# Patient Record
Sex: FEMALE | Race: WHITE | NOT HISPANIC OR LATINO | Employment: FULL TIME | URBAN - METROPOLITAN AREA
[De-identification: names, ages, dates, MRNs, and addresses within clinical notes are randomized per-mention and may not be internally consistent; named-entity substitution may affect disease eponyms.]

---

## 2019-09-03 ENCOUNTER — HOSPITAL ENCOUNTER (INPATIENT)
Facility: HOSPITAL | Age: 74
LOS: 3 days | Discharge: HOME | DRG: 280 | End: 2019-09-07
Attending: EMERGENCY MEDICINE | Admitting: HOSPITALIST
Payer: COMMERCIAL

## 2019-09-03 DIAGNOSIS — R93.5 ABNORMAL CT OF THE ABDOMEN: ICD-10-CM

## 2019-09-03 DIAGNOSIS — I71.9 PENETRATING ATHEROSCLEROTIC ULCER OF AORTA: ICD-10-CM

## 2019-09-03 DIAGNOSIS — I21.3 ACUTE ST ELEVATION MYOCARDIAL INFARCTION (STEMI): Primary | ICD-10-CM

## 2019-09-03 DIAGNOSIS — I21.4 NON-ST ELEVATION MYOCARDIAL INFARCTION (NSTEMI): ICD-10-CM

## 2019-09-03 DIAGNOSIS — Z72.0 TOBACCO ABUSE: ICD-10-CM

## 2019-09-03 DIAGNOSIS — I21.3 STEMI (ST ELEVATION MYOCARDIAL INFARCTION): ICD-10-CM

## 2019-09-03 DIAGNOSIS — I21.3 ACUTE ST ELEVATION MYOCARDIAL INFARCTION (STEMI), UNSPECIFIED ARTERY: ICD-10-CM

## 2019-09-03 DIAGNOSIS — I21.4 NSTEMI (NON-ST ELEVATION MYOCARDIAL INFARCTION): ICD-10-CM

## 2019-09-03 DIAGNOSIS — R79.89 ELEVATED TROPONIN: ICD-10-CM

## 2019-09-03 DIAGNOSIS — R07.9 CHEST PAIN, UNSPECIFIED TYPE: ICD-10-CM

## 2019-09-03 LAB
ALBUMIN SERPL BCP-MCNC: 3.5 G/DL (ref 3.5–5.2)
ALP SERPL-CCNC: 96 U/L (ref 55–135)
ALT SERPL W/O P-5'-P-CCNC: 27 U/L (ref 10–44)
ANION GAP SERPL CALC-SCNC: 11 MMOL/L (ref 8–16)
APTT BLDCRRT: 24.3 SEC (ref 21–32)
APTT BLDCRRT: 28.7 SEC (ref 21–32)
AST SERPL-CCNC: 51 U/L (ref 10–40)
BASOPHILS # BLD AUTO: 0.03 K/UL (ref 0–0.2)
BASOPHILS NFR BLD: 0.3 % (ref 0–1.9)
BILIRUB SERPL-MCNC: 0.5 MG/DL (ref 0.1–1)
BUN SERPL-MCNC: 18 MG/DL (ref 8–23)
CALCIUM SERPL-MCNC: 9.7 MG/DL (ref 8.7–10.5)
CHLORIDE SERPL-SCNC: 104 MMOL/L (ref 95–110)
CK SERPL-CCNC: 245 U/L (ref 20–180)
CO2 SERPL-SCNC: 23 MMOL/L (ref 23–29)
CREAT SERPL-MCNC: 1 MG/DL (ref 0.5–1.4)
DIFFERENTIAL METHOD: NORMAL
EOSINOPHIL # BLD AUTO: 0.2 K/UL (ref 0–0.5)
EOSINOPHIL NFR BLD: 2.1 % (ref 0–8)
ERYTHROCYTE [DISTWIDTH] IN BLOOD BY AUTOMATED COUNT: 14.4 % (ref 11.5–14.5)
EST. GFR  (AFRICAN AMERICAN): >60 ML/MIN/1.73 M^2
EST. GFR  (NON AFRICAN AMERICAN): 56 ML/MIN/1.73 M^2
GLUCOSE SERPL-MCNC: 101 MG/DL (ref 70–110)
HCT VFR BLD AUTO: 40.5 % (ref 37–48.5)
HGB BLD-MCNC: 13.3 G/DL (ref 12–16)
INR PPP: 0.9 (ref 0.8–1.2)
INR PPP: 1 (ref 0.8–1.2)
LYMPHOCYTES # BLD AUTO: 3.6 K/UL (ref 1–4.8)
LYMPHOCYTES NFR BLD: 34.9 % (ref 18–48)
MCH RBC QN AUTO: 28.8 PG (ref 27–31)
MCHC RBC AUTO-ENTMCNC: 32.8 G/DL (ref 32–36)
MCV RBC AUTO: 88 FL (ref 82–98)
MONOCYTES # BLD AUTO: 0.9 K/UL (ref 0.3–1)
MONOCYTES NFR BLD: 8.5 % (ref 4–15)
NEUTROPHILS # BLD AUTO: 5.6 K/UL (ref 1.8–7.7)
NEUTROPHILS NFR BLD: 54.2 % (ref 38–73)
PLATELET # BLD AUTO: 271 K/UL (ref 150–350)
PMV BLD AUTO: 10.4 FL (ref 9.2–12.9)
POTASSIUM SERPL-SCNC: 3.6 MMOL/L (ref 3.5–5.1)
PROT SERPL-MCNC: 7.5 G/DL (ref 6–8.4)
PROTHROMBIN TIME: 10 SEC (ref 9–12.5)
PROTHROMBIN TIME: 10.2 SEC (ref 9–12.5)
RBC # BLD AUTO: 4.62 M/UL (ref 4–5.4)
SODIUM SERPL-SCNC: 138 MMOL/L (ref 136–145)
TROPONIN I SERPL DL<=0.01 NG/ML-MCNC: 27.51 NG/ML (ref 0–0.03)
TROPONIN I SERPL DL<=0.01 NG/ML-MCNC: 28.1 NG/ML (ref 0–0.03)
TSH SERPL DL<=0.005 MIU/L-ACNC: 1.43 UIU/ML (ref 0.4–4)
WBC # BLD AUTO: 10.31 K/UL (ref 3.9–12.7)

## 2019-09-03 PROCEDURE — 80053 COMPREHEN METABOLIC PANEL: CPT

## 2019-09-03 PROCEDURE — 99252 PR INITIAL INPATIENT CONSULT,LEVL II: ICD-10-PCS | Mod: ,,, | Performed by: INTERNAL MEDICINE

## 2019-09-03 PROCEDURE — 85610 PROTHROMBIN TIME: CPT | Mod: 91

## 2019-09-03 PROCEDURE — 82550 ASSAY OF CK (CPK): CPT

## 2019-09-03 PROCEDURE — 86038 ANTINUCLEAR ANTIBODIES: CPT

## 2019-09-03 PROCEDURE — 85730 THROMBOPLASTIN TIME PARTIAL: CPT | Mod: 91

## 2019-09-03 PROCEDURE — 84484 ASSAY OF TROPONIN QUANT: CPT | Mod: 91

## 2019-09-03 PROCEDURE — 85025 COMPLETE CBC W/AUTO DIFF WBC: CPT

## 2019-09-03 PROCEDURE — 25000003 PHARM REV CODE 250: Performed by: INTERNAL MEDICINE

## 2019-09-03 PROCEDURE — 85610 PROTHROMBIN TIME: CPT

## 2019-09-03 PROCEDURE — 99291 CRITICAL CARE FIRST HOUR: CPT | Mod: 25

## 2019-09-03 PROCEDURE — 25500020 PHARM REV CODE 255: Performed by: EMERGENCY MEDICINE

## 2019-09-03 PROCEDURE — 84443 ASSAY THYROID STIM HORMONE: CPT

## 2019-09-03 PROCEDURE — 80307 DRUG TEST PRSMV CHEM ANLYZR: CPT

## 2019-09-03 PROCEDURE — 96375 TX/PRO/DX INJ NEW DRUG ADDON: CPT

## 2019-09-03 PROCEDURE — 96374 THER/PROPH/DIAG INJ IV PUSH: CPT

## 2019-09-03 PROCEDURE — 85730 THROMBOPLASTIN TIME PARTIAL: CPT

## 2019-09-03 PROCEDURE — 99252 IP/OBS CONSLTJ NEW/EST SF 35: CPT | Mod: ,,, | Performed by: INTERNAL MEDICINE

## 2019-09-03 RX ORDER — HEPARIN SODIUM,PORCINE/D5W 25000/250
12 INTRAVENOUS SOLUTION INTRAVENOUS CONTINUOUS
Status: DISCONTINUED | OUTPATIENT
Start: 2019-09-04 | End: 2019-09-06

## 2019-09-03 RX ORDER — HEPARIN SODIUM,PORCINE/D5W 25000/250
12 INTRAVENOUS SOLUTION INTRAVENOUS CONTINUOUS
Status: DISCONTINUED | OUTPATIENT
Start: 2019-09-03 | End: 2019-09-03

## 2019-09-03 RX ADMIN — HEPARIN SODIUM AND DEXTROSE 12 UNITS/KG/HR: 10000; 5 INJECTION INTRAVENOUS at 11:09

## 2019-09-03 RX ADMIN — TICAGRELOR 180 MG: 90 TABLET ORAL at 10:09

## 2019-09-03 RX ADMIN — IOHEXOL 75 ML: 350 INJECTION, SOLUTION INTRAVENOUS at 06:09

## 2019-09-03 NOTE — ED PROVIDER NOTES
Encounter Date: 9/3/2019       History   No chief complaint on file.    74 y.o. female Past Medical History:  No date: Hypertension   Smoker,  Notes that she was in [facility] until 2 days ago notes that she ate something and shortly after had burning sensation in chest rad to back and across arm, vomited once and then took an ibuprofen and notes that all symptoms resolved within minutes, today went to see primary care as she was concerned that it could be her gallbladder.  While there she  Had an ekg which was concerning for STEMI.  notes that 2-3 times now she has had this post prandial discomfort. Notes that currently she does not have the burning sensation in her chest. States that she has a non pleuritic pinching sensation in L chest. Got 325mg asa and 2 SL nitro prior to arrival which have not affected her symptoms.        Review of patient's allergies indicates:   Allergen Reactions    Codeine     Demerol [meperidine]     Epinephrine      Past Medical History:   Diagnosis Date    Hypertension      Past Surgical History:   Procedure Laterality Date    HYSTERECTOMY      TONSILLECTOMY       No family history on file.  Social History     Tobacco Use    Smoking status: Current Every Day Smoker   Substance Use Topics    Alcohol use: No    Drug use: No     Review of Systems   Constitutional: Negative for fever.   HENT: Negative for sore throat.    Respiratory: Negative for shortness of breath.    Cardiovascular: Negative for chest pain.   Gastrointestinal: Negative for nausea.   Genitourinary: Negative for dysuria.   Musculoskeletal: Negative for back pain.   Skin: Negative for rash.   Neurological: Negative for weakness.   Hematological: Does not bruise/bleed easily.   All other systems reviewed and are negative.      Physical Exam     Initial Vitals   BP Pulse Resp Temp SpO2   -- -- -- -- --      MAP       --         Physical Exam    Nursing note and vitals reviewed.  Constitutional: She appears well-developed  "and well-nourished.   HENT:   Head: Normocephalic and atraumatic.   Eyes: Conjunctivae and EOM are normal. Pupils are equal, round, and reactive to light.   Neck: Normal range of motion.   Cardiovascular: Normal rate and regular rhythm.   Pulmonary/Chest: Breath sounds normal. No respiratory distress. She has no wheezes. She has no rales.   Abdominal: She exhibits no distension.   Musculoskeletal: Normal range of motion.   Neurological: She is alert. No cranial nerve deficit. GCS score is 15. GCS eye subscore is 4. GCS verbal subscore is 5. GCS motor subscore is 6.   Skin: Skin is warm and dry.   Psychiatric: She has a normal mood and affect. Thought content normal.     sitting up smiling, no distress, comfortable, chatting with providers, laughing dismissively at providers when we raise the possibility that she may be having an MI "i'm not having a heart attack", she is very excited about the "story she will have to tell".    ED Course   Procedures  Labs Reviewed - No data to display  EKG Readings: (Independently Interpreted)   I have reviewed all EKGs from pre-hospital and here from today.  15:46 from primary care: hr 88, nl axis/intervals, profound ARTI v3-6  16:37 from EMS hr 94, nl axis/intervals, ARTI v2-6  17:01 hr 101 nl axis/intervals, arti I, II, III, AVF, v2-6  17:09 hr 93, nl axis/intevals, ARTI II, III, AVF, v2-3 with std v6       Imaging Results    None          Medical Decision Making:   Initial Assessment:   This is an atypical presentation. Pt keeps saying she "never had pain" and "does not have pain currently" and "is not having a heart attack".     17:25 I have discussed with Dr. Jenkins from interventional cardiology. He will evaluate the patient.    17:40 Dr. Jenkins has evaluated the patient and reviewed the EKGs'. Unclear etiology of her dynamic changes ? Coronary spasm less likely dissection, she does have a recent viral illness ?pericarditis.  He does not believe that this constitutes a stemi but has " asked me to call him back if her trop are elevated.    18:15 I have called Dr. Jenkins to notify him that trop is elevated. He is in cath lab procedure with another pt and asks that I call Dr. Mederos  18:20 Dr. Mederos asks me to call Dr. Camejo who is on stemi call  18:22 I have spoken with Dr. Camejo who feels that the patient likely had a STEMI several days ago when she initially had pain and is not currently having a stemi and does not need emergent cath now. This is supported by the fact that the patient is absolutely pain free, smiling, well appearing.    19:14 I have spoken with Dr. Jenkins who agrees with discussion with vascular surgery prior to anticoagulating. He asks that we obtain consultation from Vascular surgery prior to further intervention in an effort to prevent harm.       19:25 I have d/w Dr. Helton from Vascular surgery who has reviewed her CT scan and will provide recs.      19:41 Dr. Helton feels that the patient likely needs cardiology intervention for cardiac event which likely happened days ago, however given that we do not have vascular surgery here that is able to consult on patient and approve anticoagulation he agrees with transfer to [facility] for coordinated treatment between vascular surgery and interventional cardiology.          Labs Reviewed   COMPREHENSIVE METABOLIC PANEL - Abnormal; Notable for the following components:       Result Value    AST 51 (*)     eGFR if non  56 (*)     All other components within normal limits   TROPONIN I - Abnormal; Notable for the following components:    Troponin I 28.103 (*)     All other components within normal limits   CK - Abnormal; Notable for the following components:     (*)     All other components within normal limits   CBC W/ AUTO DIFFERENTIAL   TSH   PROTIME-INR   APTT   DRUG SCREEN PANEL, URINE EMERGENCY   LULY PROFILE I (SCREEN)       CTA Chest Abdomen Pelvis   Final Result      Penetrating  atherosclerotic ulcer involving the suprarenal abdominal aorta.  Findings may account for acute chest pain.  Moderate atherosclerotic disease.      Mild left basilar atelectasis versus early infiltrate.      Fat containing right pelvic mass.  Findings are concerning for teratoma.         Electronically signed by: Carlyn Kennedy   Date:    09/03/2019   Time:    18:50                  Attending Attestation:         Attending Critical Care:   Critical Care Times:   Direct Patient Care (initial evaluation, reassessments, and time considering the case)................................................................60 minutes.   Additional History from reviewing old medical records or taking additional history from the family, EMS, PCP, etc.......................10 minutes.   Ordering, Reviewing, and Interpreting Diagnostic Studies...............................................................................................................10 minutes.   Documentation..................................................................................................................................................................................10 minutes.   Consultation with other Physicians. .................................................................................................................................................10 minutes.   ==============================================================  · Total Critical Care Time - exclusive of procedural time: 100 minutes.  ==============================================================                    Clinical Impression:       ICD-10-CM ICD-9-CM   1. Abnormal CT of the abdomen R93.5 793.6   2. Chest pain, unspecified type R07.9 786.50   3. Elevated troponin R74.8 790.6   4.      Penetrating Aortic Ulcer with Intramural Hematoma                             Angel Burton MD  09/03/19 2000

## 2019-09-03 NOTE — ED TRIAGE NOTES
Pt with c/o mid sternal chest pain radiating towards back associated with nausea and vomiting.Pt took antiinflammatory and pain went away. Pt reports pain on and off since Saturday. Pt seen at PCP and EKG performed.   EMS called for STEMI activation. Asprin 325mg and 2 sublingual nitros given in rout.

## 2019-09-04 PROBLEM — R93.5 ABNORMAL CT OF THE ABDOMEN: Status: ACTIVE | Noted: 2019-09-04

## 2019-09-04 PROBLEM — I21.3 ACUTE ST ELEVATION MYOCARDIAL INFARCTION (STEMI): Status: ACTIVE | Noted: 2019-09-04

## 2019-09-04 PROBLEM — Z72.0 TOBACCO ABUSE: Status: ACTIVE | Noted: 2019-09-04

## 2019-09-04 PROBLEM — I10 ESSENTIAL HYPERTENSION: Status: ACTIVE | Noted: 2019-09-04

## 2019-09-04 LAB
ABO + RH BLD: NORMAL
AMPHET+METHAMPHET UR QL: NEGATIVE
ANION GAP SERPL CALC-SCNC: 11 MMOL/L (ref 8–16)
AORTIC ROOT ANNULUS: 3.16 CM
AORTIC VALVE CUSP SEPERATION: 1.87 CM
APTT BLDCRRT: 28.4 SEC (ref 21–32)
APTT BLDCRRT: 30.3 SEC (ref 21–32)
APTT BLDCRRT: 31.5 SEC (ref 21–32)
APTT BLDCRRT: 36.5 SEC (ref 21–32)
ASCENDING AORTA: 2.91 CM
ASCENDING AORTA: 3.28 CM
AV INDEX (PROSTH): 0.68
AV INDEX (PROSTH): 0.85
AV MEAN GRADIENT: 4 MMHG
AV MEAN GRADIENT: 4 MMHG
AV PEAK GRADIENT: 7 MMHG
AV PEAK GRADIENT: 8 MMHG
AV VALVE AREA: 2.06 CM2
AV VALVE AREA: 2.98 CM2
AV VELOCITY RATIO: 0.75
AV VELOCITY RATIO: 0.77
BARBITURATES UR QL SCN>200 NG/ML: NEGATIVE
BASOPHILS # BLD AUTO: 0.06 K/UL (ref 0–0.2)
BASOPHILS NFR BLD: 0.7 % (ref 0–1.9)
BENZODIAZ UR QL SCN>200 NG/ML: NEGATIVE
BLD GP AB SCN CELLS X3 SERPL QL: NORMAL
BSA FOR ECHO PROCEDURE: 1.81 M2
BSA FOR ECHO PROCEDURE: 1.84 M2
BUN SERPL-MCNC: 12 MG/DL (ref 8–23)
BZE UR QL SCN: NEGATIVE
CALCIUM SERPL-MCNC: 9 MG/DL (ref 8.7–10.5)
CANNABINOIDS UR QL SCN: NEGATIVE
CHLORIDE SERPL-SCNC: 105 MMOL/L (ref 95–110)
CHOLEST SERPL-MCNC: 168 MG/DL (ref 120–199)
CHOLEST/HDLC SERPL: 3.7 {RATIO} (ref 2–5)
CO2 SERPL-SCNC: 21 MMOL/L (ref 23–29)
CREAT SERPL-MCNC: 0.8 MG/DL (ref 0.5–1.4)
CREAT UR-MCNC: 137 MG/DL (ref 15–325)
CV ECHO LV RWT: 0.42 CM
CV ECHO LV RWT: 0.43 CM
DIFFERENTIAL METHOD: ABNORMAL
DOP CALC AO PEAK VEL: 1.35 M/S
DOP CALC AO PEAK VEL: 1.39 M/S
DOP CALC AO VTI: 21.12 CM
DOP CALC AO VTI: 25.54 CM
DOP CALC LVOT AREA: 3 CM2
DOP CALC LVOT AREA: 3.5 CM2
DOP CALC LVOT DIAMETER: 1.97 CM
DOP CALC LVOT DIAMETER: 2.11 CM
DOP CALC LVOT PEAK VEL: 1.04 M/S
DOP CALC LVOT PEAK VEL: 1.04 M/S
DOP CALC LVOT STROKE VOLUME: 52.7 CM3
DOP CALC LVOT STROKE VOLUME: 62.84 CM3
DOP CALCLVOT PEAK VEL VTI: 17.3 CM
DOP CALCLVOT PEAK VEL VTI: 17.98 CM
E WAVE DECELERATION TIME: 128.32 MSEC
E WAVE DECELERATION TIME: 223.34 MSEC
E/A RATIO: 0.71
E/A RATIO: 0.72
E/E' RATIO: 13.4 M/S
E/E' RATIO: 16.67 M/S
ECHO LV POSTERIOR WALL: 1.12 CM (ref 0.6–1.1)
ECHO LV POSTERIOR WALL: 1.12 CM (ref 0.6–1.1)
EOSINOPHIL # BLD AUTO: 0.1 K/UL (ref 0–0.5)
EOSINOPHIL NFR BLD: 1.5 % (ref 0–8)
ERYTHROCYTE [DISTWIDTH] IN BLOOD BY AUTOMATED COUNT: 13.6 % (ref 11.5–14.5)
EST. GFR  (AFRICAN AMERICAN): >60 ML/MIN/1.73 M^2
EST. GFR  (NON AFRICAN AMERICAN): >60 ML/MIN/1.73 M^2
ESTIMATED AVG GLUCOSE: 105 MG/DL (ref 68–131)
FRACTIONAL SHORTENING: 23 % (ref 28–44)
FRACTIONAL SHORTENING: 29 % (ref 28–44)
GLUCOSE SERPL-MCNC: 110 MG/DL (ref 70–110)
HBA1C MFR BLD HPLC: 5.3 % (ref 4–5.6)
HCT VFR BLD AUTO: 39.3 % (ref 37–48.5)
HDLC SERPL-MCNC: 46 MG/DL (ref 40–75)
HDLC SERPL: 27.4 % (ref 20–50)
HGB BLD-MCNC: 13.4 G/DL (ref 12–16)
IMM GRANULOCYTES # BLD AUTO: 0.04 K/UL (ref 0–0.04)
IMM GRANULOCYTES NFR BLD AUTO: 0.4 % (ref 0–0.5)
INTERVENTRICULAR SEPTUM: 1.08 CM (ref 0.6–1.1)
INTERVENTRICULAR SEPTUM: 1.1 CM (ref 0.6–1.1)
IVRT: 0.09 MSEC
LA MAJOR: 4.45 CM
LA MAJOR: 4.85 CM
LA MINOR: 5.04 CM
LA MINOR: 5.19 CM
LA WIDTH: 4.4 CM
LA WIDTH: 5.06 CM
LDLC SERPL CALC-MCNC: 98.8 MG/DL (ref 63–159)
LEFT ATRIUM SIZE: 4.34 CM
LEFT ATRIUM SIZE: 4.41 CM
LEFT ATRIUM VOLUME INDEX: 45.2 ML/M2
LEFT ATRIUM VOLUME INDEX: 49.3 ML/M2
LEFT ATRIUM VOLUME: 82.7 CM3
LEFT ATRIUM VOLUME: 88.23 CM3
LEFT INTERNAL DIMENSION IN SYSTOLE: 3.7 CM (ref 2.1–4)
LEFT INTERNAL DIMENSION IN SYSTOLE: 4.12 CM (ref 2.1–4)
LEFT VENTRICLE DIASTOLIC VOLUME INDEX: 72.7 ML/M2
LEFT VENTRICLE DIASTOLIC VOLUME INDEX: 75.97 ML/M2
LEFT VENTRICLE DIASTOLIC VOLUME: 130.1 ML
LEFT VENTRICLE DIASTOLIC VOLUME: 138.98 ML
LEFT VENTRICLE MASS INDEX: 124 G/M2
LEFT VENTRICLE MASS INDEX: 128 G/M2
LEFT VENTRICLE SYSTOLIC VOLUME INDEX: 32.4 ML/M2
LEFT VENTRICLE SYSTOLIC VOLUME INDEX: 41 ML/M2
LEFT VENTRICLE SYSTOLIC VOLUME: 57.95 ML
LEFT VENTRICLE SYSTOLIC VOLUME: 75.03 ML
LEFT VENTRICULAR INTERNAL DIMENSION IN DIASTOLE: 5.21 CM (ref 3.5–6)
LEFT VENTRICULAR INTERNAL DIMENSION IN DIASTOLE: 5.36 CM (ref 3.5–6)
LEFT VENTRICULAR MASS: 221.45 G
LEFT VENTRICULAR MASS: 234.83 G
LV LATERAL E/E' RATIO: 11.17 M/S
LV LATERAL E/E' RATIO: 15 M/S
LV SEPTAL E/E' RATIO: 16.75 M/S
LV SEPTAL E/E' RATIO: 18.75 M/S
LYMPHOCYTES # BLD AUTO: 1.5 K/UL (ref 1–4.8)
LYMPHOCYTES NFR BLD: 16.6 % (ref 18–48)
MAGNESIUM SERPL-MCNC: 2 MG/DL (ref 1.6–2.6)
MCH RBC QN AUTO: 29.5 PG (ref 27–31)
MCHC RBC AUTO-ENTMCNC: 34.1 G/DL (ref 32–36)
MCV RBC AUTO: 87 FL (ref 82–98)
METHADONE UR QL SCN>300 NG/ML: NEGATIVE
MONOCYTES # BLD AUTO: 0.7 K/UL (ref 0.3–1)
MONOCYTES NFR BLD: 7.5 % (ref 4–15)
MV PEAK A VEL: 0.95 M/S
MV PEAK A VEL: 1.04 M/S
MV PEAK E VEL: 0.67 M/S
MV PEAK E VEL: 0.75 M/S
NEUTROPHILS # BLD AUTO: 6.6 K/UL (ref 1.8–7.7)
NEUTROPHILS NFR BLD: 73.3 % (ref 38–73)
NONHDLC SERPL-MCNC: 122 MG/DL
NRBC BLD-RTO: 0 /100 WBC
OPIATES UR QL SCN: NEGATIVE
PCP UR QL SCN>25 NG/ML: NEGATIVE
PHOSPHATE SERPL-MCNC: 2.5 MG/DL (ref 2.7–4.5)
PISA TR MAX VEL: 2.76 M/S
PISA TR MAX VEL: 3.07 M/S
PLATELET # BLD AUTO: 253 K/UL (ref 150–350)
PMV BLD AUTO: 10.5 FL (ref 9.2–12.9)
POTASSIUM SERPL-SCNC: 3.3 MMOL/L (ref 3.5–5.1)
PULM VEIN S/D RATIO: 1.68
PV PEAK D VEL: 0.56 M/S
PV PEAK S VEL: 0.94 M/S
PV PEAK VELOCITY: 1.09 CM/S
RA MAJOR: 3.86 CM
RA MAJOR: 4.31 CM
RA PRESSURE: 3 MMHG
RA PRESSURE: 3 MMHG
RA WIDTH: 2.37 CM
RA WIDTH: 3.46 CM
RBC # BLD AUTO: 4.54 M/UL (ref 4–5.4)
RIGHT VENTRICULAR END-DIASTOLIC DIMENSION: 2.72 CM
RIGHT VENTRICULAR END-DIASTOLIC DIMENSION: 3.17 CM
RV TISSUE DOPPLER FREE WALL SYSTOLIC VELOCITY 1 (APICAL 4 CHAMBER VIEW): 7.38 CM/S
SINUS: 2.74 CM
SINUS: 3.13 CM
SODIUM SERPL-SCNC: 137 MMOL/L (ref 136–145)
STJ: 2.38 CM
STJ: 2.76 CM
TDI LATERAL: 0.05 M/S
TDI LATERAL: 0.06 M/S
TDI SEPTAL: 0.04 M/S
TDI SEPTAL: 0.04 M/S
TDI: 0.05 M/S
TDI: 0.05 M/S
TOXICOLOGY INFORMATION: NORMAL
TR MAX PG: 30 MMHG
TR MAX PG: 38 MMHG
TRICUSPID ANNULAR PLANE SYSTOLIC EXCURSION: 1.56 CM
TRICUSPID ANNULAR PLANE SYSTOLIC EXCURSION: 1.56 CM
TRIGL SERPL-MCNC: 116 MG/DL (ref 30–150)
TROPONIN I SERPL DL<=0.01 NG/ML-MCNC: 16.2 NG/ML (ref 0–0.03)
TROPONIN I SERPL DL<=0.01 NG/ML-MCNC: 22.39 NG/ML (ref 0–0.03)
TROPONIN I SERPL DL<=0.01 NG/ML-MCNC: 23.45 NG/ML (ref 0–0.03)
TROPONIN I SERPL DL<=0.01 NG/ML-MCNC: 24.5 NG/ML (ref 0–0.03)
TROPONIN I SERPL DL<=0.01 NG/ML-MCNC: 24.5 NG/ML (ref 0–0.03)
TV REST PULMONARY ARTERY PRESSURE: 33 MMHG
TV REST PULMONARY ARTERY PRESSURE: 41 MMHG
WBC # BLD AUTO: 8.94 K/UL (ref 3.9–12.7)

## 2019-09-04 PROCEDURE — 99223 1ST HOSP IP/OBS HIGH 75: CPT | Mod: ,,, | Performed by: HOSPITALIST

## 2019-09-04 PROCEDURE — 25000003 PHARM REV CODE 250: Performed by: HOSPITALIST

## 2019-09-04 PROCEDURE — 99255 IP/OBS CONSLTJ NEW/EST HI 80: CPT | Mod: ,,, | Performed by: STUDENT IN AN ORGANIZED HEALTH CARE EDUCATION/TRAINING PROGRAM

## 2019-09-04 PROCEDURE — 99255 PR INITIAL INPATIENT CONSULT,LEVL V: ICD-10-PCS | Mod: ,,, | Performed by: STUDENT IN AN ORGANIZED HEALTH CARE EDUCATION/TRAINING PROGRAM

## 2019-09-04 PROCEDURE — 20600001 HC STEP DOWN PRIVATE ROOM

## 2019-09-04 PROCEDURE — 25000003 PHARM REV CODE 250: Performed by: NURSE PRACTITIONER

## 2019-09-04 PROCEDURE — 84100 ASSAY OF PHOSPHORUS: CPT

## 2019-09-04 PROCEDURE — 84484 ASSAY OF TROPONIN QUANT: CPT | Mod: 91

## 2019-09-04 PROCEDURE — 86901 BLOOD TYPING SEROLOGIC RH(D): CPT

## 2019-09-04 PROCEDURE — 85730 THROMBOPLASTIN TIME PARTIAL: CPT | Mod: 91

## 2019-09-04 PROCEDURE — 63600175 PHARM REV CODE 636 W HCPCS: Performed by: INTERNAL MEDICINE

## 2019-09-04 PROCEDURE — 36415 COLL VENOUS BLD VENIPUNCTURE: CPT

## 2019-09-04 PROCEDURE — 25000003 PHARM REV CODE 250: Performed by: INTERNAL MEDICINE

## 2019-09-04 PROCEDURE — 85730 THROMBOPLASTIN TIME PARTIAL: CPT

## 2019-09-04 PROCEDURE — 80061 LIPID PANEL: CPT

## 2019-09-04 PROCEDURE — 80048 BASIC METABOLIC PNL TOTAL CA: CPT

## 2019-09-04 PROCEDURE — 99223 PR INITIAL HOSPITAL CARE,LEVL III: ICD-10-PCS | Mod: ,,, | Performed by: HOSPITALIST

## 2019-09-04 PROCEDURE — 85025 COMPLETE CBC W/AUTO DIFF WBC: CPT

## 2019-09-04 PROCEDURE — 83735 ASSAY OF MAGNESIUM: CPT

## 2019-09-04 PROCEDURE — 63600175 PHARM REV CODE 636 W HCPCS: Performed by: HOSPITALIST

## 2019-09-04 PROCEDURE — 83036 HEMOGLOBIN GLYCOSYLATED A1C: CPT

## 2019-09-04 PROCEDURE — 25000003 PHARM REV CODE 250: Performed by: PHYSICIAN ASSISTANT

## 2019-09-04 RX ORDER — LISINOPRIL 20 MG/1
20 TABLET ORAL DAILY
Status: DISCONTINUED | OUTPATIENT
Start: 2019-09-04 | End: 2019-09-04

## 2019-09-04 RX ORDER — POTASSIUM CHLORIDE 750 MG/1
30 CAPSULE, EXTENDED RELEASE ORAL
Status: DISPENSED | OUTPATIENT
Start: 2019-09-04 | End: 2019-09-04

## 2019-09-04 RX ORDER — IBUPROFEN 200 MG
16 TABLET ORAL
Status: DISCONTINUED | OUTPATIENT
Start: 2019-09-04 | End: 2019-09-04

## 2019-09-04 RX ORDER — SODIUM CHLORIDE 0.9 % (FLUSH) 0.9 %
10 SYRINGE (ML) INJECTION
Status: DISCONTINUED | OUTPATIENT
Start: 2019-09-04 | End: 2019-09-07 | Stop reason: HOSPADM

## 2019-09-04 RX ORDER — POLYETHYLENE GLYCOL 3350 17 G/17G
17 POWDER, FOR SOLUTION ORAL 2 TIMES DAILY PRN
Status: DISCONTINUED | OUTPATIENT
Start: 2019-09-04 | End: 2019-09-07 | Stop reason: HOSPADM

## 2019-09-04 RX ORDER — NAPROXEN SODIUM 220 MG/1
81 TABLET, FILM COATED ORAL DAILY
Status: DISCONTINUED | OUTPATIENT
Start: 2019-09-05 | End: 2019-09-07 | Stop reason: HOSPADM

## 2019-09-04 RX ORDER — BISACODYL 10 MG
10 SUPPOSITORY, RECTAL RECTAL DAILY PRN
Status: DISCONTINUED | OUTPATIENT
Start: 2019-09-04 | End: 2019-09-07 | Stop reason: HOSPADM

## 2019-09-04 RX ORDER — DEXTROSE MONOHYDRATE 100 MG/ML
12.5 INJECTION, SOLUTION INTRAVENOUS
Status: DISCONTINUED | OUTPATIENT
Start: 2019-09-04 | End: 2019-09-04

## 2019-09-04 RX ORDER — ATORVASTATIN CALCIUM 20 MG/1
80 TABLET, FILM COATED ORAL
Status: COMPLETED | OUTPATIENT
Start: 2019-09-04 | End: 2019-09-04

## 2019-09-04 RX ORDER — LORAZEPAM 0.5 MG/1
0.5 TABLET ORAL ONCE
Status: COMPLETED | OUTPATIENT
Start: 2019-09-04 | End: 2019-09-04

## 2019-09-04 RX ORDER — TRAMADOL HYDROCHLORIDE 50 MG/1
50 TABLET ORAL EVERY 6 HOURS PRN
Status: DISCONTINUED | OUTPATIENT
Start: 2019-09-04 | End: 2019-09-07 | Stop reason: HOSPADM

## 2019-09-04 RX ORDER — NITROGLYCERIN 0.4 MG/1
0.4 TABLET SUBLINGUAL EVERY 5 MIN PRN
Status: DISCONTINUED | OUTPATIENT
Start: 2019-09-04 | End: 2019-09-07 | Stop reason: HOSPADM

## 2019-09-04 RX ORDER — AMOXICILLIN 250 MG
1 CAPSULE ORAL 2 TIMES DAILY PRN
Status: DISCONTINUED | OUTPATIENT
Start: 2019-09-04 | End: 2019-09-04

## 2019-09-04 RX ORDER — GLUCAGON 1 MG
1 KIT INJECTION
Status: DISCONTINUED | OUTPATIENT
Start: 2019-09-04 | End: 2019-09-04

## 2019-09-04 RX ORDER — IPRATROPIUM BROMIDE AND ALBUTEROL SULFATE 2.5; .5 MG/3ML; MG/3ML
3 SOLUTION RESPIRATORY (INHALATION) EVERY 4 HOURS PRN
Status: DISCONTINUED | OUTPATIENT
Start: 2019-09-04 | End: 2019-09-04

## 2019-09-04 RX ORDER — SODIUM CHLORIDE 9 MG/ML
3 INJECTION, SOLUTION INTRAVENOUS CONTINUOUS
Status: ACTIVE | OUTPATIENT
Start: 2019-09-04 | End: 2019-09-04

## 2019-09-04 RX ORDER — ONDANSETRON 4 MG/1
4 TABLET, ORALLY DISINTEGRATING ORAL EVERY 8 HOURS PRN
Status: DISCONTINUED | OUTPATIENT
Start: 2019-09-04 | End: 2019-09-04

## 2019-09-04 RX ORDER — RAMELTEON 8 MG/1
8 TABLET ORAL NIGHTLY PRN
Status: DISCONTINUED | OUTPATIENT
Start: 2019-09-04 | End: 2019-09-04

## 2019-09-04 RX ORDER — ACETAMINOPHEN 325 MG/1
650 TABLET ORAL EVERY 4 HOURS PRN
Status: DISCONTINUED | OUTPATIENT
Start: 2019-09-04 | End: 2019-09-04

## 2019-09-04 RX ORDER — DEXTROSE MONOHYDRATE 100 MG/ML
25 INJECTION, SOLUTION INTRAVENOUS
Status: DISCONTINUED | OUTPATIENT
Start: 2019-09-04 | End: 2019-09-04

## 2019-09-04 RX ORDER — ACETAMINOPHEN 325 MG/1
650 TABLET ORAL EVERY 4 HOURS PRN
Status: DISCONTINUED | OUTPATIENT
Start: 2019-09-04 | End: 2019-09-07 | Stop reason: HOSPADM

## 2019-09-04 RX ORDER — AMOXICILLIN 250 MG
1 CAPSULE ORAL 2 TIMES DAILY
Status: DISCONTINUED | OUTPATIENT
Start: 2019-09-04 | End: 2019-09-07 | Stop reason: HOSPADM

## 2019-09-04 RX ORDER — IBUPROFEN 200 MG
24 TABLET ORAL
Status: DISCONTINUED | OUTPATIENT
Start: 2019-09-04 | End: 2019-09-04

## 2019-09-04 RX ORDER — ATORVASTATIN CALCIUM 20 MG/1
80 TABLET, FILM COATED ORAL DAILY
Status: DISCONTINUED | OUTPATIENT
Start: 2019-09-04 | End: 2019-09-07 | Stop reason: HOSPADM

## 2019-09-04 RX ORDER — METOPROLOL TARTRATE 25 MG/1
25 TABLET, FILM COATED ORAL 2 TIMES DAILY
Status: DISCONTINUED | OUTPATIENT
Start: 2019-09-04 | End: 2019-09-07 | Stop reason: HOSPADM

## 2019-09-04 RX ORDER — RAMELTEON 8 MG/1
8 TABLET ORAL NIGHTLY PRN
Status: DISCONTINUED | OUTPATIENT
Start: 2019-09-04 | End: 2019-09-07 | Stop reason: HOSPADM

## 2019-09-04 RX ORDER — AMLODIPINE BESYLATE 10 MG/1
10 TABLET ORAL DAILY
Status: DISCONTINUED | OUTPATIENT
Start: 2019-09-04 | End: 2019-09-05

## 2019-09-04 RX ORDER — METOPROLOL TARTRATE 25 MG/1
25 TABLET, FILM COATED ORAL
Status: COMPLETED | OUTPATIENT
Start: 2019-09-04 | End: 2019-09-04

## 2019-09-04 RX ORDER — ONDANSETRON 8 MG/1
8 TABLET, ORALLY DISINTEGRATING ORAL EVERY 8 HOURS PRN
Status: DISCONTINUED | OUTPATIENT
Start: 2019-09-04 | End: 2019-09-07 | Stop reason: HOSPADM

## 2019-09-04 RX ORDER — ONDANSETRON 2 MG/ML
4 INJECTION INTRAMUSCULAR; INTRAVENOUS EVERY 8 HOURS PRN
Status: DISCONTINUED | OUTPATIENT
Start: 2019-09-04 | End: 2019-09-07 | Stop reason: HOSPADM

## 2019-09-04 RX ORDER — SODIUM CHLORIDE 0.9 % (FLUSH) 0.9 %
5 SYRINGE (ML) INJECTION
Status: DISCONTINUED | OUTPATIENT
Start: 2019-09-04 | End: 2019-09-04

## 2019-09-04 RX ADMIN — METOPROLOL TARTRATE 25 MG: 25 TABLET ORAL at 08:09

## 2019-09-04 RX ADMIN — LORAZEPAM 0.5 MG: 0.5 TABLET ORAL at 03:09

## 2019-09-04 RX ADMIN — HUMAN ALBUMIN MICROSPHERES AND PERFLUTREN 0.66 MG: 10; .22 INJECTION, SOLUTION INTRAVENOUS at 03:09

## 2019-09-04 RX ADMIN — METOPROLOL TARTRATE 25 MG: 25 TABLET ORAL at 09:09

## 2019-09-04 RX ADMIN — HEPARIN SODIUM AND DEXTROSE 17 UNITS/KG/HR: 10000; 5 INJECTION INTRAVENOUS at 03:09

## 2019-09-04 RX ADMIN — RAMELTEON 8 MG: 8 TABLET ORAL at 09:09

## 2019-09-04 RX ADMIN — POTASSIUM CHLORIDE 30 MEQ: 750 CAPSULE, EXTENDED RELEASE ORAL at 05:09

## 2019-09-04 RX ADMIN — ATORVASTATIN CALCIUM 80 MG: 20 TABLET, FILM COATED ORAL at 03:09

## 2019-09-04 RX ADMIN — HEPARIN SODIUM AND DEXTROSE 20 UNITS/KG/HR: 10000; 5 INJECTION INTRAVENOUS at 11:09

## 2019-09-04 RX ADMIN — SENNOSIDES,DOCUSATE SODIUM 1 TABLET: 8.6; 5 TABLET, FILM COATED ORAL at 09:09

## 2019-09-04 RX ADMIN — ATORVASTATIN CALCIUM 80 MG: 20 TABLET, FILM COATED ORAL at 08:09

## 2019-09-04 RX ADMIN — METOPROLOL TARTRATE 25 MG: 25 TABLET ORAL at 04:09

## 2019-09-04 RX ADMIN — AMLODIPINE BESYLATE 10 MG: 10 TABLET ORAL at 08:09

## 2019-09-04 RX ADMIN — TICAGRELOR 90 MG: 90 TABLET ORAL at 09:09

## 2019-09-04 RX ADMIN — TICAGRELOR 90 MG: 90 TABLET ORAL at 01:09

## 2019-09-04 NOTE — ED TRIAGE NOTES
"Ella Baron, an 74 y.o. female presents to the ED as a transfer from [facility] with a possible neurovascular emergency.  Patient was reporting chest pain that radiated through to her back since Saturday.  She took an anti-inflammatory drug and the pain subsided.  She was given 325mg ASA and 2 SL nitros by EMS.  Her ED visit was a pericarditis vs. STEMI.  Her CT abdomen/pelvis revealed a tear in her aorta.  First Troponin was 28.103.  Patient is a daily smoker, denies pain, and has a history of hypertension.        Review of patient's allergies indicates:   Allergen Reactions    Codeine     Demerol [meperidine]     Epinephrine      Chief Complaint   Patient presents with    [facility]     presents to ED via EMS for eval of "penetrating atherosclerotic ulcer" in aorta      Past Medical History:   Diagnosis Date    Hypertension        "

## 2019-09-04 NOTE — NURSING
Pt oriented to room and unit. Bed in lowest position with siderails up x2. Call bell within reach; pt instructed to call for assistance.  Pt updated with POC. Tele applied V/s taken. Assessment done.  Will continue to monitor.

## 2019-09-04 NOTE — ED NOTES
Assumed care of patient.  Cardiology at bedside letting patient know that she is going to CATH lab today.  Plan of care discussed and patient has no complaints or questions. Pt is in bed awake and alert. Pt is resting comfortably and is in no acute distress. Respirations are even and unlabored. Pt denies chest pain or SOB.     The bed is in low, locked position with side rails upx2. Call light is in reach, and patient is oriented to call light use. Pt states they will call nurse if they need assistance.    LOC: Patient name and date of birth verified. The patient is awake, alert and aware of environment with an appropriate affect, the patient is oriented x 3 and speaking appropriately.   APPEARANCE: Patient resting comfortably, patient is clean and well groomed, patient's clothing is properly fastened.  SKIN: The skin is warm and dry, color consistent with ethnicity, patient has normal skin turgor and moist mucus membranes, skin intact, no breakdown or bruising noted.  MUSCULOSKELETAL: Patient moving all extremities well, no obvious swelling or deformities noted.   RESPIRATORY: Respirations are spontaneous, patient has a normal effort and rate, no accessory muscle use noted.  CARDIAC: Patient has a normal rate and rhythm, no periphreal edema noted, capillary refill < 3 seconds. No chest pain  ABDOMEN: Soft and non tender to palpation, no distention noted. Bowel sounds present in all four quadrants.  NEUROLOGIC: Eyes open spontaneously, behavior appropriate to situation, follows commands, facial expression symmetrical, bilateral hand grasp equal and even, purposeful motor response noted, normal sensation in all extremities when touched with a finger.

## 2019-09-04 NOTE — HPI
74 y F with HTN and long smoking history presents with chest pain, troponin elevation of 28, and abnormal CT scan. Pain started on Saturday. It is primairly crampy/spasm in nature, starts on the anterior chest, but up to her throat, and then to her back. +nausea. She was evaluated at OSH and noted to have STEMI with troponin elevation. CTA showed showed small penetrating aortic ulcer. She was transferred to [facility] for further work up. On imaging there is no surrounding inflammation. No intramural hematoma. Small calcifications around the DEISY. Currently her chest pain has resolved

## 2019-09-04 NOTE — ASSESSMENT & PLAN NOTE
74 y F with HTN and long smoking history presents with chest pain, found to have STEMI on EKG and troponin 28. Also noted to have small penetrating aortic ulcer on CTA, without stranding or intramural hematoma. Calcifications presents around DEISY. Findings likely represent a chronic process; it is unlikely the cause of her chest pain, and should not prevent her from undergoing heart cath  - OK to proceed with LHC and anticoagulation from vascular standpoint  - recommend BP control <120 and HR control <80  - f/u echo results  - recommend transradial approach if possible to avoid passing catheters past DEISY  - no vascular intervention indicated at this time, especially in light of STEMI  - follow up with vascular surgery in 4-6 weeks after discharge to monitor DEISY

## 2019-09-04 NOTE — CONSULTS
[facility]  Vascular Surgery  Consult Note    Inpatient consult to Vascular Surgery  Consult performed by: Arlene Gtz MD  Consult ordered by: Juan Francisco Keller MD  Reason for consult: penetrating aortic ulcer    Inpatient consult to Vascular Surgery  Consult performed by: Arlene Gtz MD  Consult ordered by: Angel Burton MD        Subjective:     Chief Complaint/Reason for Admission: chest pain    History of Present Illness: 74 y F with HTN and long smoking history presents with chest pain, troponin elevation of 28, and abnormal CT scan. Pain started on Saturday. It is primairly crampy/spasm in nature, starts on the anterior chest, but up to her throat, and then to her back. +nausea. She was evaluated at OSH and noted to have STEMI with troponin elevation. CTA showed showed small penetrating aortic ulcer. [place] was [place] [facility] [place]. On imaging there is no surrounding inflammation. No intramural hematoma. Small calcifications around the DEISY. Currently her chest pain has resolved      (Not in a hospital admission)    Review of patient's allergies indicates:   Allergen Reactions    Codeine     Demerol [meperidine]     Epinephrine        Past Medical History:   Diagnosis Date    Hypertension      Past Surgical History:   Procedure Laterality Date    HYSTERECTOMY      TONSILLECTOMY       Family History     None        Tobacco Use    Smoking status: Current Every Day Smoker     Packs/day: 0.50    Smokeless tobacco: Never Used   Substance and Sexual Activity    Alcohol use: No    Drug use: No    Sexual activity: Not on file     Review of Systems   All other systems reviewed and are negative.    Objective:     Vital Signs (Most Recent):  Temp: 98.7 °F (37.1 °C) (09/03/19 2113)  Pulse: 86 (09/03/19 2113)  Resp: 16 (09/03/19 2113)  BP: (!) 140/83 (09/03/19 2113)  SpO2: 97 % (09/03/19 2113) Vital Signs (24h Range):  Temp:  [98.5 °F (36.9 °C)-98.7 °F (37.1 °C)]  98.7 °F (37.1 °C)  Pulse:  [85-94] 86  Resp:  [15-20] 16  SpO2:  [94 %-100 %] 97 %  BP: (135-157)/(70-86) 140/83     Weight: 71.7 kg (158 lb)  Body mass index is 25.5 kg/m².    Physical Exam   Constitutional: She appears well-developed and well-nourished.   HENT:   Head: Normocephalic and atraumatic.   Eyes: Pupils are equal, round, and reactive to light. EOM are normal.   Neck: Normal range of motion. Neck supple.   Cardiovascular: Normal rate and regular rhythm.   Pulmonary/Chest: Effort normal. No respiratory distress.   Abdominal: Soft. She exhibits no distension.       Significant Labs:  CBC:   Recent Labs   Lab 09/03/19  1705   WBC 10.31   RBC 4.62   HGB 13.3   HCT 40.5      MCV 88   MCH 28.8   MCHC 32.8     CMP:   Recent Labs   Lab 09/03/19  1705      CALCIUM 9.7   ALBUMIN 3.5   PROT 7.5      K 3.6   CO2 23      BUN 18   CREATININE 1.0   ALKPHOS 96   ALT 27   AST 51*   BILITOT 0.5     Troponin 28    Significant Diagnostics:  I have reviewed all pertinent imaging results/findings within the past 24 hours.   CTA shows small penetrating aortic ulcer. No surrounding inflammation. No intramural hematoma. Small calcifications around the DEISY.    Assessment/Plan:     Essential hypertension  BP control <120mmHg    Penetrating atherosclerotic ulcer of aorta  74 y F with HTN and long smoking history presents with chest pain, found to have STEMI on EKG and troponin 28. Also noted to have small penetrating aortic ulcer on CTA, without stranding or intramural hematoma. Calcifications presents around DEISY. Findings likely represent a chronic process; it is unlikely the cause of her chest pain, and should not prevent her from undergoing heart cath  - OK to proceed with LHC and anticoagulation from vascular standpoint  - recommend BP control <120 and HR control <80  - f/u echo results  - recommend transradial approach if possible to avoid passing catheters past DEISY  - no vascular intervention  indicated at this time, especially in light of STEMI  - follow up with vascular surgery in 4-6 weeks after discharge to monitor DEISY      Thank you for your consult. I will follow-up with patient. Please contact us if you have any additional questions.    Arlene Gtz MD  Vascular Surgery  [facility]    VASCULAR SURGERY ATTENDING ATTESTATION  I have reviewed and concur with the fellow's history, physical, assessment, and plan in the above consult note.  I have personally interviewed and examined the patient at bedside. See below addendum for my evaluation and additional findings.      In brief the patient is a 73yo F who presented with history of alternating/radiation chest/abdominal/back pain over the past 3 days. A CTA was performed at [facility] ER and showed chronic-appearing small DEISY at the supraceliac aorta. She was also found to have EKG changes suggestive of ischemia and troponin was found to be 27. She was transferred from the [facility] to [facility] because interventional cardiology did not feel comfortable treating her without vascular surgery assessment of the aorta. On examination, her abdomen is soft, NTND; and distal pulses are 2+ bilaterally. Her abdominal/chest pain has improved. Based on the imaging, her DEISY appears chronic and likely poses no immediate threat or risk of bleeding with anticoagulation. We recommend best medical management for DEISY with BP/HR control. Any coronary intervention should be performed trans-radially to avoid interferrence with the DEISY. In light of her signs/symptoms of myocardial ischemia, we will defer further management to cardiology. Plan follow up in vascular clinic in 4-6 weeks for repeat CTA to evaluate for any acute changes. If stable at that time, will transition to annual non-con CT.    MARILU Ward II, MD, VI  Vascular Surgeon  [facility]

## 2019-09-04 NOTE — ED NOTES
[facility] called back I informed them the troponin redraw orders dropped off and they reschedule them Q 8.

## 2019-09-04 NOTE — ASSESSMENT & PLAN NOTE
Consulted vascular surgery. Noted to have noted to have small penetrating aortic ulcer on CTA, without stranding or intramural hematoma. Calcifications presents around DEISY. Pt on heparin gtt  - Per vascular surgery, ok to proceed with LHC and anticoagulation from vascular standpoint  - recommend transradial approach if possible to avoid passing catheters past DEISY  - no vascular intervention indicated at this time, especially in light of STEMI

## 2019-09-04 NOTE — SUBJECTIVE & OBJECTIVE
Past Medical History:   Diagnosis Date    Hypertension        Past Surgical History:   Procedure Laterality Date    HYSTERECTOMY      TONSILLECTOMY         Review of patient's allergies indicates:   Allergen Reactions    Codeine     Demerol [meperidine]     Epinephrine          (Not in a hospital admission)  Family History     None        Tobacco Use    Smoking status: Current Every Day Smoker     Packs/day: 0.50    Smokeless tobacco: Never Used   Substance and Sexual Activity    Alcohol use: No    Drug use: No    Sexual activity: Not on file     Review of Systems   Constitution: Negative.   HENT: Negative.    Eyes: Negative.    Cardiovascular: Negative for chest pain, dyspnea on exertion, irregular heartbeat, near-syncope, orthopnea, palpitations and syncope.   Respiratory: Negative.    Endocrine: Negative.    Hematologic/Lymphatic: Negative.    Skin: Negative.    Musculoskeletal: Positive for muscle cramps.   Gastrointestinal: Negative.    Genitourinary: Negative.    Neurological: Negative.    Psychiatric/Behavioral: Negative.      Objective:     Vital Signs (Most Recent):  Temp: 99.5 °F (37.5 °C) (09/03/19 2358)  Pulse: 69(Simultaneous filing. User may not have seen previous data.) (09/04/19 0602)  Resp: 19 (09/04/19 0600)  BP: 108/61 (09/04/19 0602)  SpO2: 95 % (09/04/19 0602) Vital Signs (24h Range):  Temp:  [98.5 °F (36.9 °C)-99.5 °F (37.5 °C)] 99.5 °F (37.5 °C)  Pulse:  [69-94] 69  Resp:  [15-20] 19  SpO2:  [94 %-100 %] 95 %  BP: (108-195)/(61-86) 108/61     Weight: 71.7 kg (158 lb)  Body mass index is 25.5 kg/m².    SpO2: 95 %  O2 Device (Oxygen Therapy): room air    No intake or output data in the 24 hours ending 09/04/19 0721    Lines/Drains/Airways     Peripheral Intravenous Line                 Peripheral IV - Single Lumen 09/03/19 1705 18 G Left Antecubital less than 1 day         Peripheral IV - Single Lumen 09/03/19 1706 18 G Right Antecubital less than 1 day                Physical Exam    Constitutional: She is oriented to person, place, and time. She appears well-developed and well-nourished.   HENT:   Head: Normocephalic and atraumatic.   Eyes: Pupils are equal, round, and reactive to light. Conjunctivae are normal.   Neck: Normal range of motion. Neck supple. No JVD present.   Cardiovascular: Normal rate, regular rhythm, normal heart sounds and intact distal pulses.   No murmur heard.  Pulmonary/Chest: Effort normal and breath sounds normal.   Abdominal: Soft. Bowel sounds are normal.   Musculoskeletal: Normal range of motion. She exhibits no edema.   Neurological: She is alert and oriented to person, place, and time.   Skin: Skin is warm and dry. No erythema.   Psychiatric: She has a normal mood and affect.       Significant Labs:   CMP   Recent Labs   Lab 09/03/19  1705 09/04/19 0349    137   K 3.6 3.3*    105   CO2 23 21*    110   BUN 18 12   CREATININE 1.0 0.8   CALCIUM 9.7 9.0   PROT 7.5  --    ALBUMIN 3.5  --    BILITOT 0.5  --    ALKPHOS 96  --    AST 51*  --    ALT 27  --    ANIONGAP 11 11   ESTGFRAFRICA >60 >60.0   EGFRNONAA 56* >60.0   , CBC   Recent Labs   Lab 09/03/19  1705 09/04/19 0349   WBC 10.31 8.94   HGB 13.3 13.4   HCT 40.5 39.3    253   , INR   Recent Labs   Lab 09/03/19  1727 09/03/19  2300   INR 1.0 0.9   , Lipid Panel   Recent Labs   Lab 09/04/19  0349   CHOL 168   HDL 46   LDLCALC 98.8   TRIG 116   CHOLHDL 27.4    and Troponin   Recent Labs   Lab 09/03/19 2002 09/04/19  0349 09/04/19  0625   TROPONINI 27.512* 24.498*  24.498* 23.446*       Significant Imaging: EKG: NSR, 2 mm ST elevations in anterior and inferior leads

## 2019-09-04 NOTE — ASSESSMENT & PLAN NOTE
Stable  - c/w norvasc 10 mg PO daily  - started on GDMT with toprol  - coughing with ACEI reported

## 2019-09-04 NOTE — HPI
"Ms. Baron is a 74 YOF with PMHx of HTN and  tobacco abuse (0.5 ppd x "years") who presented as a transfer from  ER for evaluation of elevated troponin with ST changes on EKG and penetrating ulcer of descending aorta (seen on CTA C/A/P).  Patient reports normal health until she awoke abruptly Saturday morning with nausea and associated "muscle contractions" to anterior upper chest, BL neck, shoulders, and upper back.  She did not vomit, but was "uncomfortable".  She took two ibuprofen which improved her sxs and subsided.  Spasms did not worsen with exertion, and appeared worse with rest.  She denies other associated sxs including SOB, CP, palpitations, abd pain, diarrhea, dizziness.  The sxs returned twice over the next two days, within 20 minutes of eating.  She continued to feel progressively better but attributed symptoms to her gallbladder so she saw her PCP on 9/3. The EKG showed evidence of STEMI so she was sent to ED via EMS.  She denies any personal or family hx of cardiac disease.  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  Of note, she reports a "virus" x 2 weeks ago that kept her out of work for 2 days (2/2 "malaise") which is unusual for her. All past medical, social, and family history reviewed.     ED: [facility]. EKG shows ARTI to inferior leads and anterior leads.  Repeat EKG shows ARTI to inferior leads and V3.  Trop 28-->27.  Vascular surgery consulted and does not believe sxs 2/2 penetrating atherosclerotic ulcer (in descending aorta). Cardiology consulted and started on ACS protocol. The patient was admitted to the Hospital Medicine Service for further evaluation and management.     "

## 2019-09-04 NOTE — SUBJECTIVE & OBJECTIVE
"Past Medical History:   Diagnosis Date    Hypertension        Past Surgical History:   Procedure Laterality Date    HYSTERECTOMY      TONSILLECTOMY         Review of patient's allergies indicates:   Allergen Reactions    Codeine     Demerol [meperidine]     Epinephrine        No current facility-administered medications on file prior to encounter.      Current Outpatient Medications on File Prior to Encounter   Medication Sig    [DISCONTINUED] lisinopril (PRINIVIL,ZESTRIL) 20 MG tablet Take 25 mg by mouth once daily.     Family History     None        Tobacco Use    Smoking status: Current Every Day Smoker     Packs/day: 0.50    Smokeless tobacco: Never Used   Substance and Sexual Activity    Alcohol use: No    Drug use: No    Sexual activity: Not on file     Review of Systems   Constitutional: Negative for chills and fever.   HENT: Negative for congestion and rhinorrhea.    Eyes: Negative for photophobia and visual disturbance.   Respiratory: Negative for cough, chest tightness and shortness of breath.    Cardiovascular: Negative for chest pain, palpitations and leg swelling.        + "spasms" to chest, neck, shoulder, back   Gastrointestinal: Positive for nausea. Negative for abdominal pain, diarrhea and vomiting.   Genitourinary: Negative for difficulty urinating and dysuria.   Musculoskeletal: Negative for back pain and gait problem.   Skin: Negative for rash and wound.   Neurological: Negative for dizziness and headaches.   Psychiatric/Behavioral: Negative for agitation and confusion.     Objective:     Vital Signs (Most Recent):  Temp: 99.5 °F (37.5 °C) (09/03/19 2358)  Pulse: 86 (09/03/19 2322)  Resp: 16 (09/03/19 2113)  BP: (!) 151/79 (09/03/19 2322)  SpO2: 97 % (09/03/19 2322) Vital Signs (24h Range):  Temp:  [98.5 °F (36.9 °C)-99.5 °F (37.5 °C)] 99.5 °F (37.5 °C)  Pulse:  [85-94] 86  Resp:  [15-20] 16  SpO2:  [94 %-100 %] 97 %  BP: (135-157)/(70-86) 151/79     Weight: 71.7 kg (158 lb)  Body " mass index is 25.5 kg/m².    Physical Exam   Constitutional: She is oriented to person, place, and time. She appears well-developed and well-nourished.   HENT:   Head: Normocephalic and atraumatic.   Eyes: Pupils are equal, round, and reactive to light. EOM are normal.   Neck: Normal range of motion. Neck supple. No JVD (No evidence of JVD) present.   Cardiovascular: Normal rate, regular rhythm, normal heart sounds and intact distal pulses.   No murmur heard.  No carotid bruit   Pulmonary/Chest: Effort normal and breath sounds normal. No respiratory distress. She has no wheezes. She has no rales. She exhibits no tenderness.   No reproducible chest or neck/shoulder tenderness   Abdominal: Soft. Bowel sounds are normal.   Musculoskeletal: Normal range of motion. She exhibits no edema.   No edema on exam   Neurological: She is alert and oriented to person, place, and time.   Skin: Skin is warm and dry.   Psychiatric: She has a normal mood and affect. Her behavior is normal. Judgment and thought content normal.   Mildly anxious   Nursing note and vitals reviewed.        CRANIAL NERVES     CN III, IV, VI   Pupils are equal, round, and reactive to light.  Extraocular motions are normal.        Significant Labs:   CBC:   Recent Labs   Lab 09/03/19  1705   WBC 10.31   HGB 13.3   HCT 40.5        CMP:   Recent Labs   Lab 09/03/19  1705      K 3.6      CO2 23      BUN 18   CREATININE 1.0   CALCIUM 9.7   PROT 7.5   ALBUMIN 3.5   BILITOT 0.5   ALKPHOS 96   AST 51*   ALT 27   ANIONGAP 11   EGFRNONAA 56*     Troponin:   Recent Labs   Lab 09/03/19 1705 09/03/19 2002   TROPONINI 28.103* 27.512*     TSH:   Recent Labs   Lab 09/03/19  1705   TSH 1.433       Significant Imaging: I have reviewed all pertinent imaging results/findings within the past 24 hours.   Cta Chest Abdomen Pelvis    Result Date: 9/3/2019  EXAMINATION: CTA OF CHEST ABDOMEN PELVIS WITH CLINICAL HISTORY: Chest pain radiating to the back.  TECHNIQUE: 1.25 mm enhanced axial images were obtained from the lung apices through the greater trochanters. Seventy-five mL of Omnipaque 300 was injected. COMPARISON: None. FINDINGS: In the chest, there is aneurysmal dilatation or dissection of the aorta.  There are no displaced rib fractures. There is no pleural or pericardial effusion.  Mild vascular calcification is seen at the aortic arch.  The heart size is within normal limits. There is mild left basilar atelectasis versus subtle infiltrate.  There is linear atelectasis in the right middle lobe.  There is bilateral breast prosthesis. In the abdomen, a penetrating atherosclerotic ulcer is seen to the left of the celiac axis.  There is ectasia of the abdominal aorta.  At the level of the right renal artery, the abdominal aorta measures up to 2.6 cm in maximal width.  There is mild mural thrombus and moderate vascular calcifications.  The celiac axis, SMA, and RAH are all patent.  The spleen, pancreas, right kidney, adrenal glands, and gallbladder are unremarkable.  There is fatty infiltration of the liver.  There is a simple left renal cortical cyst.  There is no pneumoperitoneum or free fluid detected.  There is a retroaortic left renal vein. In the pelvis, there is no free fluid.  There is a 4.5 x 3.7 cm right pelvic mass measuring -33 Hounsfield units.  The uterus is surgically absent.  There is grade 1 anterolisthesis of L3 on L4 and L4 on L5.  Multilevel degenerative changes present.  Severe degenerative change is seen at L4/L5 with endplate sclerosis, near complete loss of the intervertebral disc space, and vacuum phenomena.     Penetrating atherosclerotic ulcer involving the suprarenal abdominal aorta.  Findings may account for acute chest pain.  Moderate atherosclerotic disease. Mild left basilar atelectasis versus early infiltrate. Fat containing right pelvic mass.  Findings are concerning for teratoma.

## 2019-09-04 NOTE — SUBJECTIVE & OBJECTIVE
Medications:  Continuous Infusions:   heparin (porcine) in D5W 12 Units/kg/hr (09/03/19 2355)     Scheduled Meds:   amLODIPine  10 mg Oral Daily    [START ON 9/5/2019] aspirin  81 mg Oral Daily    atorvastatin  80 mg Oral Daily    metoprolol tartrate  25 mg Oral BID    ticagrelor  90 mg Oral BID     PRN Meds:acetaminophen, albuterol-ipratropium, dextrose 10 % in water (D10W), dextrose 10 % in water (D10W), glucagon (human recombinant), glucose, glucose, heparin (PORCINE), heparin (PORCINE), nitroGLYCERIN, ondansetron, promethazine (PHENERGAN) IVPB, ramelteon, senna-docusate 8.6-50 mg, sodium chloride 0.9%     Objective:     Vital Signs (Most Recent):  Temp: 99.5 °F (37.5 °C) (09/03/19 2358)  Pulse: 82 (09/04/19 0402)  Resp: 17 (09/04/19 0402)  BP: 123/67 (09/04/19 0402)  SpO2: 97 % (09/04/19 0402) Vital Signs (24h Range):  Temp:  [98.5 °F (36.9 °C)-99.5 °F (37.5 °C)] 99.5 °F (37.5 °C)  Pulse:  [74-94] 82  Resp:  [15-20] 17  SpO2:  [94 %-100 %] 97 %  BP: (123-195)/(67-86) 123/67         Physical Exam    Significant Labs:  {Results:47410}    Significant Diagnostics:  {Imaging Review:10197}

## 2019-09-04 NOTE — CONSULTS
"9/3/2019    CC: chest pain     HPI:  Ella Baron is a 74 y.o. lady who presented with chest pain.     She has a hx of hypertension and long standing smoker who presented to her PCP today with "chest spasms" all across the chest which radiated to her back. She states that the spasms come and go nothing seems to make them better or worse. Over the weekend they were very bad and she was unable to get much sleep. PCP got an EKG which was concerning so sent her to the ER. STEMI was noted. CTA was done with penetrating ulcer in descending aorta. Vascular surgery was consulted and was not OK with heparin so angiogram was not done. Then Vascular recommended transfer here. She is chest pain free currently.     PMH:  Past Medical History:   Diagnosis Date    Hypertension        PSH:  Past Surgical History:   Procedure Laterality Date    HYSTERECTOMY      TONSILLECTOMY         Family:  History reviewed. No pertinent family history.    Social:  Social History     Socioeconomic History    Marital status:      Spouse name: Not on file    Number of children: Not on file    Years of education: Not on file    Highest education level: Not on file   Occupational History    Not on file   Social Needs    Financial resource strain: Not on file    Food insecurity:     Worry: Not on file     Inability: Not on file    Transportation needs:     Medical: Not on file     Non-medical: Not on file   Tobacco Use    Smoking status: Current Every Day Smoker     Packs/day: 0.50    Smokeless tobacco: Never Used   Substance and Sexual Activity    Alcohol use: No    Drug use: No    Sexual activity: Not on file   Lifestyle    Physical activity:     Days per week: Not on file     Minutes per session: Not on file    Stress: Not on file   Relationships    Social connections:     Talks on phone: Not on file     Gets together: Not on file     Attends [employer] service: Not on file     Active member of club or organization: Not on " file     Attends meetings of clubs or organizations: Not on file     Relationship status: Not on file   Other Topics Concern    Not on file   Social History Narrative    Not on file       Medications:  No current facility-administered medications on file prior to encounter.      Current Outpatient Medications on File Prior to Encounter   Medication Sig Dispense Refill    lisinopril (PRINIVIL,ZESTRIL) 20 MG tablet Take 25 mg by mouth once daily.         Allergies:  Review of patient's allergies indicates:   Allergen Reactions    Codeine     Demerol [meperidine]     Epinephrine        Tobacco, alcohol, drugs:  Social History     Tobacco Use   Smoking Status Current Every Day Smoker    Packs/day: 0.50   Smokeless Tobacco Never Used     Social History     Substance and Sexual Activity   Alcohol Use No     Social History     Substance and Sexual Activity   Drug Use No       ROS:  Review of Systems   All other systems reviewed and are negative.      Vitals:  Temp: 98.7 °F (37.1 °C) (09/03/19 2113)  Pulse: 86 (09/03/19 2113)  Resp: 16 (09/03/19 2113)  BP: (!) 140/83 (09/03/19 2113)  SpO2: 97 % (09/03/19 2113)  Temp:  [98.5 °F (36.9 °C)-98.7 °F (37.1 °C)]   Pulse:  [85-94]   Resp:  [15-20]   BP: (135-157)/(70-86)   SpO2:  [94 %-100 %]     Ins/Outs:  No intake or output data in the 24 hours ending 09/03/19 2247    PE:  Physical Exam   GEN: Alert and oriented in NAD  NECK:no JVD appreciated   CVS: RRR, s1/s2, no MRG  PULM: diminished breath sounds bilaterally.   ABD: NT/ND BS +  Extremities: warm and dry, palpable pulses, no edema  NEURO: Alert and oriented x 3  PSYCH: appropriate affect.         Labs:  CBC with Diff:   Recent Labs   Lab 09/03/19  1705   WBC 10.31   HGB 13.3   HCT 40.5      LYMPH 34.9  3.6   MONO 8.5  0.9   EOSINOPHIL 2.1       COAG:  Recent Labs   Lab 09/03/19  1727   APTT 28.7   INR 1.0       CMP:   Recent Labs   Lab 09/03/19  1705      CALCIUM 9.7   ALBUMIN 3.5   PROT 7.5       K 3.6   CO2 23      BUN 18   CREATININE 1.0   ALKPHOS 96   ALT 27   AST 51*   BILITOT 0.5     Estimated Creatinine Clearance: 50.1 mL/min (based on SCr of 1 mg/dL).    .  Recent Labs   Lab 09/03/19  1705 09/03/19 2002   *  --    TROPONINI 28.103* 27.512*         Diagnostic Results:  Ejection Fractions   No results found for: EF     Assessment and Plan  Ella Baron is a 74 y.o. lady who presented with chest pain.     Chest pain   Pt with chest pain with radiation to the back and ST elevation on ekg in inferior and anterior leads concerning for STEMI. CTA with penetrating descending aortic ulcer (vascular consulted now OK with anticoagulation). Repeat EKG here still with ST elevation present however patient does not have chest pain at this time. Differential includes STEMI, myopericarditis, takotsubo.     Plan   Admit to IM-C  ASA/Brilinta   Heparin gttt  Interventional consult  Echo   Angiogram tomorrow likely will need to use right radial access   Statin  Strict blood pressure control (Home amlodipine and BB)  If she has chest pain during the night please call     Lili Stephens MD  Cardiology Fellow  Pager [phone]

## 2019-09-04 NOTE — SUBJECTIVE & OBJECTIVE
(Not in a hospital admission)    Review of patient's allergies indicates:   Allergen Reactions    Codeine     Demerol [meperidine]     Epinephrine        Past Medical History:   Diagnosis Date    Hypertension      Past Surgical History:   Procedure Laterality Date    HYSTERECTOMY      TONSILLECTOMY       Family History     None        Tobacco Use    Smoking status: Current Every Day Smoker     Packs/day: 0.50    Smokeless tobacco: Never Used   Substance and Sexual Activity    Alcohol use: No    Drug use: No    Sexual activity: Not on file     Review of Systems   All other systems reviewed and are negative.    Objective:     Vital Signs (Most Recent):  Temp: 98.7 °F (37.1 °C) (09/03/19 2113)  Pulse: 86 (09/03/19 2113)  Resp: 16 (09/03/19 2113)  BP: (!) 140/83 (09/03/19 2113)  SpO2: 97 % (09/03/19 2113) Vital Signs (24h Range):  Temp:  [98.5 °F (36.9 °C)-98.7 °F (37.1 °C)] 98.7 °F (37.1 °C)  Pulse:  [85-94] 86  Resp:  [15-20] 16  SpO2:  [94 %-100 %] 97 %  BP: (135-157)/(70-86) 140/83     Weight: 71.7 kg (158 lb)  Body mass index is 25.5 kg/m².    Physical Exam   Constitutional: She appears well-developed and well-nourished.   HENT:   Head: Normocephalic and atraumatic.   Eyes: Pupils are equal, round, and reactive to light. EOM are normal.   Neck: Normal range of motion. Neck supple.   Cardiovascular: Normal rate and regular rhythm.   Pulmonary/Chest: Effort normal. No respiratory distress.   Abdominal: Soft. She exhibits no distension.       Significant Labs:  CBC:   Recent Labs   Lab 09/03/19  1705   WBC 10.31   RBC 4.62   HGB 13.3   HCT 40.5      MCV 88   MCH 28.8   MCHC 32.8     CMP:   Recent Labs   Lab 09/03/19  1705      CALCIUM 9.7   ALBUMIN 3.5   PROT 7.5      K 3.6   CO2 23      BUN 18   CREATININE 1.0   ALKPHOS 96   ALT 27   AST 51*   BILITOT 0.5     Troponin 28    Significant Diagnostics:  I have reviewed all pertinent imaging results/findings within the past 24 hours.    CTA shows small penetrating aortic ulcer. No surrounding inflammation. No intramural hematoma. Small calcifications around the DEISY.

## 2019-09-04 NOTE — ED NOTES
Telemetry Verification   Patient placed on Telemetry Box  Verified on ED monitor  Box [id number]   Monitor Tech teleroom    Rate 83   Rhythm nsr

## 2019-09-04 NOTE — ASSESSMENT & PLAN NOTE
- Continue amlodipine, beta-blocker  - Per vascular surgery, goal BP (SBP < 120) with HR control < 80

## 2019-09-04 NOTE — ED NOTES
Bed: 04  Expected date: 9/3/19  Expected time: 8:53 PM  Means of arrival:   Comments:  Yeni Maldonado

## 2019-09-04 NOTE — NURSING
Patient identified by 2 identifiers. Denies previous reactions to blood transfusions, allergies reviewed & procedure explained.  18 g IV in place to Rt AC, flushed w/ 10cc NS pre & post contrast administration.  3cc Optison administered per echo tech, echo images obtained.  Pt tolerated procedure well.

## 2019-09-04 NOTE — ED NOTES
CATH Lab contacted per family's request.  Family  Called cath lab to check on status of patients cath procedure and was told that it was cancelled.  Family upset due to them being told she was going to have it.  Cardiology told me this morning that she was going to be going some time today to have the procedure.   CATH lab nurse Charles states he is going to send someone down to speak with the patient regarding her cath procedure being cancelled.  Family made aware.

## 2019-09-04 NOTE — CONSULTS
"[facility]  Interventional Cardiology  Consult Note    Patient Name: Ella Baron  MRN: [id number]  Admission Date: 9/3/2019  Hospital Length of Stay: 0 days  Code Status: Full Code   Attending Provider: Hanna Ho MD  Consulting Provider: Betty Lopez MD  Primary Care Physician: Verna Moreno MD  Principal Problem:Acute ST elevation myocardial infarction (STEMI)    Patient information was obtained from patient and ER records.     Inpatient consult to Interventional Cardiology  Consult performed by: Betty Lopez MD  Consult ordered by: Fidel Argueta DO        Subjective:     Chief Complaint:  Chest spasms     HPI:  Ella Baron is a 74 y.o. lady who presented with chest pain.      She has a hx of hypertension and long standing smoker who presented to her PCP today with "chest spasms" all across the chest which radiated to her back and her arms. She states that the spasms come and go and appear to be associated with eating. They first occurred Saturday and prevented her from sleeping well. She went to see her PCP yesterday  who got an EKG which was concerning so sent her to the ER. STEMI was noted in anterior/inferior leads.. CTA was done with penetrating ulcer in descending aorta. Vascular surgery was consulted and did not feel safe proceeding as pt had received heparin so coronary angiogram was not undergone. Then vascular recommended transfer here. She is chest pain free currently. Reports chronic stress in her life as well as the death of her brother-in-law three weeks ago.     Interventional Cardiology consulted to determine need for LHC/coronary angiogram.   EKG notable for anterior/inferior ST elevations with troponin elevated to 28 on admission (now down to 23). Was treated in the ER with ASA, Brilinta, heparin gtt, statin; remained on home amlodipine and beta-blocker.     Past Medical History:   Diagnosis Date    Hypertension        Past Surgical History:   Procedure " Laterality Date    HYSTERECTOMY      TONSILLECTOMY         Review of patient's allergies indicates:   Allergen Reactions    Codeine     Demerol [meperidine]     Epinephrine          (Not in a hospital admission)  Family History     None        Tobacco Use    Smoking status: Current Every Day Smoker     Packs/day: 0.50    Smokeless tobacco: Never Used   Substance and Sexual Activity    Alcohol use: No    Drug use: No    Sexual activity: Not on file     Review of Systems   Constitution: Negative.   HENT: Negative.    Eyes: Negative.    Cardiovascular: Negative for chest pain, dyspnea on exertion, irregular heartbeat, near-syncope, orthopnea, palpitations and syncope.   Respiratory: Negative.    Endocrine: Negative.    Hematologic/Lymphatic: Negative.    Skin: Negative.    Musculoskeletal: Positive for muscle cramps.   Gastrointestinal: Negative.    Genitourinary: Negative.    Neurological: Negative.    Psychiatric/Behavioral: Negative.      Objective:     Vital Signs (Most Recent):  Temp: 99.5 °F (37.5 °C) (09/03/19 2358)  Pulse: 69(Simultaneous filing. User may not have seen previous data.) (09/04/19 0602)  Resp: 19 (09/04/19 0600)  BP: 108/61 (09/04/19 0602)  SpO2: 95 % (09/04/19 0602) Vital Signs (24h Range):  Temp:  [98.5 °F (36.9 °C)-99.5 °F (37.5 °C)] 99.5 °F (37.5 °C)  Pulse:  [69-94] 69  Resp:  [15-20] 19  SpO2:  [94 %-100 %] 95 %  BP: (108-195)/(61-86) 108/61     Weight: 71.7 kg (158 lb)  Body mass index is 25.5 kg/m².    SpO2: 95 %  O2 Device (Oxygen Therapy): room air    No intake or output data in the 24 hours ending 09/04/19 0721    Lines/Drains/Airways     Peripheral Intravenous Line                 Peripheral IV - Single Lumen 09/03/19 1705 18 G Left Antecubital less than 1 day         Peripheral IV - Single Lumen 09/03/19 1706 18 G Right Antecubital less than 1 day                Physical Exam   Constitutional: She is oriented to person, place, and time. She appears well-developed and  well-nourished.   HENT:   Head: Normocephalic and atraumatic.   Eyes: Pupils are equal, round, and reactive to light. Conjunctivae are normal.   Neck: Normal range of motion. Neck supple. No JVD present.   Cardiovascular: Normal rate, regular rhythm, normal heart sounds and intact distal pulses.   No murmur heard.  Pulmonary/Chest: Effort normal and breath sounds normal.   Abdominal: Soft. Bowel sounds are normal.   Musculoskeletal: Normal range of motion. She exhibits no edema.   Neurological: She is alert and oriented to person, place, and time.   Skin: Skin is warm and dry. No erythema.   Psychiatric: She has a normal mood and affect.       Significant Labs:   CMP   Recent Labs   Lab 09/03/19  1705 09/04/19  0349    137   K 3.6 3.3*    105   CO2 23 21*    110   BUN 18 12   CREATININE 1.0 0.8   CALCIUM 9.7 9.0   PROT 7.5  --    ALBUMIN 3.5  --    BILITOT 0.5  --    ALKPHOS 96  --    AST 51*  --    ALT 27  --    ANIONGAP 11 11   ESTGFRAFRICA >60 >60.0   EGFRNONAA 56* >60.0   , CBC   Recent Labs   Lab 09/03/19  1705 09/04/19  0349   WBC 10.31 8.94   HGB 13.3 13.4   HCT 40.5 39.3    253   , INR   Recent Labs   Lab 09/03/19  1727 09/03/19  2300   INR 1.0 0.9   , Lipid Panel   Recent Labs   Lab 09/04/19  0349   CHOL 168   HDL 46   LDLCALC 98.8   TRIG 116   CHOLHDL 27.4    and Troponin   Recent Labs   Lab 09/03/19 2002 09/04/19  0349 09/04/19  0625   TROPONINI 27.512* 24.498*  24.498* 23.446*       Significant Imaging: EKG: NSR, 2 mm ST elevations in anterior and inferior leads    Assessment and Plan:     * Acute ST elevation myocardial infarction (STEMI)  Patient is presenting with chest pain and ST elevation in anterior/inferior leads with troponin elevated to 28 with evidence of STEMI. Etiology may be 2/2 ACS vs. Myopericarditis vs. Takotsubo's CM. Also ordered TTE (pending). However, as symptoms began on Saturday, outside window for acute intervention with PCI or thrombolytics.  - Given  this finding, will defer coronary angiogram/LHC for now  - Ordered TTE to determine if acute WMAs -> pending  - Continue medical management of ACS: treated with ASA/Brilinta, statin, beta-blocker, on heparin gtt      Essential hypertension  - Continue amlodipine, beta-blocker  - Per vascular surgery, goal BP (SBP < 120) with HR control < 80        Penetrating atherosclerotic ulcer of aorta  Consulted vascular surgery. Noted to have noted to have small penetrating aortic ulcer on CTA, without stranding or intramural hematoma. Calcifications presents around DEISY. Pt on heparin gtt  - Per vascular surgery, ok to proceed with LHC and anticoagulation from vascular standpoint  - recommend transradial approach if possible to avoid passing catheters past DEISY  - no vascular intervention indicated at this time, especially in light of STEMI      VTE Risk Mitigation (From admission, onward)        Ordered     heparin 25,000 units in dextrose 5% (100 units/ml) IV bolus from bag - ADDITIONAL PRN BOLUS - 60 units/kg (max bolus 4000 units)  As needed (PRN)      09/03/19 2245     heparin 25,000 units in dextrose 5% (100 units/ml) IV bolus from bag - ADDITIONAL PRN BOLUS - 30 units/kg (max bolus 4000 units)  As needed (PRN)      09/03/19 2245     IP VTE HIGH RISK PATIENT  Once      09/04/19 0207     Place DUGLAS hose  Until discontinued      09/04/19 0207     Place sequential compression device  Until discontinued      09/04/19 0207     heparin 25,000 units in dextrose 5% 250 mL (100 units/mL) infusion LOW INTENSITY nomogram - [facility]  Continuous      09/03/19 2245          Thank you for your consult. I will sign off. Please contact us if you have any additional questions.    Betty Lopez MD  Interventional Cardiology   [facility]

## 2019-09-04 NOTE — ASSESSMENT & PLAN NOTE
Patient is presenting with chest pain and ST elevation in anterior/inferior leads with troponin elevated to 28 with evidence of STEMI. Etiology may be 2/2 ACS vs. Myopericarditis vs. Takotsubo's CM. Also ordered TTE (pending). However, as symptoms began on Saturday, outside window for acute intervention with PCI or thrombolytics.  - Given this finding, will defer coronary angiogram/LHC for now  - Ordered TTE to determine if acute WMAs -> pending  - Continue medical management of ACS: treated with ASA/Brilinta, statin, beta-blocker, on heparin gtt

## 2019-09-04 NOTE — ED NOTES
CARDS at bedside speaking with family regarding her cath being cancelled.  Family is aware and understands.  Will order patient a diet tray.

## 2019-09-04 NOTE — ED NOTES
Patient endorsing intermittent shortness of breath.  Oxygen saturation with good waveform per the monitor is between %.  Dr. Ho advised of same and at bedside.

## 2019-09-04 NOTE — ASSESSMENT & PLAN NOTE
- evaluated by vascular surgery and no acute intervention needed  - Findings likely represent a chronic process  - BP control <120, HR control <80  - transradial approach for LHC to avoid passing DEISY  - f/u with vascular surgery in 4-6 weeks

## 2019-09-04 NOTE — HPI
"Ella Baron is a 74 y.o. lady who presented with chest pain.      She has a hx of hypertension and long standing smoker who presented to her PCP today with "chest spasms" all across the chest which radiated to her back and her arms. She states that the spasms come and go and appear to be associated with eating. They first occurred Saturday and prevented her from sleeping well. She went to see her PCP yesterday  who got an EKG which was concerning so sent her to the ER. STEMI was noted in anterior/inferior leads.. CTA was done with penetrating ulcer in descending aorta. Vascular surgery was consulted and did not feel safe proceeding as pt had received heparin so coronary angiogram was not undergone. Then vascular recommended transfer here. She is chest pain free currently. Reports chronic stress in her life as well as the death of her brother-in-law three weeks ago.     Interventional Cardiology consulted to determine need for LHC/coronary angiogram.  EKG notable for anterior/inferior ST elevations with troponin elevated to 28 on admission (now down to 23). Was treated in the ER with ASA, Brilinta, heparin gtt, statin; remained on home amlodipine and beta-blocker.   "

## 2019-09-04 NOTE — ASSESSMENT & PLAN NOTE
Elevated troponin and EKG changes concerning for STEMI.  Medical management only started on admission as she is asymptomatic.  Discussed patient with cardiology (no emergent need for LHC).  - NSTEMI pathway started; ACS protocol initiated  - Evaluated by vascular surgery and cardiology.    - Formal echo in AM; NPO for LHC   - BP control, smoking cessation  - Interventional cardiology to be consulted in AM  - Myocarditis on differential as she had recent possible viral infection.   - monitor on tele, trend troponins

## 2019-09-04 NOTE — H&P
"[facility] Hospital Medicine  History & Physical    Patient Name: Ella Baron  MRN: [id number]  Admission Date: 9/3/2019  Attending Physician: Hanna Ho MD   Primary Care Provider: Verna Moreno MD    [facility] Medicine Team: [facility] MED  Fidel Argueta DO.    Patient information was obtained from patient, past medical records and ER records.     Subjective:     Principal Problem:Acute ST elevation myocardial infarction (STEMI)    Chief Complaint:   Chief Complaint   Patient presents with    [facility] Transfer     presents to ED via EMS for eval of "penetrating atherosclerotic ulcer" in aorta         HPI: Ella Baron is a 74 y.o. WF with pmhx HTN, tobacco abuse (0.5 ppd x "years") who presents as a transfer from  ER for evaluation of elevated troponin and penetrating ulcer of descending aorta (seen on CTA C/A/P).  Patient reports normal health until she awoke abruptly Saturday morning with nausea and associated "muscle contractions" to anterior upper chest, BL neck, shoulders, and upper back.  She did not vomit, but was "uncomfortable".  She took two ibuprofen which improved her sxs and subsided.  Spasms did not worsen with exertion, and appeared worse with rest.  She denies other associated sxs including SOB, CP, palpitations, abd pain, diarrhea, dizziness.  The sxs returned twice over the next two days, within 20 minutes of eating.  She continued to feel progressively better but attributed symptoms to her gallbladder so she saw her PCP on 9/3.  The EKG showed evidence of STEMI so she was sent to ED via EMS.  She denies asny personal or family hx of cardiac disease.  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  Of note, she reports a "virus" x2 weeks ago that kept her out of work for 2 days (2/2 "malaise") which is unusual for her.     ED: AFVSS.  EKG shows ARTI to inferior leads and anterior leads.  Repeat EKG shows ARTI to inferior leads and V3.  Trop 28-->27.  Vascular " "surgery consulted and does not believe sxs 2/2 penetrating atherosclerotic ulcer (in descending aorta).  Cardiology consulted and started on ACS protocol.     Past Medical History:   Diagnosis Date    Hypertension        Past Surgical History:   Procedure Laterality Date    HYSTERECTOMY      TONSILLECTOMY         Review of patient's allergies indicates:   Allergen Reactions    Codeine     Demerol [meperidine]     Epinephrine        No current facility-administered medications on file prior to encounter.      Current Outpatient Medications on File Prior to Encounter   Medication Sig    [DISCONTINUED] lisinopril (PRINIVIL,ZESTRIL) 20 MG tablet Take 25 mg by mouth once daily.     Family History     None        Tobacco Use    Smoking status: Current Every Day Smoker     Packs/day: 0.50    Smokeless tobacco: Never Used   Substance and Sexual Activity    Alcohol use: No    Drug use: No    Sexual activity: Not on file     Review of Systems   Constitutional: Negative for chills and fever.   HENT: Negative for congestion and rhinorrhea.    Eyes: Negative for photophobia and visual disturbance.   Respiratory: Negative for cough, chest tightness and shortness of breath.    Cardiovascular: Negative for chest pain, palpitations and leg swelling.        + "spasms" to chest, neck, shoulder, back   Gastrointestinal: Positive for nausea. Negative for abdominal pain, diarrhea and vomiting.   Genitourinary: Negative for difficulty urinating and dysuria.   Musculoskeletal: Negative for back pain and gait problem.   Skin: Negative for rash and wound.   Neurological: Negative for dizziness and headaches.   Psychiatric/Behavioral: Negative for agitation and confusion.     Objective:     Vital Signs (Most Recent):  Temp: 99.5 °F (37.5 °C) (09/03/19 2358)  Pulse: 86 (09/03/19 2322)  Resp: 16 (09/03/19 2113)  BP: (!) 151/79 (09/03/19 2322)  SpO2: 97 % (09/03/19 2322) Vital Signs (24h Range):  Temp:  [98.5 °F (36.9 °C)-99.5 °F " (37.5 °C)] 99.5 °F (37.5 °C)  Pulse:  [85-94] 86  Resp:  [15-20] 16  SpO2:  [94 %-100 %] 97 %  BP: (135-157)/(70-86) 151/79     Weight: 71.7 kg (158 lb)  Body mass index is 25.5 kg/m².    Physical Exam   Constitutional: She is oriented to person, place, and time. She appears well-developed and well-nourished.   HENT:   Head: Normocephalic and atraumatic.   Eyes: Pupils are equal, round, and reactive to light. EOM are normal.   Neck: Normal range of motion. Neck supple.   Cardiovascular: Normal rate, regular rhythm, normal heart sounds and intact distal pulses.   No murmur heard.  Pulmonary/Chest: Effort normal and breath sounds normal. No respiratory distress. She has no wheezes. She has no rales. She exhibits no tenderness.   No reproducible chest or neck/shoulder tenderness   Abdominal: Soft. Bowel sounds are normal.   Musculoskeletal: Normal range of motion. She exhibits no edema.   No edema on exam   Neurological: She is alert and oriented to person, place, and time.   Skin: Skin is warm and dry.   Psychiatric: She has a normal mood and affect. Her behavior is normal. Judgment and thought content normal.   Mildly anxious   Nursing note and vitals reviewed.        CRANIAL NERVES     CN III, IV, VI   Pupils are equal, round, and reactive to light.  Extraocular motions are normal.        Significant Labs:   CBC:   Recent Labs   Lab 09/03/19  1705   WBC 10.31   HGB 13.3   HCT 40.5        CMP:   Recent Labs   Lab 09/03/19  1705      K 3.6      CO2 23      BUN 18   CREATININE 1.0   CALCIUM 9.7   PROT 7.5   ALBUMIN 3.5   BILITOT 0.5   ALKPHOS 96   AST 51*   ALT 27   ANIONGAP 11   EGFRNONAA 56*     Troponin:   Recent Labs   Lab 09/03/19 1705 09/03/19 2002   TROPONINI 28.103* 27.512*     TSH:   Recent Labs   Lab 09/03/19 1705   TSH 1.433       Significant Imaging: I have reviewed all pertinent imaging results/findings within the past 24 hours.   Cta Chest Abdomen Pelvis    Result Date:  9/3/2019  EXAMINATION: CTA OF CHEST ABDOMEN PELVIS WITH CLINICAL HISTORY: Chest pain radiating to the back. TECHNIQUE: 1.25 mm enhanced axial images were obtained from the lung apices through the greater trochanters. Seventy-five mL of Omnipaque 300 was injected. COMPARISON: None. FINDINGS: In the chest, there is aneurysmal dilatation or dissection of the aorta.  There are no displaced rib fractures. There is no pleural or pericardial effusion.  Mild vascular calcification is seen at the aortic arch.  The heart size is within normal limits. There is mild left basilar atelectasis versus subtle infiltrate.  There is linear atelectasis in the right middle lobe.  There is bilateral breast prosthesis. In the abdomen, a penetrating atherosclerotic ulcer is seen to the left of the celiac axis.  There is ectasia of the abdominal aorta.  At the level of the right renal artery, the abdominal aorta measures up to 2.6 cm in maximal width.  There is mild mural thrombus and moderate vascular calcifications.  The celiac axis, SMA, and RAH are all patent.  The spleen, pancreas, right kidney, adrenal glands, and gallbladder are unremarkable.  There is fatty infiltration of the liver.  There is a simple left renal cortical cyst.  There is no pneumoperitoneum or free fluid detected.  There is a retroaortic left renal vein. In the pelvis, there is no free fluid.  There is a 4.5 x 3.7 cm right pelvic mass measuring -33 Hounsfield units.  The uterus is surgically absent.  There is grade 1 anterolisthesis of L3 on L4 and L4 on L5.  Multilevel degenerative changes present.  Severe degenerative change is seen at L4/L5 with endplate sclerosis, near complete loss of the intervertebral disc space, and vacuum phenomena.     Penetrating atherosclerotic ulcer involving the suprarenal abdominal aorta.  Findings may account for acute chest pain.  Moderate atherosclerotic disease. Mild left basilar atelectasis versus early infiltrate. Fat containing  right pelvic mass.  Findings are concerning for teratoma.     Assessment/Plan:     * Acute ST elevation myocardial infarction (STEMI)  Elevated troponin and EKG changes concerning for STEMI.  Medical management only started on admission as she is asymptomatic.  Discussed patient with cardiology (no emergent need for LHC).  - NSTEMI pathway started; ACS protocol initiated  - Evaluated by vascular surgery and cardiology.    - Formal echo in AM; NPO for LHC   - BP control, smoking cessation  - Interventional cardiology to be consulted in AM  - Myocarditis on differential as she had recent possible viral infection.   - monitor on tele, trend troponins    Penetrating atherosclerotic ulcer of aorta  - evaluated by vascular surgery and no acute intervention needed  - Findings likely represent a chronic process  - BP control <120, HR control <80  - transradial approach for LHC to avoid passing DEISY  - f/u with vascular surgery in 4-6 weeks    Essential hypertension  Stable  - c/w norvasc 10 mg PO daily  - started on GDMT with toprol  - coughing with ACEI reported    Tobacco abuse  - advised smoking cessation  - smoking cessation counseling    VTE Risk Mitigation (From admission, onward)        Ordered     heparin 25,000 units in dextrose 5% (100 units/ml) IV bolus from bag - ADDITIONAL PRN BOLUS - 60 units/kg (max bolus 4000 units)  As needed (PRN)      09/03/19 2245     heparin 25,000 units in dextrose 5% (100 units/ml) IV bolus from bag - ADDITIONAL PRN BOLUS - 30 units/kg (max bolus 4000 units)  As needed (PRN)      09/03/19 2245     IP VTE HIGH RISK PATIENT  Once      09/04/19 0207     Place DUGLAS hose  Until discontinued      09/04/19 0207     Place sequential compression device  Until discontinued      09/04/19 0207     heparin 25,000 units in dextrose 5% 250 mL (100 units/mL) infusion LOW INTENSITY nomogram - [facility]  Continuous      09/03/19 2245             Fidel Argueta DO.  Department of Hospital Medicine   [facility]

## 2019-09-04 NOTE — ED PROVIDER NOTES
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  Ms Baron states that she was in her usual state of health until last Saturday 8/31/19 in the morning when she woke up she noticed a "spasm" not pain in her chest that radiated to her neck and her back, associated with nausea and it lasted about 1 hour. She has continued to have the pain since then until today, when she decided to see her PCP as she was concerned it could be her gall bladder. EKG showed ST elevation and she was sent to the ER in [facility]. She received 325 mg ASA on the way there. Troponins iN WB were found to be elevated, and they also performed a CTA where she was found to have a penetrating ulcer in the abdominal aorta. Per cardiology, they could not anticoagulate if there was a plan from vascular surgery to intervene the aorta and she was transferred here for evaluation by vascular surgery. Currently she does not have any symptoms, denies chest pain, SOB, headaches, N/V, abdominal pain, diarrhea, dysuria or hematuria.         Review of patient's allergies indicates:   Allergen Reactions    Codeine     Demerol [meperidine]     Epinephrine      Past Medical History:   Diagnosis Date    Hypertension      Past Surgical History:   Procedure Laterality Date    HYSTERECTOMY      TONSILLECTOMY       History reviewed. No pertinent family history.  Social History     Tobacco Use    Smoking status: Current Every Day Smoker     Packs/day: 0.50    Smokeless tobacco: Never Used   Substance Use Topics    Alcohol use: No    Drug use: No     Review of Systems   Constitutional: Negative for activity change, appetite change, fatigue and fever.   HENT: " Negative for congestion and facial swelling.    Respiratory: Negative for cough, shortness of breath, wheezing and stridor.    Cardiovascular: Negative for chest pain, palpitations and leg swelling.   Gastrointestinal: Negative for abdominal pain, diarrhea, nausea and vomiting.   Endocrine: Negative.    Genitourinary: Negative for dysuria and hematuria.   Musculoskeletal: Negative for back pain.   Skin: Negative.  Negative for rash.   Neurological: Negative for dizziness, syncope, weakness, light-headedness and headaches.   Psychiatric/Behavioral: Negative for agitation.       Physical Exam     Initial Vitals   BP Pulse Resp Temp SpO2   09/03/19 1708 09/03/19 1708 09/03/19 1708 09/03/19 1911 09/03/19 1708   (!) 157/86 93 18 98.5 °F (36.9 °C) 100 %      MAP       --                Physical Exam    Nursing note and vitals reviewed.  Constitutional: She appears well-developed and well-nourished. No distress.   HENT:   Head: Normocephalic and atraumatic.   Nose: Nose normal.   Mouth/Throat: No oropharyngeal exudate.   Eyes: Pupils are equal, round, and reactive to light.   Neck: Normal range of motion. Neck supple. No JVD present.   Cardiovascular: Normal rate, regular rhythm, normal heart sounds and intact distal pulses.   No murmur heard.  Pulmonary/Chest: Breath sounds normal. No respiratory distress. She has no wheezes. She has no rhonchi.   Abdominal: Soft. Bowel sounds are normal. She exhibits no distension. There is tenderness (in epigastrium and RUQ).   Musculoskeletal: Normal range of motion. She exhibits no edema.   Lymphadenopathy:     She has no cervical adenopathy.   Neurological: She is alert and oriented to person, place, and time. She has normal strength. GCS score is 15. GCS eye subscore is 4. GCS verbal subscore is 5. GCS motor subscore is 6.   Skin: Skin is warm. No rash noted.   Psychiatric: She has a normal mood and affect.         ED Course   Procedures  Labs Reviewed   COMPREHENSIVE METABOLIC  PANEL - Abnormal; Notable for the following components:       Result Value    AST 51 (*)     eGFR if non  56 (*)     All other components within normal limits   TROPONIN I - Abnormal; Notable for the following components:    Troponin I 28.103 (*)     All other components within normal limits   CK - Abnormal; Notable for the following components:     (*)     All other components within normal limits   TROPONIN I - Abnormal; Notable for the following components:    Troponin I 27.512 (*)     All other components within normal limits   CBC W/ AUTO DIFFERENTIAL   TSH   PROTIME-INR   APTT   DRUG SCREEN PANEL, URINE EMERGENCY   LULY PROFILE I (SCREEN)     EKG Readings: (Independently Interpreted)   Initial Reading: STEMI.   ST elevation in DII, DII, AVF as well as V2 and V3       Imaging Results          CTA Chest Abdomen Pelvis (Final result)  Result time 09/03/19 18:50:55    Final result by Carlyn Kennedy MD (09/03/19 18:50:55)                 Impression:      Penetrating atherosclerotic ulcer involving the suprarenal abdominal aorta.  Findings may account for acute chest pain.  Moderate atherosclerotic disease.    Mild left basilar atelectasis versus early infiltrate.    Fat containing right pelvic mass.  Findings are concerning for teratoma.      Electronically signed by: Carlyn Kennedy  Date:    09/03/2019  Time:    18:50             Narrative:    EXAMINATION:  CTA OF CHEST ABDOMEN PELVIS WITH    CLINICAL HISTORY:  Chest pain radiating to the back.    TECHNIQUE:  1.25 mm enhanced axial images were obtained from the lung apices through the greater trochanters. Seventy-five mL of Omnipaque 300 was injected.    COMPARISON:  None.    FINDINGS:  In the chest, there is aneurysmal dilatation or dissection of the aorta.  There are no displaced rib fractures. There is no pleural or pericardial effusion.  Mild vascular calcification is seen at the aortic arch.  The heart size is within normal limits.  There is mild left basilar atelectasis versus subtle infiltrate.  There is linear atelectasis in the right middle lobe.  There is bilateral breast prosthesis.    In the abdomen, a penetrating atherosclerotic ulcer is seen to the left of the celiac axis.  There is ectasia of the abdominal aorta.  At the level of the right renal artery, the abdominal aorta measures up to 2.6 cm in maximal width.  There is mild mural thrombus and moderate vascular calcifications.  The celiac axis, SMA, and RAH are all patent.  The spleen, pancreas, right kidney, adrenal glands, and gallbladder are unremarkable.  There is fatty infiltration of the liver.  There is a simple left renal cortical cyst.  There is no pneumoperitoneum or free fluid detected.  There is a retroaortic left renal vein.    In the pelvis, there is no free fluid.  There is a 4.5 x 3.7 cm right pelvic mass measuring -33 Hounsfield units.  The uterus is surgically absent.  There is grade 1 anterolisthesis of L3 on L4 and L4 on L5.  Multilevel degenerative changes present.  Severe degenerative change is seen at L4/L5 with endplate sclerosis, near complete loss of the intervertebral disc space, and vacuum phenomena.                                 Medical Decision Making:   History:   Old Medical Records: I decided to obtain old medical records.  Old Records Summarized: records from clinic visits, records from previous admission(s) and records from another hospital.  Initial Assessment:   75 yo female with HTN and h/o heavy smoking (1/2ppd) [place] [facility] [place] [facility] penetrating ulcer in the abdominal aorta and STEMI. Upon arrival patient is completely asymptomatic, although her troponins are significantly elevated. Likely she had a STEMI on Saturday when she first experienced her symptoms initially. Unable to start anticoagulation until cleared by Vascular Surgery for her penetrating ulcer in descending aorta.Cardiology aware of patient.  Differential  Diagnosis:   - STEMI  - Penetrating ulcer of descending aorta.     Independently Interpreted Test(s):   I have ordered and independently interpreted X-rays - see summary below.  I have ordered and independently interpreted EKG Reading(s) - see summary below  Clinical Tests:   Lab Tests: Reviewed and Ordered  The following lab test(s) were unremarkable: CBC, CMP, Troponin and BNP  Radiological Study: Reviewed and Ordered  Medical Tests: Reviewed and Ordered  ED Management:  Patient in the ED with suspected subacute STEMI and penetrating ulcer of the abdominal aorta. VS are stable and patient is asymptomatic on initial evaluation. Cardiology at bedside.    12:12 AM Reassessed patient as she states she is feeling SOB. Upon arrival she is not distressed, VS stable sats 99%. No crackles/wheezes on physical exam. She believes it is from anxiety from her current illness.                 Attending Attestation:   Physician Attestation Statement for Resident:  As the supervising MD   Physician Attestation Statement: I have personally seen and examined this patient.   I agree with the above history. -:   As the supervising MD I agree with the above PE.    As the supervising MD I agree with the above treatment, course, plan, and disposition.   -: Pt evaluated by [facility] S[facility] service who do not plan intervention for aortic ulceration at this time, they defer to Cardiology for management. Cardiology service evaluated pt and believe STEMI is subacute, not candidate for primary PCI, and recommend tx with ticagrelor and heparin gtt with planned catheterization in near future.   I have reviewed and agree with the residents interpretation of the following: lab data and CT scans.  I have reviewed the following: old records at this facility and records from a referring facility.                       Clinical Impression:       ICD-10-CM ICD-9-CM   1. Abnormal CT of the abdomen R93.5 793.6   2. Chest pain, unspecified type R07.9 786.50   3.  Elevated troponin R74.8 790.6   4. Non-ST elevation myocardial infarction (NSTEMI) I21.4 410.70   5. NSTEMI (non-ST elevation myocardial infarction) I21.4 410.70   6. Acute ST elevation myocardial infarction (STEMI), unspecified artery I21.3 410.90   7. STEMI (ST elevation myocardial infarction) I21.3 410.90                                Juan Francisco Keller MD  Resident  09/05/19 0314       Hanna Ho MD  09/09/19 1010

## 2019-09-05 PROBLEM — I51.3 LEFT VENTRICULAR THROMBOSIS: Status: ACTIVE | Noted: 2019-09-05

## 2019-09-05 PROBLEM — I50.43 ACUTE ON CHRONIC COMBINED SYSTOLIC AND DIASTOLIC CHF (CONGESTIVE HEART FAILURE): Status: ACTIVE | Noted: 2019-09-05

## 2019-09-05 LAB
ALBUMIN SERPL BCP-MCNC: 2.9 G/DL (ref 3.5–5.2)
ALP SERPL-CCNC: 86 U/L (ref 55–135)
ALT SERPL W/O P-5'-P-CCNC: 15 U/L (ref 10–44)
ANA SER QL IF: NORMAL
ANION GAP SERPL CALC-SCNC: 9 MMOL/L (ref 8–16)
APTT BLDCRRT: 39.6 SEC (ref 21–32)
APTT BLDCRRT: 44.8 SEC (ref 21–32)
APTT BLDCRRT: 89.8 SEC (ref 21–32)
AST SERPL-CCNC: 23 U/L (ref 10–40)
BASOPHILS # BLD AUTO: 0.05 K/UL (ref 0–0.2)
BASOPHILS NFR BLD: 0.6 % (ref 0–1.9)
BILIRUB SERPL-MCNC: 0.6 MG/DL (ref 0.1–1)
BUN SERPL-MCNC: 13 MG/DL (ref 8–23)
CALCIUM SERPL-MCNC: 8.6 MG/DL (ref 8.7–10.5)
CHLORIDE SERPL-SCNC: 106 MMOL/L (ref 95–110)
CO2 SERPL-SCNC: 23 MMOL/L (ref 23–29)
CREAT SERPL-MCNC: 0.8 MG/DL (ref 0.5–1.4)
DIFFERENTIAL METHOD: ABNORMAL
EOSINOPHIL # BLD AUTO: 0.2 K/UL (ref 0–0.5)
EOSINOPHIL NFR BLD: 2.7 % (ref 0–8)
ERYTHROCYTE [DISTWIDTH] IN BLOOD BY AUTOMATED COUNT: 13.5 % (ref 11.5–14.5)
EST. GFR  (AFRICAN AMERICAN): >60 ML/MIN/1.73 M^2
EST. GFR  (NON AFRICAN AMERICAN): >60 ML/MIN/1.73 M^2
GLUCOSE SERPL-MCNC: 106 MG/DL (ref 70–110)
HCT VFR BLD AUTO: 39.6 % (ref 37–48.5)
HGB BLD-MCNC: 12.5 G/DL (ref 12–16)
IMM GRANULOCYTES # BLD AUTO: 0.05 K/UL (ref 0–0.04)
IMM GRANULOCYTES NFR BLD AUTO: 0.6 % (ref 0–0.5)
LYMPHOCYTES # BLD AUTO: 1.7 K/UL (ref 1–4.8)
LYMPHOCYTES NFR BLD: 21.2 % (ref 18–48)
MAGNESIUM SERPL-MCNC: 2.1 MG/DL (ref 1.6–2.6)
MCH RBC QN AUTO: 28.2 PG (ref 27–31)
MCHC RBC AUTO-ENTMCNC: 31.6 G/DL (ref 32–36)
MCV RBC AUTO: 89 FL (ref 82–98)
MONOCYTES # BLD AUTO: 0.8 K/UL (ref 0.3–1)
MONOCYTES NFR BLD: 9.4 % (ref 4–15)
NEUTROPHILS # BLD AUTO: 5.4 K/UL (ref 1.8–7.7)
NEUTROPHILS NFR BLD: 65.5 % (ref 38–73)
NRBC BLD-RTO: 0 /100 WBC
PLATELET # BLD AUTO: 270 K/UL (ref 150–350)
PMV BLD AUTO: 10.6 FL (ref 9.2–12.9)
POTASSIUM SERPL-SCNC: 3.4 MMOL/L (ref 3.5–5.1)
PROT SERPL-MCNC: 6.3 G/DL (ref 6–8.4)
RBC # BLD AUTO: 4.43 M/UL (ref 4–5.4)
SODIUM SERPL-SCNC: 138 MMOL/L (ref 136–145)
TROPONIN I SERPL DL<=0.01 NG/ML-MCNC: 21.36 NG/ML (ref 0–0.03)
WBC # BLD AUTO: 8.22 K/UL (ref 3.9–12.7)

## 2019-09-05 PROCEDURE — 83735 ASSAY OF MAGNESIUM: CPT

## 2019-09-05 PROCEDURE — 97802 MEDICAL NUTRITION INDIV IN: CPT

## 2019-09-05 PROCEDURE — 93010 ELECTROCARDIOGRAM REPORT: CPT | Mod: ,,, | Performed by: INTERNAL MEDICINE

## 2019-09-05 PROCEDURE — 85730 THROMBOPLASTIN TIME PARTIAL: CPT | Mod: 91

## 2019-09-05 PROCEDURE — 36415 COLL VENOUS BLD VENIPUNCTURE: CPT

## 2019-09-05 PROCEDURE — 20600001 HC STEP DOWN PRIVATE ROOM

## 2019-09-05 PROCEDURE — 84484 ASSAY OF TROPONIN QUANT: CPT

## 2019-09-05 PROCEDURE — 63600175 PHARM REV CODE 636 W HCPCS: Performed by: INTERNAL MEDICINE

## 2019-09-05 PROCEDURE — 25000003 PHARM REV CODE 250: Performed by: HOSPITALIST

## 2019-09-05 PROCEDURE — 80053 COMPREHEN METABOLIC PANEL: CPT

## 2019-09-05 PROCEDURE — 93010 EKG 12-LEAD: ICD-10-PCS | Mod: ,,, | Performed by: INTERNAL MEDICINE

## 2019-09-05 PROCEDURE — 99233 SBSQ HOSP IP/OBS HIGH 50: CPT | Mod: ,,, | Performed by: NURSE PRACTITIONER

## 2019-09-05 PROCEDURE — 93005 ELECTROCARDIOGRAM TRACING: CPT

## 2019-09-05 PROCEDURE — 85025 COMPLETE CBC W/AUTO DIFF WBC: CPT

## 2019-09-05 PROCEDURE — 99233 PR SUBSEQUENT HOSPITAL CARE,LEVL III: ICD-10-PCS | Mod: ,,, | Performed by: NURSE PRACTITIONER

## 2019-09-05 PROCEDURE — 25000003 PHARM REV CODE 250: Performed by: NURSE PRACTITIONER

## 2019-09-05 PROCEDURE — 25000003 PHARM REV CODE 250: Performed by: PHYSICIAN ASSISTANT

## 2019-09-05 RX ORDER — LOSARTAN POTASSIUM 25 MG/1
25 TABLET ORAL DAILY
Status: DISCONTINUED | OUTPATIENT
Start: 2019-09-05 | End: 2019-09-07 | Stop reason: HOSPADM

## 2019-09-05 RX ORDER — CLOPIDOGREL BISULFATE 75 MG/1
75 TABLET ORAL DAILY
Qty: 30 TABLET | Refills: 11 | Status: SHIPPED | OUTPATIENT
Start: 2019-09-05 | End: 2020-04-29 | Stop reason: SDUPTHER

## 2019-09-05 RX ORDER — CLOPIDOGREL BISULFATE 75 MG/1
75 TABLET ORAL DAILY
Status: DISCONTINUED | OUTPATIENT
Start: 2019-09-06 | End: 2019-09-05

## 2019-09-05 RX ORDER — CLOPIDOGREL BISULFATE 75 MG/1
75 TABLET ORAL DAILY
Status: DISCONTINUED | OUTPATIENT
Start: 2019-09-07 | End: 2019-09-07 | Stop reason: HOSPADM

## 2019-09-05 RX ORDER — CLOPIDOGREL 300 MG/1
300 TABLET, FILM COATED ORAL ONCE
Status: COMPLETED | OUTPATIENT
Start: 2019-09-06 | End: 2019-09-06

## 2019-09-05 RX ORDER — POTASSIUM CHLORIDE 750 MG/1
30 CAPSULE, EXTENDED RELEASE ORAL
Status: COMPLETED | OUTPATIENT
Start: 2019-09-05 | End: 2019-09-05

## 2019-09-05 RX ORDER — AMLODIPINE BESYLATE 10 MG/1
5 TABLET ORAL DAILY
COMMUNITY
End: 2020-06-04 | Stop reason: SDUPTHER

## 2019-09-05 RX ORDER — ATORVASTATIN CALCIUM 80 MG/1
80 TABLET, FILM COATED ORAL DAILY
Qty: 90 TABLET | Refills: 3 | Status: SHIPPED | OUTPATIENT
Start: 2019-09-05 | End: 2020-04-29 | Stop reason: SDUPTHER

## 2019-09-05 RX ADMIN — POTASSIUM CHLORIDE 30 MEQ: 750 CAPSULE, EXTENDED RELEASE ORAL at 11:09

## 2019-09-05 RX ADMIN — POTASSIUM CHLORIDE 30 MEQ: 750 CAPSULE, EXTENDED RELEASE ORAL at 08:09

## 2019-09-05 RX ADMIN — AMLODIPINE BESYLATE 10 MG: 10 TABLET ORAL at 08:09

## 2019-09-05 RX ADMIN — METOPROLOL TARTRATE 25 MG: 25 TABLET ORAL at 08:09

## 2019-09-05 RX ADMIN — ATORVASTATIN CALCIUM 80 MG: 20 TABLET, FILM COATED ORAL at 08:09

## 2019-09-05 RX ADMIN — ASPIRIN 81 MG CHEWABLE TABLET 81 MG: 81 TABLET CHEWABLE at 08:09

## 2019-09-05 RX ADMIN — RAMELTEON 8 MG: 8 TABLET ORAL at 08:09

## 2019-09-05 RX ADMIN — TICAGRELOR 90 MG: 90 TABLET ORAL at 08:09

## 2019-09-05 NOTE — PHARMACY MED REC
"  Admission Medication Reconciliation - Pharmacy Consult Note    The home medication history was taken by Meg Ballard.  Based on information gathered and subsequent review by the clinical pharmacist, the items below may need attention.    You may go to "Admission" then "Reconcile Home Medications" tabs to review and/or act upon these items.        No issues noted with the medication reconciliation.      Please address this information as you see fit.  Feel free to contact us if you have any questions or require assistance.    Meg Ballard, PharmD  PGY-2 Pharmacy Resident- Internal Medicine  [id number]            .    .            "

## 2019-09-05 NOTE — ASSESSMENT & PLAN NOTE
-euvolemic on exam  -TTE as noted above  -continue GDMT with losartan and lopressor  -monitor for diuretic need  -entresto not well covered by Insurance  -continue tele monitoring  -cardiac diet   -strict I&Os and daily weights  -outpt follow up with Cardiology at [facility]

## 2019-09-05 NOTE — PROGRESS NOTES
"[facility] Hospital Medicine  Progress Note    Patient Name: Ella Baron  MRN: [id number]  Patient Class: IP- Inpatient   Admission Date: 9/3/2019  Length of Stay: 1 days  Attending Physician: Paula Mijares MD  Primary Care Provider: Verna Moreno MD    [facility] Medicine Team: [facility] J Leila Blum NP    Subjective:     Principal Problem:Acute ST elevation myocardial infarction (STEMI)    HPI:  Ms. Baron is a 74 YOF with PMHx of HTN and  tobacco abuse (0.5 ppd x "years") who presented as a transfer from  [facility] for evaluation of elevated troponin with ST changes on EKG and penetrating ulcer of descending aorta (seen on CTA C/A/P).  Patient reports normal health until she awoke abruptly Saturday morning with nausea and associated "muscle contractions" to anterior upper chest, BL neck, shoulders, and upper back.  She did not vomit, but was "uncomfortable".  She took two ibuprofen which improved her sxs and subsided.  Spasms did not worsen with exertion, and appeared worse with rest.  She denies other associated sxs including SOB, CP, palpitations, abd pain, diarrhea, dizziness.  The sxs returned twice over the next two days, within 20 minutes of eating.  She continued to feel progressively better but attributed symptoms to her gallbladder so she saw her PCP on 9/3. The EKG showed evidence of STEMI so she was sent to ED via EMS.  She denies any personal or family hx of cardiac disease.  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  Of note, she reports a "virus" x 2 weeks ago that kept her out of work for 2 days (2/2 "malaise") which is unusual for her. All past medical, social, and family history reviewed.     ED: AFVSS. EKG shows ARTI to inferior leads and anterior leads.  Repeat EKG shows ARTI to inferior leads and V3.  Trop 28-->27.  Vascular surgery consulted and does not believe sxs 2/2 penetrating atherosclerotic ulcer (in descending aorta). Cardiology consulted and " started on ACS protocol. The patient was admitted to the Hospital Medicine Service for further evaluation and management.     Overview/Hospital Course:  Ms. Baron was transferred from [facility] on 09/03 due to STEMI with incidental finding of 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. She was HDS on arrival and pain free. Troponin trended 28.103 >> 24.498 >> 16.203 >> 21.63. Evaluated by Vascualr Surgery on arrival, per documentation they state small penetrating aortic ulcer on CTA, without stranding or intramural hematoma; calcifications present around DEISY; and findings likely represent a chronic process; it is unlikely the cause of her chest pain, and should not prevent her from undergoing heart cath >> no acute vascular interventions at current. They also recommended BP control <120 and HR control <80, transradial approach for LHC if possible to avoid passing catheters past DEISY and to follow up with vascular surgery in 4-6 weeks after discharge to monitor DEISY. Interventional Cardiology was consulted who noted she is well beyond 12 hour hours from onset of STEMI symptoms and therefore not a candidate for primary PCI. Interventional cardiology recommended GDMT for ACS. If the patient develops signs or symptoms of recurrent myocardial ischemia, then will re-evaluate. The also would like PET stress for risk stratification >>> pending inpatient scheduling. Patient is anxious for DC and may request outpt stress testing, if so ok for DC and can complete outpt per discussion with Interventional Cardiology. Continue GDMT for ACS and CHF with heparin gtt (may discontinue gtt 9/6), losartan, metoprolol, ASA, statin, and plavix (brilinta discontinued due to lack of insurance coverage.    TTE obtained 9/5 noted EF 25% with local segmental WMAs, aneurysmal apex with layered thrombus (moderate protruding layered thrombus present), eccentric LVH, moderate LAE, grade I DD consistent with impaired  "relaxation and elevated LVEDP, normal RV function, mild AR, mild TR, and CVP 3. Patient adamantly refused warfarin initiation to KISHA and pharmD after lengthy discussions, she will only accept DOAC use. She will be initiated on xarelto in AM with loading dose for 21 days followed by daily dosing. Will need outpt TTE in ~3 months to assess reabsorption of clot.    Disposition plans: home after PET stress if result negative and no further intervention warranted. If positive will need to discuss plan of care with Interventional Cardiology. No home needs identified. Will need repeat TTE in 3 months and outpt follow up with Cardiology and Vascular Surgery.     Interval History: Resting in bed, she is very anxious regarding all the information that is being "dumped on me". She reports feeling like "this is the end I am just old and I should just take the medicines and go home, if I die I die". She was updated on plan of care, TTE findings, lab results, medications, and need for stress testing. She is concerned to do a stress test because her sister had a negative reaction, she will let KISHA know if she decides to proceed. She currently denies chest pain, palpitations, or shortness of breath. Denies any acute events or distress overnight.     Review of Systems   Constitutional: Negative for activity change, appetite change, chills, diaphoresis and fatigue.   HENT: Negative for congestion, sinus pressure, sinus pain, sneezing, sore throat and trouble swallowing.    Respiratory: Negative for cough, chest tightness, shortness of breath and wheezing.    Cardiovascular: Negative for chest pain, palpitations and leg swelling.   Gastrointestinal: Negative for abdominal distention, abdominal pain, constipation, nausea and vomiting.   Genitourinary: Negative for decreased urine volume, difficulty urinating and urgency.   Musculoskeletal: Negative for arthralgias, back pain, gait problem, joint swelling and myalgias.   Skin: Negative " for rash and wound.   Neurological: Negative for dizziness, syncope, weakness, light-headedness and headaches.   Psychiatric/Behavioral: The patient is nervous/anxious.      Objective:     Vital Signs (Most Recent):  Temp: 96 °F (35.6 °C) (09/05/19 1116)  Pulse: 76 (09/05/19 1118)  Resp: 18 (09/05/19 1116)  BP: (!) 97/56 (09/05/19 1116)  SpO2: (!) 94 % (09/05/19 1116) Vital Signs (24h Range):  Temp:  [96 °F (35.6 °C)-99.7 °F (37.6 °C)] 96 °F (35.6 °C)  Pulse:  [70-88] 76  Resp:  [14-20] 18  SpO2:  [90 %-98 %] 94 %  BP: ()/(56-73) 97/56     Weight: 71.6 kg (157 lb 13.6 oz)  Body mass index is 24.72 kg/m².    Intake/Output Summary (Last 24 hours) at 9/5/2019 1449  Last data filed at 9/5/2019 0500  Gross per 24 hour   Intake 240 ml   Output 0 ml   Net 240 ml      Physical Exam   Constitutional: She is oriented to person, place, and time. She appears well-developed and well-nourished. No distress.   HENT:   Head: Normocephalic and atraumatic.   Mouth/Throat: Mucous membranes are normal. Normal dentition.   Eyes: Conjunctivae, EOM and lids are normal. No scleral icterus.   Neck: Normal range of motion. Neck supple. No JVD present.   Cardiovascular: Normal rate, regular rhythm, normal heart sounds and intact distal pulses.   No murmur heard.  Pulmonary/Chest: Effort normal and breath sounds normal. No respiratory distress. She exhibits no tenderness.   Abdominal: Soft. Bowel sounds are normal. She exhibits no distension. There is no tenderness.   Musculoskeletal: Normal range of motion. She exhibits no edema or deformity.   Neurological: She is alert and oriented to person, place, and time. No cranial nerve deficit.   Skin: Skin is warm and dry. Capillary refill takes 2 to 3 seconds. No rash noted. She is not diaphoretic. No erythema.   Psychiatric: Judgment and thought content normal. Her mood appears anxious.   Nursing note and vitals reviewed.    Significant Labs:   CBC:   Recent Labs   Lab 09/03/19  1705  09/04/19  0349 09/05/19  0353   WBC 10.31 8.94 8.22   HGB 13.3 13.4 12.5   HCT 40.5 39.3 39.6    253 270     CMP:   Recent Labs   Lab 09/03/19  1705 09/04/19  0349 09/05/19  0353    137 138   K 3.6 3.3* 3.4*    105 106   CO2 23 21* 23    110 106   BUN 18 12 13   CREATININE 1.0 0.8 0.8   CALCIUM 9.7 9.0 8.6*   PROT 7.5  --  6.3   ALBUMIN 3.5  --  2.9*   BILITOT 0.5  --  0.6   ALKPHOS 96  --  86   AST 51*  --  23   ALT 27  --  15   ANIONGAP 11 11 9   EGFRNONAA 56* >60.0 >60.0     Lipid Panel:   Recent Labs   Lab 09/04/19  0349   CHOL 168   HDL 46   LDLCALC 98.8   TRIG 116   CHOLHDL 27.4     Magnesium:   Recent Labs   Lab 09/04/19  0349 09/05/19  0353   MG 2.0 2.1     Troponin:   Recent Labs   Lab 09/04/19  0958 09/04/19  1716 09/05/19  0353   TROPONINI 16.203* 22.391* 21.362*     All pertinent labs within the past 24 hours have been reviewed.    Significant Imaging: I have reviewed all pertinent imaging results/findings within the past 24 hours.    Assessment/Plan:      * Acute ST elevation myocardial infarction (STEMI)  -transferred from [facility] on 09/03 due to STEMI with incidental finding of 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  -HDS on arrival and pain free  -troponin trended 28.103 >> 24.498 >> 16.203 >> 21.63  -evaluated by Vascualr Surgery on arrival, per documentation they state small penetrating aortic ulcer on CTA, without stranding or intramural hematoma; calcifications present around DEISY; and findings likely represent a chronic process; it is unlikely the cause of her chest pain, and should not prevent her from undergoing heart cath >> no acute vascular interventions at current   -also recommended BP control <120 and HR control <80   -transradial approach for LHC if possible to avoid passing catheters past DEISY   -follow up with vascular surgery in 4-6 weeks after discharge to monitor DEISY  -Interventional Cardiology was consulted who noted she  is well beyond 12 hour hours from onset of STEMI symptoms and therefore not a candidate for primary PCI   -recommended GDMT for ACS   -if the patient develops signs or symptoms of recurrent myocardial ischemia, then will re-evaluate   -would also would like PET stress for risk stratification >>> pending inpatient scheduling   -patient is anxious for DC and may request outpt stress testing, if so ok for DC and can complete outpt per discussion with Interventional Cardiology  -continue GDMT for ACS and CHF with heparin gtt (may discontinue gtt 9/6), losartan, metoprolol, ASA, statin, and plavix (brilinta discontinued due to lack of insurance coverage  -TTE obtained 9/5 noted EF 25% with local segmental WMAs, aneurysmal apex with layered thrombus (moderate protruding layered thrombus present), eccentric LVH, moderate LAE, grade I DD consistent with impaired relaxation and elevated LVEDP, normal RV function, mild AR, mild TR, and CVP 3   -due to thrombus OAC is needed. patient adamantly refused warfarin initiation to KISHA and pharmD after lengthy discussions, she will only accept DOAC use   -will be initiated on xarelto in AM with loading dose for 21 days followed by daily dosing   -will need outpt TTE in ~3 months to assess reabsorption of clot  -lipid panel with cholesterol 168, HDL 46, LDL 99, and triglycerides 116  -continue tele monitoring  -EKG and SL NTG PRN chest pain  -cardiac diet in house  -disposition planning home after PET stress if result negative and no further intervention warranted   -if positive will need to discuss plan of care with Interventional Cardiology. No home needs identified  -will need repeat TTE in 3 months and outpt follow up with Cardiology and Vascular Surgery    Penetrating atherosclerotic ulcer of aorta  -see above     Acute on chronic combined systolic and diastolic CHF (congestive heart failure)  -euvolemic on exam  -TTE as noted above  -continue GDMT with losartan and  lopressor  -monitor for diuretic need  -entresto not well covered by Insurance  -continue tele monitoring  -cardiac diet   -strict I&Os and daily weights  -outpt follow up with Cardiology at [facility]     Left ventricular thrombosis  -see above     Essential hypertension  -BP well controlled, bordering low  -continue losartan and lopressor  -amlodipine discontinued to allow GDMT therapy initiation  -monitor     Tobacco abuse  -advised smoking cessation  -smoking cessation counseling provided       VTE Risk Mitigation (From admission, onward)        Ordered     rivaroxaban tablet 20 mg  With dinner      09/05/19 1357     rivaroxaban tablet 15 mg  2 times daily with meals      09/05/19 1357     IP VTE HIGH RISK PATIENT  Once      09/04/19 1905     heparin 25,000 units in dextrose 5% (100 units/ml) IV bolus from bag - ADDITIONAL PRN BOLUS - 60 units/kg (max bolus 4000 units)  As needed (PRN)      09/03/19 2245     heparin 25,000 units in dextrose 5% (100 units/ml) IV bolus from bag - ADDITIONAL PRN BOLUS - 30 units/kg (max bolus 4000 units)  As needed (PRN)      09/03/19 2245     Place sequential compression device  Until discontinued      09/04/19 0207     heparin 25,000 units in dextrose 5% 250 mL (100 units/mL) infusion LOW INTENSITY nomogram - [facility]  Continuous      09/03/19 2245          Leila Blum, LEAH, AG-ACNP, BC  Department of Hospital Medicine  [facility]  Pager [phone]  [facility] [id number]

## 2019-09-05 NOTE — PROGRESS NOTES
"EKG showing "STEMI". Patient denies chest pain at this time/SOB. Orders for C today. IMJ paged x2. Awaiting response.  "

## 2019-09-05 NOTE — ASSESSMENT & PLAN NOTE
-transferred from [facility] on 09/03 due to STEMI with incidental finding of 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  -HDS on arrival and pain free  -troponin trended 28.103 >> 24.498 >> 16.203 >> 21.63  -evaluated by Vascualr Surgery on arrival, per documentation they state small penetrating aortic ulcer on CTA, without stranding or intramural hematoma; calcifications present around DEISY; and findings likely represent a chronic process; it is unlikely the cause of her chest pain, and should not prevent her from undergoing heart cath >> no acute vascular interventions at current   -also recommended BP control <120 and HR control <80   -transradial approach for [facility] if possible to avoid passing catheters past DEISY   -follow up with vascular surgery in 4-6 weeks after discharge to monitor DEISY  -Interventional Cardiology was consulted who noted she is well beyond 12 hour hours from onset of STEMI symptoms and therefore not a candidate for primary PCI   -recommended GDMT for ACS   -if the patient develops signs or symptoms of recurrent myocardial ischemia, then will re-evaluate   -would also would like PET stress for risk stratification >>> pending inpatient scheduling   -patient is anxious for DC and may request outpt stress testing, if so ok for DC and can complete outpt per discussion with Interventional Cardiology  -continue GDMT for ACS and CHF with heparin gtt (may discontinue gtt 9/6), losartan, metoprolol, ASA, statin, and plavix (brilinta discontinued due to lack of insurance coverage  -TTE obtained 9/5 noted EF 25% with local segmental WMAs, aneurysmal apex with layered thrombus (moderate protruding layered thrombus present), eccentric LVH, moderate LAE, grade I DD consistent with impaired relaxation and elevated LVEDP, normal RV function, mild AR, mild TR, and CVP 3   -due to thrombus OAC is needed. patient adamantly refused warfarin initiation to KISHA and pharmD after lengthy  discussions, she will only accept DOAC use   -will be initiated on xarelto in AM with loading dose for 21 days followed by daily dosing   -will need outpt TTE in ~3 months to assess reabsorption of clot  -lipid panel with cholesterol 168, HDL 46, LDL 99, and triglycerides 116  -continue tele monitoring  -EKG and SL NTG PRN chest pain  -cardiac diet in house  -disposition planning home after PET stress if result negative and no further intervention warranted   -if positive will need to discuss plan of care with Interventional Cardiology. No home needs identified  -will need repeat TTE in 3 months and outpt follow up with Cardiology and Vascular Surgery   50y M with IDDM1 BIBA for ep of hypoglycemia relieved with dextrose c/b fall and trauma to head. Pt wo complaints on presentation, with known transient ep of hypoglycemia - pt with follow up outpatient and has been decreasing insulin. CTH negative, Labs WNL, Tox - bETOH -. LOC 2/2 to hypoglycemia with no other underlying concerns.  Recommended outpatient follow with PMD and potential adjustment to home insulin regimen.

## 2019-09-05 NOTE — PLAN OF CARE
Problem: Adult Inpatient Plan of Care  Goal: Plan of Care Review  Outcome: Ongoing (interventions implemented as appropriate)  Pt free from falls and injury during shift. Heparin gtt decreased to 17 units. Aptt 39.6. PET stress test scheduled tomorrow. Pt NPO @ midnight. VSS, pt voiced no complaints. POC updated with pt, will continue to monitor.

## 2019-09-05 NOTE — PLAN OF CARE
Problem: Adult Inpatient Plan of Care  Goal: Plan of Care Review  Outcome: Ongoing (interventions implemented as appropriate)  Patient remains free of falls or injury. Patient denies pain. Continued on heparin drip; drip titrated per nomogram. Interventional cardiology and vascular surgery c/s completed. TTE completed 9/4/2019; EF 25%. Patient NPO since midnight for [facility] in AM. Plan of care reviewed with patient and family.

## 2019-09-05 NOTE — ASSESSMENT & PLAN NOTE
-BP well controlled, bordering low  -continue losartan and lopressor  -amlodipine discontinued to allow GDMT therapy initiation  -monitor

## 2019-09-05 NOTE — CONSULTS
Food & Nutrition  Education    Diet Education: Cardiac  Time Spent: 15 minutes  Learners: Pt    Nutrition Education provided with handouts: Yes    Comments: Pt endorses good appetite and PO intake PTA. Provided and discussed handouts on low Na and cardiac TLC diets. Reviewed sodium restriction, foods high in sodium, and encouraged pt to flavor food with herbs/spices/salt-free seasonings instead of salt. Encouraged pt to limit saturated/trans fat intake and consume more omega-3 fats. Reviewed food sources of these fats. Pt voiced understanding.    All questions and concerns answered. Dietitian's contact information provided.     Follow-Up: Yes    Please Re-consult as needed  Thanks!

## 2019-09-05 NOTE — HOSPITAL COURSE
Ms. Baron was transferred from [facility] on 09/03 due to STEMI with incidental finding of 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. She was HDS on arrival and pain free. Troponin trended 28.103 >> 24.498 >> 16.203 >> 21.63. Evaluated by Vascualr Surgery on arrival, per documentation they state small penetrating aortic ulcer on CTA, without stranding or intramural hematoma; calcifications present around DEISY; and findings likely represent a chronic process; it is unlikely the cause of her chest pain, and should not prevent her from undergoing heart cath >> no acute vascular interventions at current. They also recommended BP control <120 and HR control <80, transradial approach for LHC if possible to avoid passing catheters past DEISY and to follow up with vascular surgery in 4-6 weeks after discharge to monitor DEISY. Interventional Cardiology was consulted who noted she is well beyond 12 hour hours from onset of STEMI symptoms and therefore not a candidate for primary PCI. Interventional cardiology recommended GDMT for ACS. If the patient develops signs or symptoms of recurrent myocardial ischemia, then will re-evaluate. The also would like PET stress for risk stratification >>> pending inpatient scheduling. Patient is anxious for DC and may request outpt stress testing, if so ok for DC and can complete outpt per discussion with Interventional Cardiology. Continue GDMT for ACS and CHF with heparin gtt (may discontinue gtt 9/6), losartan, metoprolol, ASA, statin, and plavix (brilinta discontinued due to lack of insurance coverage.    TTE obtained 9/5 noted EF 25% with local segmental WMAs, aneurysmal apex with layered thrombus (moderate protruding layered thrombus present), eccentric LVH, moderate LAE, grade I DD consistent with impaired relaxation and elevated LVEDP, normal RV function, mild AR, mild TR, and CVP 3. Patient adamantly refused warfarin initiation to KISHA and pharmD after  lengthy discussions, she will only accept DOAC use. She will be initiated on xarelto in AM with loading dose for 21 days followed by daily dosing. PET stress completed and showed a large sized, moderate to severe intensity, resting perfusion abnormality involving the mid to distal anterior, anteroseptal, and septal walls in addition to the entire apex, involving 40 % of LV myocardium  in the distribution of mid LAD territory. No reversible defect, recommend outpatient PET viability scan. Will need outpt TTE in ~3 months to assess reabsorption of clot. Life Vest ordered for discharge.    Disposition plans: home tomorrow after delivery of Life Vest, will need outpatient follow up with Dr. Smith in Interventional Cardiology and outpatient PET viability study. Will need repeat TTE in 3 months and outpt follow up with Cardiology and Vascular Surgery.

## 2019-09-05 NOTE — PLAN OF CARE
09/05/19 1005   Discharge Assessment   Assessment Type Discharge Planning Assessment   Confirmed/corrected address and phone number on facesheet? Yes   Assessment information obtained from? Patient;Medical Record   Expected Length of Stay (days) 2   Communicated expected length of stay with patient/caregiver yes   Prior to hospitilization cognitive status: Alert/Oriented   Prior to hospitalization functional status: Independent   Current cognitive status: Alert/Oriented   Current Functional Status: Independent   Lives With alone   Able to Return to Prior Arrangements yes   Is patient able to care for self after discharge? Yes   Patient's perception of discharge disposition home or selfcare   Readmission Within the Last 30 Days no previous admission in last 30 days   Patient currently being followed by outpatient case management? No   Patient currently receives any other outside agency services? No   Equipment Currently Used at Home none   Do you have any problems affording any of your prescribed medications? TBD   Is the patient taking medications as prescribed? yes   Does the patient have transportation home? Yes   Transportation Anticipated family or friend will provide   Does the patient receive services at the [facility]madin Clinic? No   Discharge Plan B Home   Patient/Family in Agreement with Plan yes   Admitted with STEMI-Aortic Ulcer. Lives alone and is independent in her ADLs. Plan is to DC home. No DC needs identified.  My Health Packet given to pt and CHELSEY written on [place].

## 2019-09-05 NOTE — SUBJECTIVE & OBJECTIVE
"Interval History: Resting in bed, she is very anxious regarding all the information that is being "dumped on me". She reports feeling like "this is the end I am just old and I should just take the medicines and go home, if I die I die". She was updated on plan of care, TTE findings, lab results, medications, and need for stress testing. She is concerned to do a stress test because her sister had a negative reaction, she will let KISHA know if she decides to proceed. She currently denies chest pain, palpitations, or shortness of breath. Denies any acute events or distress overnight.     Review of Systems   Constitutional: Negative for activity change, appetite change, chills, diaphoresis and fatigue.   HENT: Negative for congestion, sinus pressure, sinus pain, sneezing, sore throat and trouble swallowing.    Respiratory: Negative for cough, chest tightness, shortness of breath and wheezing.    Cardiovascular: Negative for chest pain, palpitations and leg swelling.   Gastrointestinal: Negative for abdominal distention, abdominal pain, constipation, nausea and vomiting.   Genitourinary: Negative for decreased urine volume, difficulty urinating and urgency.   Musculoskeletal: Negative for arthralgias, back pain, gait problem, joint swelling and myalgias.   Skin: Negative for rash and wound.   Neurological: Negative for dizziness, syncope, weakness, light-headedness and headaches.   Psychiatric/Behavioral: The patient is nervous/anxious.      Objective:     Vital Signs (Most Recent):  Temp: 96 °F (35.6 °C) (09/05/19 1116)  Pulse: 76 (09/05/19 1118)  Resp: 18 (09/05/19 1116)  BP: (!) 97/56 (09/05/19 1116)  SpO2: (!) 94 % (09/05/19 1116) Vital Signs (24h Range):  Temp:  [96 °F (35.6 °C)-99.7 °F (37.6 °C)] 96 °F (35.6 °C)  Pulse:  [70-88] 76  Resp:  [14-20] 18  SpO2:  [90 %-98 %] 94 %  BP: ()/(56-73) 97/56     Weight: 71.6 kg (157 lb 13.6 oz)  Body mass index is 24.72 kg/m².    Intake/Output Summary (Last 24 hours) at " 9/5/2019 1449  Last data filed at 9/5/2019 0500  Gross per 24 hour   Intake 240 ml   Output 0 ml   Net 240 ml      Physical Exam   Constitutional: She is oriented to person, place, and time. She appears well-developed and well-nourished. No distress.   HENT:   Head: Normocephalic and atraumatic.   Mouth/Throat: Mucous membranes are normal. Normal dentition.   Eyes: Conjunctivae, EOM and lids are normal. No scleral icterus.   Neck: Normal range of motion. Neck supple. No JVD present.   Cardiovascular: Normal rate, regular rhythm, normal heart sounds and intact distal pulses.   No murmur heard.  Pulmonary/Chest: Effort normal and breath sounds normal. No respiratory distress. She exhibits no tenderness.   Abdominal: Soft. Bowel sounds are normal. She exhibits no distension. There is no tenderness.   Musculoskeletal: Normal range of motion. She exhibits no edema or deformity.   Neurological: She is alert and oriented to person, place, and time. No cranial nerve deficit.   Skin: Skin is warm and dry. Capillary refill takes 2 to 3 seconds. No rash noted. She is not diaphoretic. No erythema.   Psychiatric: Judgment and thought content normal. Her mood appears anxious.   Nursing note and vitals reviewed.    Significant Labs:   CBC:   Recent Labs   Lab 09/03/19  1705 09/04/19  0349 09/05/19  0353   WBC 10.31 8.94 8.22   HGB 13.3 13.4 12.5   HCT 40.5 39.3 39.6    253 270     CMP:   Recent Labs   Lab 09/03/19  1705 09/04/19  0349 09/05/19  0353    137 138   K 3.6 3.3* 3.4*    105 106   CO2 23 21* 23    110 106   BUN 18 12 13   CREATININE 1.0 0.8 0.8   CALCIUM 9.7 9.0 8.6*   PROT 7.5  --  6.3   ALBUMIN 3.5  --  2.9*   BILITOT 0.5  --  0.6   ALKPHOS 96  --  86   AST 51*  --  23   ALT 27  --  15   ANIONGAP 11 11 9   EGFRNONAA 56* >60.0 >60.0     Lipid Panel:   Recent Labs   Lab 09/04/19  0349   CHOL 168   HDL 46   LDLCALC 98.8   TRIG 116   CHOLHDL 27.4     Magnesium:   Recent Labs   Lab 09/04/19  0342  09/05/19  0353   MG 2.0 2.1     Troponin:   Recent Labs   Lab 09/04/19  0958 09/04/19  1716 09/05/19  0353   TROPONINI 16.203* 22.391* 21.362*     All pertinent labs within the past 24 hours have been reviewed.    Significant Imaging: I have reviewed all pertinent imaging results/findings within the past 24 hours.

## 2019-09-06 ENCOUNTER — CLINICAL SUPPORT (OUTPATIENT)
Dept: CARDIOLOGY | Facility: CLINIC | Age: 74
DRG: 280 | End: 2019-09-06
Attending: EMERGENCY MEDICINE
Payer: COMMERCIAL

## 2019-09-06 VITALS — HEART RATE: 76 BPM | SYSTOLIC BLOOD PRESSURE: 110 MMHG | DIASTOLIC BLOOD PRESSURE: 61 MMHG

## 2019-09-06 LAB
ALBUMIN SERPL BCP-MCNC: 2.8 G/DL (ref 3.5–5.2)
ALP SERPL-CCNC: 84 U/L (ref 55–135)
ALT SERPL W/O P-5'-P-CCNC: 13 U/L (ref 10–44)
ANION GAP SERPL CALC-SCNC: 7 MMOL/L (ref 8–16)
APTT BLDCRRT: 44.6 SEC (ref 21–32)
AST SERPL-CCNC: 16 U/L (ref 10–40)
BASOPHILS # BLD AUTO: 0.05 K/UL (ref 0–0.2)
BASOPHILS NFR BLD: 0.7 % (ref 0–1.9)
BILIRUB SERPL-MCNC: 0.4 MG/DL (ref 0.1–1)
BUN SERPL-MCNC: 11 MG/DL (ref 8–23)
CALCIUM SERPL-MCNC: 8.7 MG/DL (ref 8.7–10.5)
CFR FLOW - ANTERIOR: 1.44 CC/MIN/G
CFR FLOW - INFERIOR: 1.39 CC/MIN/G
CFR FLOW - LATERAL: 1.39 CC/MIN/G
CFR FLOW - MAX: 2 CC/MIN/G
CFR FLOW - MIN: 0.9 CC/MIN/G
CFR FLOW - SEPTAL: 1.18 CC/MIN/G
CFR FLOW - WHOLE HEART: 1.35 CC/MIN/G
CHLORIDE SERPL-SCNC: 110 MMOL/L (ref 95–110)
CO2 SERPL-SCNC: 22 MMOL/L (ref 23–29)
CREAT SERPL-MCNC: 0.8 MG/DL (ref 0.5–1.4)
CV STRESS BASE HR: 77 BPM
DIASTOLIC BLOOD PRESSURE: 70 MMHG
DIFFERENTIAL METHOD: ABNORMAL
END DIASTOLIC INDEX-HIGH: 170 ML/M2
END SYSTOLIC INDEX-HIGH: 70 ML/M2
EOSINOPHIL # BLD AUTO: 0.3 K/UL (ref 0–0.5)
EOSINOPHIL NFR BLD: 3.8 % (ref 0–8)
ERYTHROCYTE [DISTWIDTH] IN BLOOD BY AUTOMATED COUNT: 13.8 % (ref 11.5–14.5)
EST. GFR  (AFRICAN AMERICAN): >60 ML/MIN/1.73 M^2
EST. GFR  (NON AFRICAN AMERICAN): >60 ML/MIN/1.73 M^2
GLUCOSE SERPL-MCNC: 102 MG/DL (ref 70–110)
HCT VFR BLD AUTO: 38 % (ref 37–48.5)
HGB BLD-MCNC: 11.8 G/DL (ref 12–16)
IMM GRANULOCYTES # BLD AUTO: 0.02 K/UL (ref 0–0.04)
IMM GRANULOCYTES NFR BLD AUTO: 0.3 % (ref 0–0.5)
LYMPHOCYTES # BLD AUTO: 2.2 K/UL (ref 1–4.8)
LYMPHOCYTES NFR BLD: 31.6 % (ref 18–48)
MAGNESIUM SERPL-MCNC: 2.1 MG/DL (ref 1.6–2.6)
MCH RBC QN AUTO: 28.3 PG (ref 27–31)
MCHC RBC AUTO-ENTMCNC: 31.1 G/DL (ref 32–36)
MCV RBC AUTO: 91 FL (ref 82–98)
MONOCYTES # BLD AUTO: 0.6 K/UL (ref 0.3–1)
MONOCYTES NFR BLD: 9.1 % (ref 4–15)
NEUTROPHILS # BLD AUTO: 3.8 K/UL (ref 1.8–7.7)
NEUTROPHILS NFR BLD: 54.5 % (ref 38–73)
NRBC BLD-RTO: 0 /100 WBC
NUC REST DIASTOLIC VOLUME INDEX: 141
NUC REST EJECTION FRACTION: 42
NUC REST SYSTOLIC VOLUME INDEX: 82
NUC STRESS DIASTOLIC VOLUME INDEX: 149
NUC STRESS EJECTION FRACTION: 38 %
NUC STRESS SYSTOLIC VOLUME INDEX: 93
OHS CV CPX 85 PERCENT MAX PREDICTED HEART RATE MALE: 120
OHS CV CPX MAX PREDICTED HEART RATE: 141
OHS CV CPX PATIENT IS FEMALE: 1
OHS CV CPX PATIENT IS MALE: 0
OHS CV CPX PEAK DIASTOLIC BLOOD PRESSURE: 67 MMHG
OHS CV CPX PEAK HEAR RATE: 81 BPM
OHS CV CPX PEAK RATE PRESSURE PRODUCT: 9639
OHS CV CPX PEAK SYSTOLIC BLOOD PRESSURE: 119 MMHG
OHS CV CPX PERCENT MAX PREDICTED HEART RATE ACHIEVED: 57
OHS CV CPX RATE PRESSURE PRODUCT PRESENTING: 9702
PERFUSION DEFECT 1 SIZE IN %: 40 %
PLATELET # BLD AUTO: 257 K/UL (ref 150–350)
PMV BLD AUTO: 10.8 FL (ref 9.2–12.9)
POTASSIUM SERPL-SCNC: 4 MMOL/L (ref 3.5–5.1)
PROT SERPL-MCNC: 6.2 G/DL (ref 6–8.4)
RBC # BLD AUTO: 4.17 M/UL (ref 4–5.4)
REST FLOW - ANTERIOR: 0.56 CC/MIN/G
REST FLOW - INFERIOR: 1.01 CC/MIN/G
REST FLOW - LATERAL: 1.06 CC/MIN/G
REST FLOW - MAX: 1.5 CC/MIN/G
REST FLOW - MIN: 0.3 CC/MIN/G
REST FLOW - SEPTAL: 0.67 CC/MIN/G
REST FLOW - WHOLE HEART: 0.83
RETIRED EF AND QEF - SEE NOTES: 51 %
SODIUM SERPL-SCNC: 139 MMOL/L (ref 136–145)
STRESS ECHO TARGET HR: 124.1 BPM
STRESS FLOW - ANTERIOR: 0.83 CC/MIN/G
STRESS FLOW - INFERIOR: 1.4 CC/MIN/G
STRESS FLOW - LATERAL: 1.48 CC/MIN/G
STRESS FLOW - MAX: 2.2 CC/MIN/G
STRESS FLOW - MIN: 0.3 CC/MIN/G
STRESS FLOW - SEPTAL: 0.81 CC/MIN/G
STRESS FLOW - WHOLE HEART: 1.13 CC/MIN/G
SYSTOLIC BLOOD PRESSURE: 126 MMHG
WBC # BLD AUTO: 6.9 K/UL (ref 3.9–12.7)

## 2019-09-06 PROCEDURE — 20600001 HC STEP DOWN PRIVATE ROOM

## 2019-09-06 PROCEDURE — 85730 THROMBOPLASTIN TIME PARTIAL: CPT

## 2019-09-06 PROCEDURE — 83735 ASSAY OF MAGNESIUM: CPT

## 2019-09-06 PROCEDURE — 78492 CARDIAC PET SCAN STRESS (CUPID ONLY): ICD-10-PCS | Mod: 26,,, | Performed by: INTERNAL MEDICINE

## 2019-09-06 PROCEDURE — 99999 PR PBB SHADOW E&M-EST. PATIENT-LVL I: ICD-10-PCS | Mod: PBBFAC,,,

## 2019-09-06 PROCEDURE — 25000003 PHARM REV CODE 250: Performed by: NURSE PRACTITIONER

## 2019-09-06 PROCEDURE — 78492 MYOCRD IMG PET MLT RST&STRS: CPT

## 2019-09-06 PROCEDURE — 93016 CARDIAC PET SCAN STRESS (CUPID ONLY): ICD-10-PCS | Mod: ,,, | Performed by: INTERNAL MEDICINE

## 2019-09-06 PROCEDURE — 80053 COMPREHEN METABOLIC PANEL: CPT

## 2019-09-06 PROCEDURE — 99233 SBSQ HOSP IP/OBS HIGH 50: CPT | Mod: ,,, | Performed by: PHYSICIAN ASSISTANT

## 2019-09-06 PROCEDURE — 93018 CARDIAC PET SCAN STRESS (CUPID ONLY): ICD-10-PCS | Mod: ,,, | Performed by: INTERNAL MEDICINE

## 2019-09-06 PROCEDURE — 93018 CV STRESS TEST I&R ONLY: CPT | Mod: ,,, | Performed by: INTERNAL MEDICINE

## 2019-09-06 PROCEDURE — 25000003 PHARM REV CODE 250: Performed by: HOSPITALIST

## 2019-09-06 PROCEDURE — 99233 PR SUBSEQUENT HOSPITAL CARE,LEVL III: ICD-10-PCS | Mod: ,,, | Performed by: PHYSICIAN ASSISTANT

## 2019-09-06 PROCEDURE — 93016 CV STRESS TEST SUPVJ ONLY: CPT | Mod: ,,, | Performed by: INTERNAL MEDICINE

## 2019-09-06 PROCEDURE — 99999 PR PBB SHADOW E&M-EST. PATIENT-LVL I: CPT | Mod: PBBFAC,,,

## 2019-09-06 PROCEDURE — 78492 MYOCRD IMG PET MLT RST&STRS: CPT | Mod: 26,,, | Performed by: INTERNAL MEDICINE

## 2019-09-06 PROCEDURE — 85025 COMPLETE CBC W/AUTO DIFF WBC: CPT

## 2019-09-06 RX ORDER — NAPROXEN SODIUM 220 MG/1
81 TABLET, FILM COATED ORAL DAILY
Refills: 0 | COMMUNITY
Start: 2019-09-07 | End: 2020-12-08

## 2019-09-06 RX ORDER — DIPYRIDAMOLE 5 MG/ML
35.38 INJECTION INTRAVENOUS
Status: COMPLETED | OUTPATIENT
Start: 2019-09-06 | End: 2019-09-06

## 2019-09-06 RX ORDER — LOSARTAN POTASSIUM 25 MG/1
25 TABLET ORAL DAILY
Qty: 90 TABLET | Refills: 3 | Status: SHIPPED | OUTPATIENT
Start: 2019-09-07 | End: 2020-04-29 | Stop reason: SDUPTHER

## 2019-09-06 RX ORDER — METOPROLOL TARTRATE 25 MG/1
25 TABLET, FILM COATED ORAL 2 TIMES DAILY
Qty: 60 TABLET | Refills: 11 | Status: SHIPPED | OUTPATIENT
Start: 2019-09-06 | End: 2020-12-08

## 2019-09-06 RX ADMIN — TRAMADOL HYDROCHLORIDE 50 MG: 50 TABLET, FILM COATED ORAL at 09:09

## 2019-09-06 RX ADMIN — METOPROLOL TARTRATE 25 MG: 25 TABLET ORAL at 11:09

## 2019-09-06 RX ADMIN — DIPYRIDAMOLE 35.4 MG: 5 INJECTION INTRAVENOUS at 10:09

## 2019-09-06 RX ADMIN — RIVAROXABAN 15 MG: 15 TABLET, FILM COATED ORAL at 06:09

## 2019-09-06 RX ADMIN — METOPROLOL TARTRATE 25 MG: 25 TABLET ORAL at 09:09

## 2019-09-06 RX ADMIN — LOSARTAN POTASSIUM 25 MG: 25 TABLET, FILM COATED ORAL at 11:09

## 2019-09-06 RX ADMIN — ATORVASTATIN CALCIUM 80 MG: 20 TABLET, FILM COATED ORAL at 11:09

## 2019-09-06 RX ADMIN — ASPIRIN 81 MG CHEWABLE TABLET 81 MG: 81 TABLET CHEWABLE at 11:09

## 2019-09-06 RX ADMIN — TRAMADOL HYDROCHLORIDE 50 MG: 50 TABLET, FILM COATED ORAL at 11:09

## 2019-09-06 RX ADMIN — RIVAROXABAN 15 MG: 15 TABLET, FILM COATED ORAL at 11:09

## 2019-09-06 RX ADMIN — CLOPIDOGREL BISULFATE 300 MG: 300 TABLET, FILM COATED ORAL at 11:09

## 2019-09-06 NOTE — PLAN OF CARE
Problem: Adult Inpatient Plan of Care  Goal: Plan of Care Review  Outcome: Ongoing (interventions implemented as appropriate)  Plan of care review with pt. Pt remaines free of falls and injury. Pt came back from stress test. ECG was uninterpretable due to baseline ST/T wave changes. PET viability considered. All questions and concerns addressed. Will continue to monitor.

## 2019-09-06 NOTE — PHYSICIAN QUERY
PT Name: Ella Baron  MR #: [id number]     PHYSICIAN QUERY -  ELECTROLYTE CLARIFICATION      CDS/: Maegan Silva RN              Contact information:[phone]  This form is a permanent document in the medical record.     Query Date: September 6, 2019    By submitting this query, we are merely seeking further clarification of documentation to reflect the severity of illness of your patient. Please utilize your independent clinical judgment when addressing the question(s) below.    The Medical record reflects the following:     Indicators   Supporting Clinical Findings Location in Medical Record   x Lab Value(s) Potassium 3.3--> 3.4--> 4.0   Lab 9/4, 9/5, 9/6   x Treatment                                 Medication Potassium chloride 30 mEq oral   MAR 9/4., 9/5    Other       Provider, please specify the diagnosis or diagnoses that correspond(s) to the above indicators. Julian all that apply:    [ x  ] Hypokalemia     [   ] Other electrolyte disturbance (please specify): _______     [   ]  Clinically Undetermined       Please document in your progress notes daily for the duration of treatment until resolved, and include in your discharge summary.

## 2019-09-06 NOTE — PLAN OF CARE
Problem: Adult Inpatient Plan of Care  Goal: Absence of Hospital-Acquired Illness or Injury    Intervention: Prevent Skin Injury  Assessed skin at regular intervals. Warm, dry, intact. No redness. Encourage frequent weight shift.   Intervention: Prevent Infection  Identified potential sources of infection. Assessed skin and mucous membrane integrity.      Goal: Optimal Comfort and Wellbeing  Outcome: Ongoing (interventions implemented as appropriate)  Intervention: Monitor Pain and Promote Comfort  Pt reported shoulder pain from stress test. Stated 4/10. After medication 0/10.

## 2019-09-06 NOTE — PROGRESS NOTES
Patient returned from stress test and requesting diet order. ANABELLE Miranda notified. Verbal order given for cardiac diet. Patient and family notified. Will continue to monitor.

## 2019-09-06 NOTE — ASSESSMENT & PLAN NOTE
-euvolemic on exam  -TTE as noted above  -continue GDMT with losartan and lopressor  -monitor for diuretic need  -entresto not well covered by Insurance  -continue tele monitoring  -cardiac diet   -strict I&Os and daily weights  -outpt follow up with Cardiology at [place]  - Life  Vest ordered, plan for delivery tomorrow

## 2019-09-06 NOTE — PLAN OF CARE
Problem: Adult Inpatient Plan of Care  Goal: Plan of Care Review  Outcome: Ongoing (interventions implemented as appropriate)  Patient remains free of falls or injury. Patient denies pain. Continued on heparin drip; drip titrated per nomogram. Interventional cardiology and vascular surgery c/s completed. TTE completed 9/4/2019; EF 25%. Patient NPO since midnight for stress test in AM. Possible d/c home if stress test (-). Plan of care reviewed with patient and family.

## 2019-09-06 NOTE — SUBJECTIVE & OBJECTIVE
Interval History: No events overnight. Discussed PET results with patient, daughter, and grandchildren at bedside. Discussed need for AC given thrombus, still adamantly refusing Coumadin. Pharmacist at bedside as well. Patient agreeable to lifestyle changes and LifeVest. Patient to discharge tomorrow after delivery of life vest and medications. Patient has a lot of questions about etiology of her MI and disease processes, answered as best of my abilities.    Review of Systems   Constitutional: Negative for chills and fever.   Respiratory: Negative for chest tightness and shortness of breath.    Cardiovascular: Negative for chest pain and leg swelling.   Gastrointestinal: Negative for abdominal pain and nausea.   Neurological: Negative for dizziness and weakness.     Objective:     Vital Signs (Most Recent):  Temp: 98.4 °F (36.9 °C) (09/06/19 1639)  Pulse: 70 (09/06/19 1639)  Resp: 20 (09/06/19 1639)  BP: (!) 104/59 (09/06/19 1639)  SpO2: 96 % (09/06/19 1639) Vital Signs (24h Range):  Temp:  [98.4 °F (36.9 °C)-99.1 °F (37.3 °C)] 98.4 °F (36.9 °C)  Pulse:  [64-82] 70  Resp:  [14-20] 20  SpO2:  [92 %-96 %] 96 %  BP: (104-115)/(58-61) 104/59     Weight: 63.2 kg (139 lb 5.3 oz)  Body mass index is 21.82 kg/m².  No intake or output data in the 24 hours ending 09/06/19 1702   Physical Exam   Constitutional: She is oriented to person, place, and time. She appears well-developed and well-nourished.   Eyes: Pupils are equal, round, and reactive to light. EOM are normal.   Cardiovascular: Normal rate and regular rhythm.   Pulmonary/Chest: Effort normal and breath sounds normal.   Abdominal: Soft. There is no tenderness.   Musculoskeletal: She exhibits no edema or tenderness.   Neurological: She is alert and oriented to person, place, and time.   Skin: Skin is warm and dry.   Psychiatric: She has a normal mood and affect. Her behavior is normal.   Nursing note and vitals reviewed.      Significant Labs:   BMP:   Recent Labs    Lab 09/06/19  0658         K 4.0      CO2 22*   BUN 11   CREATININE 0.8   CALCIUM 8.7   MG 2.1     CBC:   Recent Labs   Lab 09/05/19  0353 09/06/19  0658   WBC 8.22 6.90   HGB 12.5 11.8*   HCT 39.6 38.0    257     All pertinent labs within the past 24 hours have been reviewed.    Significant Imaging: I have reviewed all pertinent imaging results/findings within the past 24 hours.

## 2019-09-06 NOTE — PROGRESS NOTES
"[facility] Hospital Medicine  Progress Note    Patient Name: Ella Baron  MRN: [id number]  Patient Class: IP- Inpatient   Admission Date: 9/3/2019  Length of Stay: 2 days  Attending Physician: Paula Mijares MD  Primary Care Provider: Verna Moreno MD    [facility] Medicine Team: [facility] J Kaden Yu PA-C    Subjective:     Principal Problem:Acute ST elevation myocardial infarction (STEMI)        HPI:  Ms. Baron is a 74 YOF with PMHx of HTN and  tobacco abuse (0.5 ppd x "years") who presented as a transfer from  [facility] for evaluation of elevated troponin with ST changes on EKG and penetrating ulcer of descending aorta (seen on CTA C/A/P).  Patient reports normal health until she awoke abruptly Saturday morning with nausea and associated "muscle contractions" to anterior upper chest, BL neck, shoulders, and upper back.  She did not vomit, but was "uncomfortable".  She took two ibuprofen which improved her sxs and subsided.  Spasms did not worsen with exertion, and appeared worse with rest.  She denies other associated sxs including SOB, CP, palpitations, abd pain, diarrhea, dizziness.  The sxs returned twice over the next two days, within 20 minutes of eating.  She continued to feel progressively better but attributed symptoms to her gallbladder so she saw her PCP on 9/3. The EKG showed evidence of STEMI so she was sent to ED via EMS.  She denies any personal or family hx of cardiac disease.  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  Of note, she reports a "virus" x 2 weeks ago that kept her out of work for 2 days (2/2 "malaise") which is unusual for her. All past medical, social, and family history reviewed.     ED: AFVSS. EKG shows ARTI to inferior leads and anterior leads.  Repeat EKG shows ARTI to inferior leads and V3.  Trop 28-->27.  Vascular surgery consulted and does not believe sxs 2/2 penetrating atherosclerotic ulcer (in descending aorta). Cardiology consulted " and started on ACS protocol. The patient was admitted to the Hospital Medicine Service for further evaluation and management.       Overview/Hospital Course:  Ms. Baron was transferred from [facility] on 09/[facility] due to STEMI with incidental finding of 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. She was HDS on arrival and pain free. Troponin trended 28.103 >> 24.498 >> 16.203 >> 21.63. Evaluated by Vascualr Surgery on arrival, per documentation they state small penetrating aortic ulcer on CTA, without stranding or intramural hematoma; calcifications present around DEISY; and findings likely represent a chronic process; it is unlikely the cause of her chest pain, and should not prevent her from undergoing heart cath >> no acute vascular interventions at current. They also recommended BP control <120 and HR control <80, transradial approach for LHC if possible to avoid passing catheters past DEISY and to follow up with vascular surgery in 4-6 weeks after discharge to monitor DEISY. Interventional Cardiology was consulted who noted she is well beyond 12 hour hours from onset of STEMI symptoms and therefore not a candidate for primary PCI. Interventional cardiology recommended GDMT for ACS. If the patient develops signs or symptoms of recurrent myocardial ischemia, then will re-evaluate. The also would like PET stress for risk stratification >>> pending inpatient scheduling. Patient is anxious for DC and may request outpt stress testing, if so ok for DC and can complete outpt per discussion with Interventional Cardiology. Continue GDMT for ACS and CHF with heparin gtt (may discontinue gtt 9/6), losartan, metoprolol, ASA, statin, and plavix (brilinta discontinued due to lack of insurance coverage.    TTE obtained 9/5 noted EF 25% with local segmental WMAs, aneurysmal apex with layered thrombus (moderate protruding layered thrombus present), eccentric LVH, moderate LAE, grade I DD consistent with  impaired relaxation and elevated LVEDP, normal RV function, mild AR, mild TR, and CVP 3. Patient adamantly refused warfarin initiation to KISHA and pharmD after lengthy discussions, she will only accept DOAC use. She will be initiated on xarelto in AM with loading dose for 21 days followed by daily dosing. PET stress completed and showed a large sized, moderate to severe intensity, resting perfusion abnormality involving the mid to distal anterior, anteroseptal, and septal walls in addition to the entire apex, involving 40 % of LV myocardium  in the distribution of mid LAD territory. No reversible defect, recommend outpatient PET viability scan. Will need outpt TTE in ~3 months to assess reabsorption of clot. Life Vest ordered for discharge.    Disposition plans: home tomorrow after delivery of Life Vest, will need outpatient follow up with Dr. Smith in Interventional Cardiology and outpatient PET viability study. Will need repeat TTE in 3 months and outpt follow up with Cardiology and Vascular Surgery.     Interval History: No events overnight. Discussed PET results with patient, daughter, and grandchildren at bedside. Discussed need for AC given thrombus, still adamantly refusing Coumadin. Pharmacist at bedside as well. Patient agreeable to lifestyle changes and LifeVest. Patient to discharge tomorrow after delivery of life vest and medications. Patient has a lot of questions about etiology of her MI and disease processes, answered as best of my abilities.    Review of Systems   Constitutional: Negative for chills and fever.   Respiratory: Negative for chest tightness and shortness of breath.    Cardiovascular: Negative for chest pain and leg swelling.   Gastrointestinal: Negative for abdominal pain and nausea.   Neurological: Negative for dizziness and weakness.     Objective:     Vital Signs (Most Recent):  Temp: 98.4 °F (36.9 °C) (09/06/19 1639)  Pulse: 70 (09/06/19 1639)  Resp: 20 (09/06/19 1639)  BP: (!) 104/59  (09/06/19 1639)  SpO2: 96 % (09/06/19 1639) Vital Signs (24h Range):  Temp:  [98.4 °F (36.9 °C)-99.1 °F (37.3 °C)] 98.4 °F (36.9 °C)  Pulse:  [64-82] 70  Resp:  [14-20] 20  SpO2:  [92 %-96 %] 96 %  BP: (104-115)/(58-61) 104/59     Weight: 63.2 kg (139 lb 5.3 oz)  Body mass index is 21.82 kg/m².  No intake or output data in the 24 hours ending 09/06/19 1702   Physical Exam   Constitutional: She is oriented to person, place, and time. She appears well-developed and well-nourished.   Eyes: Pupils are equal, round, and reactive to light. EOM are normal.   Cardiovascular: Normal rate and regular rhythm.   Pulmonary/Chest: Effort normal and breath sounds normal.   Abdominal: Soft. There is no tenderness.   Musculoskeletal: She exhibits no edema or tenderness.   Neurological: She is alert and oriented to person, place, and time.   Skin: Skin is warm and dry.   Psychiatric: She has a normal mood and affect. Her behavior is normal.   Nursing note and vitals reviewed.      Significant Labs:   BMP:   Recent Labs   Lab 09/06/19  0658         K 4.0      CO2 22*   BUN 11   CREATININE 0.8   CALCIUM 8.7   MG 2.1     CBC:   Recent Labs   Lab 09/05/19  0353 09/06/19  0658   WBC 8.22 6.90   HGB 12.5 11.8*   HCT 39.6 38.0    257     All pertinent labs within the past 24 hours have been reviewed.    Significant Imaging: I have reviewed all pertinent imaging results/findings within the past 24 hours.      Assessment/Plan:      * Acute ST elevation myocardial infarction (STEMI)  -transferred from [facility] on 09/03 due to STEMI with incidental finding of 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  -HDS on arrival and pain free  -troponin trended 28.103 >> 24.498 >> 16.203 >> 21.63  -evaluated by Vascualr Surgery on arrival, per documentation they state small penetrating aortic ulcer on CTA, without stranding or intramural hematoma; calcifications present around DEISY; and findings  "likely represent a chronic process; it is unlikely the cause of her chest pain, and should not prevent her from undergoing heart cath >> no acute vascular interventions at current   -also recommended BP control <120 and HR control <80   -transradial approach for LHC if possible to avoid passing catheters past DEISY   -follow up with vascular surgery in 4-6 weeks after discharge to monitor DEISY  -Interventional Cardiology was consulted who noted she is well beyond 12 hour hours from onset of STEMI symptoms and therefore not a candidate for primary PCI   -recommended GDMT for ACS   -if the patient develops signs or symptoms of recurrent myocardial ischemia, then will re-evaluate   -would also would like PET stress for risk stratification >>> pending inpatient scheduling   -patient is anxious for DC and may request outpt stress testing, if so ok for DC and can complete outpt per discussion with Interventional Cardiology  -continue GDMT for ACS and CHF with heparin gtt (may discontinue gtt 9/6), losartan, metoprolol, ASA, statin, and plavix (brilinta discontinued due to lack of insurance coverage  -TTE obtained 9/5 noted EF 25% with local segmental WMAs, aneurysmal apex with layered thrombus (moderate protruding layered thrombus present), eccentric LVH, moderate LAE, grade I DD consistent with impaired relaxation and elevated LVEDP, normal RV function, mild AR, mild TR, and CVP 3   -due to thrombus OAC is needed. patient adamantly refused warfarin initiation to KISHA and pharmD after lengthy discussions, she will only accept DOAC use   -will be initiated on xarelto in AM with loading dose for 21 days followed by daily dosing   -will need outpt TTE in ~3 months to assess reabsorption of clot  -lipid panel with cholesterol 168, HDL 46, LDL 99, and triglycerides 116  -continue tele monitoring  -EKG and SL NTG PRN chest pain  -cardiac diet in house  - PET stress completed "there is a large sized, moderate to severe intensity, " "resting perfusion abnormality involving the mid to distal anterior, anteroseptal, and septal walls in addition to the entire apex, involving 40 % of LV myocardium  in the distribution of mid LAD territory."   - discussed with interventional cardiology, will need PET viability as outpatient (ordered) and follow up with Interventional with Dr. Smith (referral placed) after PET viability  -will need repeat TTE in 3 months and outpt follow up with Cardiology and Vascular Surgery  - discharge pending LifeVest    Acute on chronic combined systolic and diastolic CHF (congestive heart failure)  NYHA class 2 HF with reduced EF    -euvolemic on exam  -TTE as noted above  -continue GDMT with losartan and lopressor  -monitor for diuretic need  -entresto not well covered by Insurance  -continue tele monitoring  -cardiac diet   -strict I&Os and daily weights  -outpt follow up with Cardiology at [place]  - Life  Vest ordered, plan for delivery tomorrow     Left ventricular thrombosis  -see above     Essential hypertension  -BP well controlled, bordering low  -continue losartan and lopressor  -amlodipine discontinued to allow GDMT therapy initiation  -monitor     Tobacco abuse  -advised smoking cessation  -smoking cessation counseling provided     Penetrating atherosclerotic ulcer of aorta  -see above     VTE Risk Mitigation (From admission, onward)        Ordered     rivaroxaban tablet 20 mg  With dinner      09/05/19 1357     rivaroxaban tablet 15 mg  2 times daily with meals      09/05/19 1357     IP VTE HIGH RISK PATIENT  Once      09/04/19 1905     Place sequential compression device  Until discontinued      09/04/19 0207                Kaden Yu PA-C  Department of Hospital Medicine   [facility]  "

## 2019-09-06 NOTE — ASSESSMENT & PLAN NOTE
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  -HDS on arrival and pain free  -troponin trended 28.103 >> 24.498 >> 16.203 >> 21.63  -evaluated by Vascualr Surgery on arrival, per documentation they state small penetrating aortic ulcer on CTA, without stranding or intramural hematoma; calcifications present around DEISY; and findings likely represent a chronic process; it is unlikely the cause of her chest pain, and should not prevent her from undergoing heart cath >> no acute vascular interventions at current   -also recommended BP control <120 and HR control <80   -transradial approach for [facility] if possible to avoid passing catheters past DEISY   -follow up with vascular surgery in 4-6 weeks after discharge to monitor DEISY  -Interventional Cardiology was consulted who noted she is well beyond 12 hour hours from onset of STEMI symptoms and therefore not a candidate for primary PCI   -recommended GDMT for ACS   -if the patient develops signs or symptoms of recurrent myocardial ischemia, then will re-evaluate   -would also would like PET stress for risk stratification >>> pending inpatient scheduling   -patient is anxious for DC and may request outpt stress testing, if so ok for DC and can complete outpt per discussion with Interventional Cardiology  -continue GDMT for ACS and CHF with heparin gtt (may discontinue gtt 9/6), losartan, metoprolol, ASA, statin, and plavix (brilinta discontinued due to lack of insurance coverage  -TTE obtained 9/5 noted EF 25% with local segmental WMAs, aneurysmal apex with layered thrombus (moderate protruding layered thrombus present), eccentric LVH, moderate LAE, grade I DD consistent with impaired relaxation and elevated LVEDP, normal RV function, mild AR, mild TR, and CVP 3   -due to thrombus OAC is needed. patient adamantly refused warfarin initiation to KISHA and pharmD after lengthy  "discussions, she will only accept DOAC use   -will be initiated on xarelto in AM with loading dose for 21 days followed by daily dosing   -will need outpt TTE in ~3 months to assess reabsorption of clot  -lipid panel with cholesterol 168, HDL 46, LDL 99, and triglycerides 116  -continue tele monitoring  -EKG and SL NTG PRN chest pain  -cardiac diet in house  - PET stress completed "there is a large sized, moderate to severe intensity, resting perfusion abnormality involving the mid to distal anterior, anteroseptal, and septal walls in addition to the entire apex, involving 40 % of LV myocardium  in the distribution of mid LAD territory."   - discussed with interventional cardiology, will need PET viability as outpatient (ordered) and follow up with Interventional with Dr. Smith (referral placed) after PET viability  -will need repeat TTE in 3 months and outpt follow up with Cardiology and Vascular Surgery  - discharge pending LifeVest  "

## 2019-09-06 NOTE — PROGRESS NOTES
Patient noted to have brief runs of bradycardia on telemetry. Lowest noted HR 38. HR returns to 70s immediately. Patient sleeping. Dr. Buckley notified; no orders given at this time. Will continue to monitor.

## 2019-09-07 VITALS
WEIGHT: 142.19 LBS | HEART RATE: 69 BPM | TEMPERATURE: 98 F | HEIGHT: 67 IN | OXYGEN SATURATION: 93 % | SYSTOLIC BLOOD PRESSURE: 106 MMHG | BODY MASS INDEX: 22.32 KG/M2 | RESPIRATION RATE: 20 BRPM | DIASTOLIC BLOOD PRESSURE: 56 MMHG

## 2019-09-07 PROBLEM — I50.20 NYHA CLASS 2 HEART FAILURE WITH REDUCED EJECTION FRACTION: Status: ACTIVE | Noted: 2019-09-07

## 2019-09-07 LAB
ALBUMIN SERPL BCP-MCNC: 2.9 G/DL (ref 3.5–5.2)
ALP SERPL-CCNC: 85 U/L (ref 55–135)
ALT SERPL W/O P-5'-P-CCNC: 12 U/L (ref 10–44)
ANION GAP SERPL CALC-SCNC: 8 MMOL/L (ref 8–16)
APTT BLDCRRT: 38.4 SEC (ref 21–32)
AST SERPL-CCNC: 19 U/L (ref 10–40)
BASOPHILS # BLD AUTO: 0.05 K/UL (ref 0–0.2)
BASOPHILS NFR BLD: 0.6 % (ref 0–1.9)
BILIRUB SERPL-MCNC: 0.4 MG/DL (ref 0.1–1)
BUN SERPL-MCNC: 16 MG/DL (ref 8–23)
CALCIUM SERPL-MCNC: 8.8 MG/DL (ref 8.7–10.5)
CHLORIDE SERPL-SCNC: 107 MMOL/L (ref 95–110)
CO2 SERPL-SCNC: 22 MMOL/L (ref 23–29)
CREAT SERPL-MCNC: 0.9 MG/DL (ref 0.5–1.4)
DIFFERENTIAL METHOD: ABNORMAL
EOSINOPHIL # BLD AUTO: 0.4 K/UL (ref 0–0.5)
EOSINOPHIL NFR BLD: 4.5 % (ref 0–8)
ERYTHROCYTE [DISTWIDTH] IN BLOOD BY AUTOMATED COUNT: 13.7 % (ref 11.5–14.5)
EST. GFR  (AFRICAN AMERICAN): >60 ML/MIN/1.73 M^2
EST. GFR  (NON AFRICAN AMERICAN): >60 ML/MIN/1.73 M^2
GLUCOSE SERPL-MCNC: 99 MG/DL (ref 70–110)
HCT VFR BLD AUTO: 36.4 % (ref 37–48.5)
HGB BLD-MCNC: 11.4 G/DL (ref 12–16)
IMM GRANULOCYTES # BLD AUTO: 0.03 K/UL (ref 0–0.04)
IMM GRANULOCYTES NFR BLD AUTO: 0.4 % (ref 0–0.5)
LYMPHOCYTES # BLD AUTO: 2.5 K/UL (ref 1–4.8)
LYMPHOCYTES NFR BLD: 31.7 % (ref 18–48)
MAGNESIUM SERPL-MCNC: 2.1 MG/DL (ref 1.6–2.6)
MCH RBC QN AUTO: 28.4 PG (ref 27–31)
MCHC RBC AUTO-ENTMCNC: 31.3 G/DL (ref 32–36)
MCV RBC AUTO: 91 FL (ref 82–98)
MONOCYTES # BLD AUTO: 0.7 K/UL (ref 0.3–1)
MONOCYTES NFR BLD: 8.4 % (ref 4–15)
NEUTROPHILS # BLD AUTO: 4.2 K/UL (ref 1.8–7.7)
NEUTROPHILS NFR BLD: 54.4 % (ref 38–73)
NRBC BLD-RTO: 0 /100 WBC
PLATELET # BLD AUTO: 274 K/UL (ref 150–350)
PMV BLD AUTO: 10.7 FL (ref 9.2–12.9)
POTASSIUM SERPL-SCNC: 4.3 MMOL/L (ref 3.5–5.1)
PROT SERPL-MCNC: 6.2 G/DL (ref 6–8.4)
RBC # BLD AUTO: 4.01 M/UL (ref 4–5.4)
SODIUM SERPL-SCNC: 137 MMOL/L (ref 136–145)
WBC # BLD AUTO: 7.75 K/UL (ref 3.9–12.7)

## 2019-09-07 PROCEDURE — 85730 THROMBOPLASTIN TIME PARTIAL: CPT

## 2019-09-07 PROCEDURE — 25000003 PHARM REV CODE 250: Performed by: HOSPITALIST

## 2019-09-07 PROCEDURE — 80053 COMPREHEN METABOLIC PANEL: CPT

## 2019-09-07 PROCEDURE — 99239 HOSP IP/OBS DSCHRG MGMT >30: CPT | Mod: ,,, | Performed by: PHYSICIAN ASSISTANT

## 2019-09-07 PROCEDURE — 36415 COLL VENOUS BLD VENIPUNCTURE: CPT

## 2019-09-07 PROCEDURE — 25000003 PHARM REV CODE 250: Performed by: NURSE PRACTITIONER

## 2019-09-07 PROCEDURE — 85025 COMPLETE CBC W/AUTO DIFF WBC: CPT

## 2019-09-07 PROCEDURE — 83735 ASSAY OF MAGNESIUM: CPT

## 2019-09-07 PROCEDURE — 99239 PR HOSPITAL DISCHARGE DAY,>30 MIN: ICD-10-PCS | Mod: ,,, | Performed by: PHYSICIAN ASSISTANT

## 2019-09-07 RX ADMIN — ATORVASTATIN CALCIUM 80 MG: 20 TABLET, FILM COATED ORAL at 09:09

## 2019-09-07 RX ADMIN — LOSARTAN POTASSIUM 25 MG: 25 TABLET, FILM COATED ORAL at 09:09

## 2019-09-07 RX ADMIN — ASPIRIN 81 MG CHEWABLE TABLET 81 MG: 81 TABLET CHEWABLE at 09:09

## 2019-09-07 RX ADMIN — METOPROLOL TARTRATE 25 MG: 25 TABLET ORAL at 09:09

## 2019-09-07 RX ADMIN — RIVAROXABAN 15 MG: 15 TABLET, FILM COATED ORAL at 09:09

## 2019-09-07 RX ADMIN — CLOPIDOGREL BISULFATE 75 MG: 75 TABLET ORAL at 09:09

## 2019-09-07 NOTE — PLAN OF CARE
09/07/2019      Ella Baron  [address] [address]          [facility] Medicine Dept.  [facility] [address] [address]  [phone] after hours  [phone] fax Ella Baron has been hospitalized at the [facility] since 9/3/2019.  Please excuse the patient from duties.  Patient may return on 9/9/19 .  No restrictions.     Please contact me if you have any questions.                  __________________________  Kaden Yu PA-C  09/07/2019

## 2019-09-07 NOTE — ASSESSMENT & PLAN NOTE
NYHA class 2 HF with reduced EF    -euvolemic on exam  -TTE as noted above  -continue GDMT with losartan and lopressor  -monitor for diuretic need  -entresto not well covered by Insurance  -continue tele monitoring  -cardiac diet   -strict I&Os and daily weights  -outpt follow up with Cardiology at [place]  - Life  Vest ordered, plan for delivery Monday

## 2019-09-07 NOTE — PLAN OF CARE
Problem: Adult Inpatient Plan of Care  Goal: Plan of Care Review  Outcome: Ongoing (interventions implemented as appropriate)  POC reviewed with patient who verbalized understanding. Questions and concerns addressed. AAOx4. VSS. Remains free from falls and injury. Addresses ongoing goals. Safety precautions maintained, call light within reach. No acute events. No distress noted. See flowsheets for full assessment. Will continue to monitor.

## 2019-09-07 NOTE — PLAN OF CARE
Patient was only taking Norvasc 10mg prior to admission.    Kaden Yu Jr, [facility] Medicine

## 2019-09-08 NOTE — ASSESSMENT & PLAN NOTE
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  -HDS on arrival and pain free  -troponin trended 28.103 >> 24.498 >> 16.203 >> 21.63  -evaluated by Vasc[facility] Surgery on arrival, per documentation they state small penetrating aortic ulcer on CTA, without stranding or intramural hematoma; calcifications present around DEISY; and findings likely represent a chronic process; it is unlikely the cause of her chest pain, and should not prevent her from undergoing heart cath >> no acute vascular interventions at current   -also recommended BP control <120 and HR control <80   -transradial approach for [facility] if possible to avoid passing catheters past DEISY   -follow up with vascular surgery in 4-6 weeks after discharge to monitor DEISY  -Interventional Cardiology was consulted who noted she is well beyond 12 hour hours from onset of STEMI symptoms and therefore not a candidate for primary PCI   -recommended GDMT for ACS   -if the patient develops signs or symptoms of recurrent myocardial ischemia, then will re-evaluate   -would also would like PET stress for risk stratification >>> pending inpatient scheduling   -patient is anxious for DC and may request outpt stress testing, if so ok for DC and can complete outpt per discussion with Interventional Cardiology  -continue GDMT for ACS and CHF with heparin gtt (may discontinue gtt 9/6), losartan, metoprolol, ASA, statin, and plavix (brilinta discontinued due to lack of insurance coverage  -TTE obtained 9/5 noted EF 25% with local segmental WMAs, aneurysmal apex with layered thrombus (moderate protruding layered thrombus present), eccentric LVH, moderate LAE, grade I DD consistent with impaired relaxation and elevated LVEDP, normal RV function, mild AR, mild TR, and CVP 3   -due to thrombus OAC is needed. patient adamantly refused warfarin initiation to KISHA and pharmD after lengthy  "discussions, she will only accept DOAC use   -will be initiated on xarelto in AM with loading dose for 21 days followed by daily dosing   -will need outpt TTE in ~3 months to assess reabsorption of clot  -lipid panel with cholesterol 168, HDL 46, LDL 99, and triglycerides 116  -continue tele monitoring  -EKG and SL NTG PRN chest pain  -cardiac diet in house  - PET stress completed "there is a large sized, moderate to severe intensity, resting perfusion abnormality involving the mid to distal anterior, anteroseptal, and septal walls in addition to the entire apex, involving 40 % of LV myocardium  in the distribution of mid LAD territory."   - discussed with interventional cardiology, will need PET viability as outpatient (ordered) and follow up with Interventional with Dr. Smith (referral placed) after PET viability  -will need repeat TTE in 3 months and outpt follow up with Cardiology and Vascular Surgery  - discharge pending LifeVest, will deliver to home on Monday, patient does not want to stay  "

## 2019-09-08 NOTE — DISCHARGE SUMMARY
"[facility] Hospital Medicine  Discharge Summary      Patient Name: Ella Baron  MRN: [id number]  Admission Date: 9/3/2019  Hospital Length of Stay: 3 days  Discharge Date and Time: 9/7/2019  1:35 PM  Attending Physician: Yulisa att. providers found   Discharging Provider: Kaden Yu PA-C  Primary Care Provider: Verna Moreno MD  [facility] Medicine Team: [facility] MED J Kaden Yu PA-C    HPI:   Ms. Baron is a 74 YOF with PMHx of HTN and  tobacco abuse (0.5 ppd x "years") who [facility]  [facility] for evaluation of elevated troponin with ST changes on EKG and penetrating ulcer of descending aorta (seen on CTA C/A/P).  Patient reports normal health until she awoke abruptly Saturday morning with nausea and associated "muscle contractions" to anterior upper chest, BL neck, shoulders, and upper back.  She did not vomit, but was "uncomfortable".  She took two ibuprofen which improved her sxs and subsided.  Spasms did not worsen with exertion, and appeared worse with rest.  She denies other associated sxs including SOB, CP, palpitations, abd pain, diarrhea, dizziness.  The sxs returned twice over the next two days, within 20 minutes of eating.  She continued to feel progressively better but attributed symptoms to her gallbladder so she saw her PCP on 9/3. The EKG showed evidence of STEMI so she was sent to ED via EMS.  She denies any personal or family hx of cardiac disease.  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  Of note, she reports a "virus" x 2 weeks ago that kept her out of work for 2 days (2/2 "malaise") which is unusual for her. All past medical, social, and family history reviewed.     ED: AFVSS. EKG shows RATI to inferior leads and anterior leads.  Repeat EKG shows ARTI to inferior leads and V3.  Trop 28-->27.  Vascular surgery consulted and does not believe sxs 2/2 penetrating atherosclerotic ulcer (in descending aorta). Cardiology consulted and " started on ACS protocol. The patient was admitted to the Hospital Medicine Service for further evaluation and management.       Procedure(s) (LRB):  Left heart cath (Left)      Hospital Course:   Ms. 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 She was HDS on arrival and pain free. Troponin trended 28.103 >> 24.498 >> 16.203 >> 21.63. Evaluated by Vascualr Surgery on arrival, per documentation they state small penetrating aortic ulcer on CTA, without stranding or intramural hematoma; calcifications present around DEISY; and findings likely represent a chronic process; it is unlikely the cause of her chest pain, and should not prevent her from undergoing heart cath >> no acute vascular interventions at current. They also recommended BP control <120 and HR control <80, transradial approach for LHC if possible to avoid passing catheters past DEISY and to follow up with vascular surgery in 4-6 weeks after discharge to monitor DEISY. Interventional Cardiology was consulted who noted she is well beyond 12 hour hours from onset of STEMI symptoms and therefore not a candidate for primary PCI. Interventional cardiology recommended GDMT for ACS. If the patient develops signs or symptoms of recurrent myocardial ischemia, then will re-evaluate. The also would like PET stress for risk stratification >>> pending inpatient scheduling. Patient is anxious for DC and may request outpt stress testing, if so ok for DC and can complete outpt per discussion with Interventional Cardiology. Continue GDMT for ACS and CHF with heparin gtt (may discontinue gtt 9/6), losartan, metoprolol, ASA, statin, and plavix (brilinta discontinued due to lack of insurance coverage.    TTE obtained 9/5 noted EF 25% with local segmental WMAs, aneurysmal apex with layered thrombus (moderate protruding layered thrombus present), eccentric LVH, moderate  LAE, grade I DD consistent with impaired relaxation and elevated LVEDP, normal RV function, mild AR, mild TR, and CVP 3. Patient adamantly refused warfarin initiation to KISHA and pharmD after lengthy discussions, she will only accept DOAC use. She will be initiated on xarelto in AM with loading dose for 21 days followed by daily dosing. PET stress completed and showed a large sized, moderate to severe intensity, resting perfusion abnormality involving the mid to distal anterior, anteroseptal, and septal walls in addition to the entire apex, involving 40 % of LV myocardium  in the distribution of mid LAD territory. No reversible defect, recommend outpatient PET viability scan. Will need outpt TTE in ~3 months to assess reabsorption of clot. Life Vest ordered for discharge.    Disposition plans: home tomorrow after delivery of Life Vest, will need outpatient follow up with Dr. Smith in Interventional Cardiology and outpatient PET viability study. Will need repeat TTE in 3 months and outpt follow up with Cardiology and Vascular Surgery.      Consults:   Consults (From admission, onward)        Status Ordering Provider     Inpatient consult to Cardiology  Once     Provider:  Mario Jenkins MD    Completed CARROL CONWAY     Inpatient consult to Cardiology  Once     Provider:  (Not yet assigned)    Completed JOE SCHMIDT     Inpatient consult to Interventional Cardiology  Once     Provider:  (Not yet assigned)    Completed ROBBY CAMARGO     Inpatient consult to Registered Dietitian/Nutritionist  Once     Provider:  (Not yet assigned)    Completed ROBBY CAMARGO     Inpatient consult to Vascular Surgery  Once     Provider:  (Not yet assigned)    Completed CRAROL CONWAY     Inpatient consult to Vascular Surgery  Once     Provider:  (Not yet assigned)    Completed JOE SCHMIDT          * Acute ST elevation myocardial infarction (STEMI)  -transferred from [facility] on 09/03 due to STEMI with  incidental finding of 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  -HDS on arrival and pain free  -troponin trended 28.103 >> 24.498 >> 16.203 >> 21.63  -evaluated by Vascualr Surgery on arrival, per documentation they state small penetrating aortic ulcer on CTA, without stranding or intramural hematoma; calcifications present around DEISY; and findings likely represent a chronic process; it is unlikely the cause of her chest pain, and should not prevent her from undergoing heart cath >> no acute vascular interventions at current   -also recommended BP control <120 and HR control <80   -transradial approach for LHC if possible to avoid passing catheters past DEISY   -follow up with vascular surgery in 4-6 weeks after discharge to monitor DEISY  -Interventional Cardiology was consulted who noted she is well beyond 12 hour hours from onset of STEMI symptoms and therefore not a candidate for primary PCI   -recommended GDMT for ACS   -if the patient develops signs or symptoms of recurrent myocardial ischemia, then will re-evaluate   -would also would like PET stress for risk stratification >>> pending inpatient scheduling   -patient is anxious for DC and may request outpt stress testing, if so ok for DC and can complete outpt per discussion with Interventional Cardiology  -continue GDMT for ACS and CHF with heparin gtt (may discontinue gtt 9/6), losartan, metoprolol, ASA, statin, and plavix (brilinta discontinued due to lack of insurance coverage  -TTE obtained 9/5 noted EF 25% with local segmental WMAs, aneurysmal apex with layered thrombus (moderate protruding layered thrombus present), eccentric LVH, moderate LAE, grade I DD consistent with impaired relaxation and elevated LVEDP, normal RV function, mild AR, mild TR, and CVP 3   -due to thrombus OAC is needed. patient adamantly refused warfarin initiation to KISHA and pharmD after lengthy discussions, she will only accept DOAC use   -will be  "initiated on xarelto in AM with loading dose for 21 days followed by daily dosing   -will need outpt TTE in ~3 months to assess reabsorption of clot  -lipid panel with cholesterol 168, HDL 46, LDL 99, and triglycerides 116  -continue tele monitoring  -EKG and SL NTG PRN chest pain  -cardiac diet in house  - PET stress completed "there is a large sized, moderate to severe intensity, resting perfusion abnormality involving the mid to distal anterior, anteroseptal, and septal walls in addition to the entire apex, involving 40 % of LV myocardium  in the distribution of mid LAD territory."   - discussed with interventional cardiology, will need PET viability as outpatient (ordered) and follow up with Interventional with Dr. Smith (referral placed) after PET viability  -will need repeat TTE in 3 months and outpt follow up with Cardiology and Vascular Surgery  - discharge pending LifeVest, will deliver to home on Monday, patient does not want to stay    Acute on chronic combined systolic and diastolic CHF (congestive heart failure)  NYHA class 2 HF with reduced EF    -euvolemic on exam  -TTE as noted above  -continue GDMT with losartan and lopressor  -monitor for diuretic need  -entresto not well covered by Insurance  -continue tele monitoring  -cardiac diet   -strict I&Os and daily weights  -outpt follow up with Cardiology at [place]  - Life  Vest ordered, plan for delivery Monday    Left ventricular thrombosis  -see above     Essential hypertension  -BP well controlled, bordering low  -continue losartan and lopressor  -amlodipine discontinued to allow GDMT therapy initiation  -monitor     Tobacco abuse  -advised smoking cessation  -smoking cessation counseling provided     Penetrating atherosclerotic ulcer of aorta  -see above       Final Active Diagnoses:    Diagnosis Date Noted POA    PRINCIPAL PROBLEM:  Acute ST elevation myocardial infarction (STEMI) [I21.3] 09/04/2019 Yes    Acute on chronic combined systolic and " diastolic CHF (congestive heart failure) [I50.43] 09/05/2019 Yes    NYHA class 2 heart failure with reduced ejection fraction [I50.20] 09/07/2019 Yes    Left ventricular thrombosis [I51.3] 09/05/2019 Yes    Tobacco abuse [Z72.0] 09/04/2019 Yes    Essential hypertension [I10] 09/04/2019 Yes    Penetrating atherosclerotic ulcer of aorta [I70.0] 09/03/2019 Yes      Problems Resolved During this Admission:       Discharged Condition: good    Disposition: Home or Self Care    Follow Up:  Follow-up Information     Atif Ward II, MD. Call in 4 weeks.    Specialty:  Vascular Surgery  Why:  Schedule CTA of thoracic/visceral aorta and clinic appointment  Contact information:  [address]  [facility] [address]  [phone]             Carter Smith MD. Schedule an appointment as soon as possible for a visit in 2 weeks.    Specialty:  INTERVENTIONAL CARDIOLOGY  Why:  for follow up on stress testing and if need for interventional procedure  Contact information:  [address]  [facility] [address]  [phone]             Thomas aelx - Cardiology In 4 weeks.    Specialty:  Cardiology  Why:  to establish care for general cardiology monitoring   Contact information:  [address]  [facility] [address] [phone]  Additional information:  3rd floor           Call [address].    Contact information:  [phone]  Will be out Monday to fit for gali               Patient Instructions:      Ambulatory referral to Cardiology   Referral Priority: Routine Referral Type: Consultation   Referral Reason: Specialty Services Required   Requested Specialty: Cardiology   Number of Visits Requested: 1     Ambulatory Referral to Vascular Surgery   Referral Priority: Routine Referral Type: Consultation   Referral Reason: Specialty Services Required   Requested Specialty: Vascular Surgery   Number of Visits Requested: 1     Ambulatory referral to Smoking Cessation Program   Referral  Priority: Routine Referral Type: Consultation   Referral Reason: Specialty Services Required   Requested Specialty: CTTS   Number of Visits Requested: 1     Ambulatory Referral to Interventional Cardiology   Referral Priority: Routine Referral Type: Consultation   Referral Reason: Specialty Services Required   Referred to Provider: MESFIN LANTIGUA Requested Specialty: INTERVENTIONAL CARDIOLOGY   Number of Visits Requested: 1     Ambulatory referral to Cardiac Rehab   Referral Priority: Routine Referral Type: Consultation   Referral Reason: Specialty Services Required   Requested Specialty: Cardiac Rehabilitation   Number of Visits Requested: 1     Diet Cardiac     Notify your health care provider if you experience any of the following:  severe uncontrolled pain     Notify your health care provider if you experience any of the following:  difficulty breathing or increased cough     Cardiac PET Scan Viability   Standing Status: Future Standing Exp. Date: 09/06/20     Echo Color Flow Doppler? Yes   Standing Status: Future Standing Exp. Date: 09/05/20     Order Specific Question Answer Comments   Color Flow Doppler? Yes      Activity as tolerated       Significant Diagnostic Studies:     CARDIAC PET SCAN STRESS (CUPID ONLY)   Conclusion          There is a fixed perfusion abnormality present, as described below. No reversible defect.    There is a large sized, moderate to severe intensity, resting perfusion abnormality involving the mid to distal anterior, anteroseptal, and septal walls in addition to the entire apex, involving 40 % of LV myocardium  in the distribution of mid LAD territory.    Whole heart absolute myocardial perfusion (cc/min/g) averaged 0.83  at rest (which is normal), 1.13 cc/min/g at stress (which is mildly reduced), and 1.35 cc/min/g CFR (which is moderately reduced).    Gated perfusion images showed an ejection fraction of 42.0 % at rest and 38 % during stress. Normal is greater than  51%.    There is akinesis at rest and stress.    LV cavity size is normal at rest and stress.    The EKG portion of this study is uninterpretable.    There were no arrhythmias during stress.    The patient reported no chest pain during the stress test.    There are no prior studies for comparison.    Consider PET viability, if clinically indicated.          Pending Diagnostic Studies:     None         Medications:  Reconciled Home Medications:      Medication List      START taking these medications    aspirin 81 MG Chew  Take 1 tablet (81 mg total) by mouth once daily.     atorvastatin 80 MG tablet  Commonly known as:  LIPITOR  Take 1 tablet (80 mg total) by mouth once daily.     clopidogrel 75 mg tablet  Commonly known as:  PLAVIX  Take 1 tablet (75 mg total) by mouth once daily.     losartan 25 MG tablet  Commonly known as:  COZAAR  Take 1 tablet (25 mg total) by mouth once daily.     metoprolol tartrate 25 MG tablet  Commonly known as:  LOPRESSOR  Take 1 tablet (25 mg total) by mouth 2 (two) times daily.     * XARELTO 15 mg (42)- 20 mg (9) tablet dose pack  Generic drug:  rivaroxaban  Take 1 tablet (15 mg) by mouth twice daily with food for 21 days followed by 1 tablet (20 mg) by mouth once daily with food     * rivaroxaban 20 mg Tab  Commonly known as:  XARELTO  Start after Xarelto starting packL Take 1 tablet (20 mg total) by mouth daily with dinner or evening meal.         * This list has 2 medication(s) that are the same as other medications prescribed for you. Read the directions carefully, and ask your doctor or other care provider to review them with you.            CONTINUE taking these medications    amLODIPine 10 MG tablet  Commonly known as:  NORVASC  Take 10 mg by mouth once daily.        STOP taking these medications    lisinopril 20 MG tablet  Commonly known as:  PRINIVIL,ZESTRIL     ticagrelor 90 mg tablet  Commonly known as:  BRILINTA            Indwelling Lines/Drains at time of  discharge:   Lines/Drains/Airways          None          Time spent on the discharge of patient: 32 minutes  Patient was seen and examined on the date of discharge and determined to be suitable for discharge.         Kaden Yu PA-C  Department of Hospital Medicine  [facility]

## 2019-09-09 DIAGNOSIS — I21.3 ACUTE ST ELEVATION MYOCARDIAL INFARCTION (STEMI), UNSPECIFIED ARTERY: Primary | ICD-10-CM

## 2019-09-09 NOTE — PLAN OF CARE
09/09/19 0643   Final Note   Assessment Type Final Discharge Note   Anticipated Discharge Disposition Home   Hospital Follow Up  Appt(s) scheduled? No

## 2019-09-10 ENCOUNTER — PATIENT OUTREACH (OUTPATIENT)
Dept: ADMINISTRATIVE | Facility: CLINIC | Age: 74
End: 2019-09-10

## 2019-09-10 NOTE — PATIENT INSTRUCTIONS
Discharge Instructions for Heart Attack  You have had a heart attack. Also known as acute myocardial infarction, or AMI, a heart attack occurs when a vessel supplying the heart with blood suddenly becomes blocked. Follow these guidelines for home care and lifestyle changes.  Home Care  Take your medications exactly as directed. Dont skip doses.  Remember that recovery after a heart attack takes time. Plan to rest for at least 4-8 weeks while you recover. Then return to normal activity when your doctor says its okay.  Ask your doctor about joining a heart rehabilitation program.  Tell your doctor if you are feeling depressed. Feelings of sadness are common after a heart attack, but it is important that you speak to someone if you are feeling overwhelmed by these feelings.  If you are having chest pain, call 911 for an ambulance. Do NOT drive yourself to the hospital.  Ask your family members to learn CPR.  Learn to take your own blood pressure and pulse. Keep a record of your results. Ask your doctor when you should seek emergency medical attention. He or she will tell you which blood pressure reading is dangerous.  Lifestyle Changes  Maintain a healthy weight. Get help to lose any extra pounds.  Cut back on salt.  Limit canned, dried, packaged, and fast foods.  Dont add salt to your food.  Season foods with herbs instead of salt when you cook.  Break the smoking habit. Enroll in a stop-smoking program to improve your chances of success.  Limit fatty foods.  Check your lipid levels regularly. (Your doctor can show you how to do this.)  Build up your activity according to your doctors recommendation.  Ask your doctor when its okay to resume sexual activity.  Tell your doctor about any erectile dysfunction (ED) medication you are taking. Some ED medications are not safe if you take certain heart medications.  Try to manage stress.  Follow-Up  Make a follow-up appointment as directed by our staff.    When to Seek  Medical Attention  Call 911 right away if you have:  Chest pain that is not relieved by medication.  Shortness of breath.  Otherwise, call your doctor immediately if you have:  Lightheadedness, dizziness, or fainting.  Feeling of irregular heartbeat or fast pulse.   © 1236-9063 [employer], [address] [place] [address], [place] [address]. All rights reserved. This information is not intended as a substitute for professional medical care. Always follow your healthcare professional's instructions.

## 2019-09-13 ENCOUNTER — TELEPHONE (OUTPATIENT)
Dept: CARDIAC REHAB | Facility: CLINIC | Age: 74
End: 2019-09-13

## 2019-10-03 ENCOUNTER — DOCUMENTATION ONLY (OUTPATIENT)
Dept: CARDIOLOGY | Facility: CLINIC | Age: 74
End: 2019-10-03

## 2019-10-03 ENCOUNTER — INITIAL CONSULT (OUTPATIENT)
Dept: CARDIOLOGY | Facility: CLINIC | Age: 74
End: 2019-10-03
Payer: COMMERCIAL

## 2019-10-03 VITALS
OXYGEN SATURATION: 96 % | WEIGHT: 157.88 LBS | SYSTOLIC BLOOD PRESSURE: 117 MMHG | HEART RATE: 79 BPM | HEIGHT: 67 IN | BODY MASS INDEX: 24.78 KG/M2 | DIASTOLIC BLOOD PRESSURE: 68 MMHG

## 2019-10-03 DIAGNOSIS — I71.9 PENETRATING ATHEROSCLEROTIC ULCER OF AORTA: ICD-10-CM

## 2019-10-03 DIAGNOSIS — I51.3 LEFT VENTRICULAR THROMBOSIS: ICD-10-CM

## 2019-10-03 DIAGNOSIS — I25.5 ISCHEMIC CARDIOMYOPATHY: ICD-10-CM

## 2019-10-03 DIAGNOSIS — I25.10 CORONARY ARTERY DISEASE INVOLVING NATIVE CORONARY ARTERY OF NATIVE HEART WITHOUT ANGINA PECTORIS: Primary | ICD-10-CM

## 2019-10-03 DIAGNOSIS — Z72.0 TOBACCO ABUSE: ICD-10-CM

## 2019-10-03 PROCEDURE — 99245 OFF/OP CONSLTJ NEW/EST HI 55: CPT | Mod: S$GLB,,, | Performed by: INTERNAL MEDICINE

## 2019-10-03 PROCEDURE — 99245 PR OFFICE CONSULTATION,LEVEL V: ICD-10-PCS | Mod: S$GLB,,, | Performed by: INTERNAL MEDICINE

## 2019-10-03 PROCEDURE — 99999 PR PBB SHADOW E&M-EST. PATIENT-LVL III: CPT | Mod: PBBFAC,,, | Performed by: INTERNAL MEDICINE

## 2019-10-03 PROCEDURE — 99999 PR PBB SHADOW E&M-EST. PATIENT-LVL III: ICD-10-PCS | Mod: PBBFAC,,, | Performed by: INTERNAL MEDICINE

## 2019-10-03 RX ORDER — SODIUM CHLORIDE 9 MG/ML
3 INJECTION, SOLUTION INTRAVENOUS CONTINUOUS
Status: CANCELLED | OUTPATIENT
Start: 2019-10-03 | End: 2019-10-03

## 2019-10-03 NOTE — PROGRESS NOTES
OUTPATIENT CATHETERIZATION INSTRUCTIONS    You have been scheduled for a procedure in the catheterization lab on Friday, October 11, 2019.     Please report to the Cardiology Waiting Area on the Third floor of the hospital and check in at 9 AM.   You will then be taken to the SSCU (Short Stay Cardiac Unit) and prepared for your procedure. Please be aware that this is not the time of your procedure but the time you are to arrive. The procedures are scheduled on an hourly basis; however, emergency cases take precedence over all other cases.       You may not have anything to eat or drink after midnight the night before your test. You may take your regular morning medications with water. If there are any medications that you should not take you will be instructed to hold them that morning. If you are diabetic and on Metformin (Glucophage) do not take it the day before, the day of, and for 2 days after your procedure.      The procedure will take 1-2 hours to perform. After the procedure, you will return to SSCU on the third floor of the hospital. You will need to lie still (or keep your arm still) for the next 4 to 6 hours to minimize bleeding from the puncture site. Your family may remain in the room with you during this time.       You may be able to be discharged home that same afternoon if there is someone to drive you home and there were no complications. If you have one of the balloon, stent, or device procedures you may spend the night in the hospital. Your doctor will determine, based on your progress, the date and time of your discharge. The results of your procedure will be discussed with you before you are discharged. Any further testing or procedures will be scheduled for you either before you leave or you will be called with these appointments.       If you should have any questions, concerns, or need to change the date of your procedure, please call  BRANDIE Brar @ [phone]    Special  Instructions:  Hold Xarelto Tuesday, Wednesday, Thursday before procedure.  Drink plenty of water the day before and after your procedure.     THE ABOVE INSTRUCTIONS WERE GIVEN TO THE PATIENT VERBALLY AND THEY VERBALIZED UNDERSTANDING.  THEY DO NOT REQUIRE ANY SPECIAL NEEDS AND DO NOT HAVE ANY LEARNING BARRIERS.          Directions for Reporting to Cardiology Waiting Area in the Hospital  If you park in the Parking Garage:  Take elevators to the1st floor of the parking garage.  Continue past the gift shop, coffee shop, and piano.  Take a right and go to the gold elevators. (Elevator B)  Take the elevator to the 3rd floor.  Follow the arrow on the sign on the wall that says Cath Lab Registration/EP Lab Registration.  Follow the long hallway all the way around until you come to a big open area.  This is the registration area.  Check in at Reception Desk.    OR    If family is dropping you off:  Have them drop you off at the front of the Hospital under the green overhang.  Enter through the doors and take a right.  Take the E elevators to the 3rd floor Cardiology Waiting Area.  Check in at the Reception Desk in the waiting room.

## 2019-10-03 NOTE — H&P (VIEW-ONLY)
"Subjective:       Patient ID: Ella Baron is a 74 y.o. female.    Chief Complaint: Coronary Artery Disease    HPI   74 year old female with PMH CAD, Ischemic cardiomyopathy (EF 25%, wearing life vest) with LV apical thrombus (on Xarelto), congenital absence of left ulnar artery, penetrating descending aortic ulcer, HTN, former tobacco use here for hospital follow-up of STEMI. The patient was in her usual state of health until labor day long weekend, when she was awakened at 4:00 AM on 8/31/19 with a "cramping" sensation across her chest and back, with paresthesia of both arms. She sat in her recliner, took 2 advil and this sensation subsided within 20-30 minutes. The next day she had the same episode after eating scrambled eggs, also resolved within 20-30 minutes of taking 2 advil. The third and final episode happened the following day, again after food intake, lasting 20-30 minutes. The following day she had no symptoms but saw her PCP who found STEMI on EKG, she was transferred to the ER, found to have a penetrating aortic ulcer, [place] [facility] [place] ulcer was not intervened upon and she was outside of the time frame for angiography; CAD was managed medically. She was found to have a reduced LVEF of 25% with LAD territory WMA and LAD territory fixed defect on PET. She is scheduled for PET viability, follow-up with vascular surgery and Dr. Mederos who will become her primary cardiologist.    Other than the three episodes of chest/back "cramping" mentioned above, the patient has been completely asymptomatic. She denies GANT (NYHA I) and continues to perform all ADLs as before, denies orthopnea or PND but has noticed mild edema which she attributes to norvasc. She denies symptoms of angina, syncope or life vest shocks and has had no claudication symptoms or skin ulceration. She is a former smoker, quit 30 days ago. No family history of CAD or sudden cardiac death. She is on triple therapy with " "ASA, plavix and Xarelto and has not had any bleeding issues.    Review of Systems   Constitutional: Negative.    HENT: Negative.    Eyes: Negative.    Cardiovascular: Negative.    Gastrointestinal: Negative.    Endocrine: Negative.    Musculoskeletal: Positive for back pain.   All other systems reviewed and are negative.      Objective:       Vitals:    10/03/19 1543 10/03/19 1546   BP: 118/68 117/68   BP Location: Left arm Right arm   Patient Position: Sitting Sitting   BP Method: Large (Automatic) Large (Automatic)   Pulse: 77 79   SpO2: 96%    Weight: 71.6 kg (157 lb 13.6 oz)    Height: 5' 6.5" (1.689 m)          Physical Exam   Constitutional: She is oriented to person, place, and time. She appears well-developed and well-nourished.   Wearing life vest   HENT:   Head: Normocephalic and atraumatic.   Eyes: Pupils are equal, round, and reactive to light. EOM are normal.   Neck: Normal range of motion. No JVD present. Carotid bruit is not present.   Cardiovascular: Normal rate, regular rhythm, normal heart sounds and intact distal pulses. Exam reveals no gallop, no S3, no S4, no distant heart sounds and no friction rub.   No murmur heard.  Pulses:       Radial pulses are 2+ on the right side, and 2+ on the left side.        Femoral pulses are 2+ on the right side, and 2+ on the left side.       Dorsalis pedis pulses are 2+ on the right side, and 2+ on the left side.        Posterior tibial pulses are 0 on the right side, and 0 on the left side.   Bilateral PT present by doppler. Right hand Rajiv's normal   Pulmonary/Chest: Effort normal and breath sounds normal. No respiratory distress. She has no wheezes.   Abdominal: Soft. Bowel sounds are normal. She exhibits no distension. There is no tenderness.   Neurological: She is alert and oriented to person, place, and time.   Skin: Skin is warm. She is not diaphoretic. No erythema.   Psychiatric: She has a normal mood and affect. Her behavior is normal.     "   Assessment:       1. Coronary artery disease involving native coronary artery of native heart without angina pectoris    2. Ischemic cardiomyopathy    3. Tobacco abuse    4. Penetrating atherosclerotic ulcer of aorta    5. Left ventricular thrombosis        Plan:       CAD  The patient is free of angina s/p medically treated STEMI with no revascularization (asymptomatic and [place] of [place] [facility] [place]  Continue DAPT with ASA, plavix, high intensity statin, BB (metoprolol) and ARB (losartan)  PET with 40% LV myocardium fixed defect in LAD territory; viability study scheduled  Plan for LHC +/- PCI following viability study - no left radial access due to congenital absence of left ulnar artery  Will schedule cardiac rehab following LHC +/- PCI    1. Cardiac catheterization with probable PCI.   2. Antiplatelets: ASA, plavix  3. Access: Right radial  4. Catheters: Bayron. No LVEDP (thrombus)  5. Pt is a REMY candidate and understands the importance of taking plavix for at least one year, understands that in case of receiving a drug coated stent the failure to comply with dual anti-platelet therapy is likely to result in stent clothing, heart attack and death.   6. The risks, benefits, and alternatives of coronary vascular angiography and possible intervention were discussed with the patient. All questions were answered and informed consent was obtained. I had a detailed discussion with the patient regarding risk of stroke, MI, bleeding access site complications including limb loss, allergy, kidney failure including dialysis and death.  7. The patient understands the risks and benefits and wishes to go ahead with the procedure.  8. All patient's questions were answered    Ischemic cardiomyopathy with LV thrombus  NYHA I, LVEF 25%  Wearing life vest  Continue losartan, metoprolol  Educated on importance of fluid restriction and low sodium diet  Continue Xarelto for a total fo 3 months; we discussed the  alternative of using warfarin due to cost, however considering the requirement for consistent diet and therapeutic monitoring, the patient chose to remain on DOAC    Penetrating aortic ulcer  Scheduled for follow-up with vascular surgery    HTN  118/68  Continue norvasc, metoprolol, losartan    Tobacco abuse  Quit smoking 30 days ago    Chandra Singh MD  PGY-V      I have personally taken the history and examined this patient and agree with the resident's note as stated above.  The patient presented to [facility] with a STEMI.  She did not receive revascularization was transferred to [facility].  However upon arrival to [facility] she was chest pain-free and out of the window for primary PCI.  She was therefore placed on guideline directed medical therapy.  09/04/2019 TTE demonstrated that the patient now has a cardiomyopathy with an EF of 25%.  He is scheduled for a viability scan to determine if coronary revascularization may be of benefit.  I therefore discussed coronary artery disease and the role of coronary revascularization in improving the patient's ejection fraction with the patient and her daughter in detail.  If the viability scan demonstrates a significant zone of salvageable myocardium that will proceed with cardiac catheterization and possible PCI.  In the interim continue guideline directed medical therapy for ischemic cardiomyopathy.  The patient is currently free of anginal symptoms and reports York Heart Association class 1 symptoms.  -the patient was advised to continue wearing her LifeVest  -continue enteric-coated aspirin 81 mg p.o. Q.day  -continue Plavix 75 mg p.o. Q.day  -continue Lopressor 25 mg p.o. B.i.d.  -continue Cozaar 25 mg p.o. Q.day  -the patient is on Xarelto for LV thrombus detected during her STEMI hospital admission.  Will recommend the patient start a PPI as she is currently on triple therapy  -continue high-intensity statin therapy  -recommend the patient start phase 2 cardiac  rehab 2 weeks after her cardiac catheterization if the viability scan demonstrates hibernating myocardium.  If she has no viable myocardium that could benefit from coronary revascularization then recommend the patient start phase 2 cardiac rehab immediately.  All the patient's questions were answered.

## 2019-10-03 NOTE — LETTER
October 6, 2019      Kaden Yu Jr., MALDONADO  [address]  [facility] [address]           [facility]  [address]  [facility] [address]  Phone: [phone]          Patient: Ella Baron   MR Number: [id number]   YOB: 1945   Date of Visit: 10/3/2019       Dear Kaden Yu Jr.:    Thank you for referring Ella Baron to me for evaluation. Attached you will find relevant portions of my assessment and plan of care.    If you have questions, please do not hesitate to call me. I look forward to following Ella Baron along with you.    Sincerely,    Carter Smith MD    Enclosure  CC:  No Recipients    If you would like to receive this communication electronically, please contact [email or web address] or [phone] to request more information on [employer] Link access.    For providers and/or their staff who would like to refer a patient to [facility], please contact us through our one-stop-shop provider referral line, [facility], at [phone].    If you feel you have received this communication in error or would no longer like to receive these types of communications, please e-mail [email or web address]

## 2019-10-03 NOTE — PROGRESS NOTES
"Subjective:       Patient ID: Ella Baron is a 74 y.o. female.    Chief Complaint: Coronary Artery Disease    HPI   74 year old female with PMH CAD, Ischemic cardiomyopathy (EF 25%, wearing life vest) with LV apical thrombus (on Xarelto), congenital absence of left ulnar artery, penetrating descending aortic ulcer, HTN, former tobacco use here for hospital follow-up of STEMI. The patient was in her usual state of health until labor day long weekend, when she was awakened at 4:00 AM on 8/31/19 with a "cramping" sensation across her chest and back, with paresthesia of both arms. She sat in her recliner, took 2 advil and this sensation subsided within 20-30 minutes. The next day she had the same episode after eating scrambled eggs, also resolved within 20-30 minutes of taking 2 advil. The third and final episode happened the following day, again after food intake, lasting 20-30 minutes. The following day she had no symptoms but saw her PCP who found STEMI on EKG, she was transferred to the ER, found to have a penetrating aortic ulcer, [place] [facility] [place] and penetrating aortic ulcer was not intervened upon and she was outside of the time frame for angiography; CAD was managed medically. She was found to have a reduced LVEF of 25% with LAD territory WMA and LAD territory fixed defect on PET. She is scheduled for PET viability, follow-up with vascular surgery and Dr. Mederos who will become her primary cardiologist.    Other than the three episodes of chest/back "cramping" mentioned above, the patient has been completely asymptomatic. She denies GANT (NYHA I) and continues to perform all ADLs as before, denies orthopnea or PND but has noticed mild edema which she attributes to norvasc. She denies symptoms of angina, syncope or life vest shocks and has had no claudication symptoms or skin ulceration. She is a former smoker, quit 30 days ago. No family history of CAD or sudden cardiac death. She is on triple therapy with " "ASA, plavix and Xarelto and has not had any bleeding issues.    Review of Systems   Constitutional: Negative.    HENT: Negative.    Eyes: Negative.    Cardiovascular: Negative.    Gastrointestinal: Negative.    Endocrine: Negative.    Musculoskeletal: Positive for back pain.   All other systems reviewed and are negative.      Objective:       Vitals:    10/03/19 1543 10/03/19 1546   BP: 118/68 117/68   BP Location: Left arm Right arm   Patient Position: Sitting Sitting   BP Method: Large (Automatic) Large (Automatic)   Pulse: 77 79   SpO2: 96%    Weight: 71.6 kg (157 lb 13.6 oz)    Height: 5' 6.5" (1.689 m)          Physical Exam   Constitutional: She is oriented to person, place, and time. She appears well-developed and well-nourished.   Wearing life vest   HENT:   Head: Normocephalic and atraumatic.   Eyes: Pupils are equal, round, and reactive to light. EOM are normal.   Neck: Normal range of motion. No JVD present. Carotid bruit is not present.   Cardiovascular: Normal rate, regular rhythm, normal heart sounds and intact distal pulses. Exam reveals no gallop, no S3, no S4, no distant heart sounds and no friction rub.   No murmur heard.  Pulses:       Radial pulses are 2+ on the right side, and 2+ on the left side.        Femoral pulses are 2+ on the right side, and 2+ on the left side.       Dorsalis pedis pulses are 2+ on the right side, and 2+ on the left side.        Posterior tibial pulses are 0 on the right side, and 0 on the left side.   Bilateral PT present by doppler. Right hand Rajiv's normal   Pulmonary/Chest: Effort normal and breath sounds normal. No respiratory distress. She has no wheezes.   Abdominal: Soft. Bowel sounds are normal. She exhibits no distension. There is no tenderness.   Neurological: She is alert and oriented to person, place, and time.   Skin: Skin is warm. She is not diaphoretic. No erythema.   Psychiatric: She has a normal mood and affect. Her behavior is normal.     "   Assessment:       1. Coronary artery disease involving native coronary artery of native heart without angina pectoris    2. Ischemic cardiomyopathy    3. Tobacco abuse    4. Penetrating atherosclerotic ulcer of aorta    5. Left ventricular thrombosis        Plan:       CAD  The patient is free of angina s/p medically treated STEMI with no revascularization (asymptomatic and out of STEMI window upon arrival to [facility] [place]  Continue DAPT with ASA, plavix, high intensity statin, BB (metoprolol) and ARB (losartan)  PET with 40% LV myocardium fixed defect in LAD territory; viability study scheduled  Plan for LHC +/- PCI following viability study - no left radial access due to congenital absence of left ulnar artery  Will schedule cardiac rehab following LHC +/- PCI    1. Cardiac catheterization with probable PCI.   2. Antiplatelets: ASA, plavix  3. Access: Right radial  4. Catheters: Bayron. No LVEDP (thrombus)  5. Pt is a REMY candidate and understands the importance of taking plavix for at least one year, understands that in case of receiving a drug coated stent the failure to comply with dual anti-platelet therapy is likely to result in stent clothing, heart attack and death.   6. The risks, benefits, and alternatives of coronary vascular angiography and possible intervention were discussed with the patient. All questions were answered and informed consent was obtained. I had a detailed discussion with the patient regarding risk of stroke, MI, bleeding access site complications including limb loss, allergy, kidney failure including dialysis and death.  7. The patient understands the risks and benefits and wishes to go ahead with the procedure.  8. All patient's questions were answered    Ischemic cardiomyopathy with LV thrombus  NYHA I, LVEF 25%  Wearing life vest  Continue losartan, metoprolol  Educated on importance of fluid restriction and low sodium diet  Continue Xarelto for a total fo 3 months; we discussed the  alternative of using warfarin due to cost, however considering the requirement for consistent diet and therapeutic monitoring, the patient chose to remain on DOAC    Penetrating aortic ulcer  Scheduled for follow-up with vascular surgery    HTN  118/68  Continue norvasc, metoprolol, losartan    Tobacco abuse  Quit smoking 30 days ago    Chandra Singh MD  PGY-V      I have personally taken the history and examined this patient and agree with the resident's note as stated above.  The patient presented to [facility] with a STEMI.  She did not receive revascularization was transferred to [facility].  However upon arrival to [facility] she was chest pain-free and out of the window for primary PCI.  She was therefore placed on guideline directed medical therapy.  09/04/2019 TTE demonstrated that the patient now has a cardiomyopathy with an EF of 25%.  He is scheduled for a viability scan to determine if coronary revascularization may be of benefit.  I therefore discussed coronary artery disease and the role of coronary revascularization in improving the patient's ejection fraction with the patient and her daughter in detail.  If the viability scan demonstrates a significant zone of salvageable myocardium that will proceed with cardiac catheterization and possible PCI.  In the interim continue guideline directed medical therapy for ischemic cardiomyopathy.  The patient is currently free of anginal symptoms and reports York Heart Association class 1 symptoms.  -the patient was advised to continue wearing her LifeVest  -continue enteric-coated aspirin 81 mg p.o. Q.day  -continue Plavix 75 mg p.o. Q.day  -continue Lopressor 25 mg p.o. B.i.d.  -continue Cozaar 25 mg p.o. Q.day  -the patient is on Xarelto for LV thrombus detected during her STEMI hospital admission.  Will recommend the patient start a PPI as she is currently on triple therapy  -continue high-intensity statin therapy  -recommend the patient start phase 2 cardiac  rehab 2 weeks after her cardiac catheterization if the viability scan demonstrates hibernating myocardium.  If she has no viable myocardium that could benefit from coronary revascularization then recommend the patient start phase 2 cardiac rehab immediately.  All the patient's questions were answered.

## 2019-10-04 ENCOUNTER — TELEPHONE (OUTPATIENT)
Dept: CARDIOLOGY | Facility: CLINIC | Age: 74
End: 2019-10-04

## 2019-10-04 DIAGNOSIS — I51.3 LEFT VENTRICULAR THROMBOSIS: Primary | ICD-10-CM

## 2019-10-08 ENCOUNTER — CLINICAL SUPPORT (OUTPATIENT)
Dept: CARDIOLOGY | Facility: CLINIC | Age: 74
End: 2019-10-08
Attending: PHYSICIAN ASSISTANT
Payer: COMMERCIAL

## 2019-10-08 VITALS — BODY MASS INDEX: 25.23 KG/M2 | HEIGHT: 66 IN | WEIGHT: 157 LBS

## 2019-10-08 DIAGNOSIS — I21.3 ACUTE ST ELEVATION MYOCARDIAL INFARCTION (STEMI): ICD-10-CM

## 2019-10-08 PROCEDURE — 99999 PR PBB SHADOW E&M-EST. PATIENT-LVL I: ICD-10-PCS | Mod: PBBFAC,,,

## 2019-10-08 PROCEDURE — A9555 RB82 RUBIDIUM: HCPCS | Mod: S$GLB,,, | Performed by: INTERNAL MEDICINE

## 2019-10-08 PROCEDURE — A9555 CARDIAC PET SCAN VIABILITY (CUPID ONLY): ICD-10-PCS | Mod: S$GLB,,, | Performed by: INTERNAL MEDICINE

## 2019-10-08 PROCEDURE — 99999 PR PBB SHADOW E&M-EST. PATIENT-LVL I: CPT | Mod: PBBFAC,,,

## 2019-10-08 PROCEDURE — A9552 CARDIAC PET SCAN VIABILITY (CUPID ONLY): ICD-10-PCS | Mod: S$GLB,,, | Performed by: INTERNAL MEDICINE

## 2019-10-08 PROCEDURE — A9552 F18 FDG: HCPCS | Mod: S$GLB,,, | Performed by: INTERNAL MEDICINE

## 2019-10-09 LAB
NUC REST DIASTOLIC VOLUME INDEX: 138
NUC REST EJECTION FRACTION: 40
NUC REST SYSTOLIC VOLUME INDEX: 82
PERFUSION DEFECT 1 SIZE IN %: 35 %

## 2019-10-10 ENCOUNTER — TELEPHONE (OUTPATIENT)
Dept: CARDIOLOGY | Facility: CLINIC | Age: 74
End: 2019-10-10

## 2019-10-11 ENCOUNTER — HOSPITAL ENCOUNTER (OUTPATIENT)
Facility: HOSPITAL | Age: 74
Discharge: HOME | End: 2019-10-11
Attending: INTERNAL MEDICINE | Admitting: INTERNAL MEDICINE
Payer: COMMERCIAL

## 2019-10-11 VITALS
TEMPERATURE: 97 F | HEART RATE: 72 BPM | SYSTOLIC BLOOD PRESSURE: 107 MMHG | HEIGHT: 66 IN | DIASTOLIC BLOOD PRESSURE: 53 MMHG | RESPIRATION RATE: 18 BRPM | WEIGHT: 142 LBS | OXYGEN SATURATION: 97 % | BODY MASS INDEX: 22.82 KG/M2

## 2019-10-11 DIAGNOSIS — I25.10 CAD (CORONARY ARTERY DISEASE): ICD-10-CM

## 2019-10-11 DIAGNOSIS — I25.10 CORONARY ARTERY DISEASE INVOLVING NATIVE CORONARY ARTERY OF NATIVE HEART WITHOUT ANGINA PECTORIS: ICD-10-CM

## 2019-10-11 DIAGNOSIS — Z98.61 POSTSURGICAL PERCUTANEOUS TRANSLUMINAL CORONARY ANGIOPLASTY STATUS: Primary | ICD-10-CM

## 2019-10-11 LAB
ABO + RH BLD: NORMAL
ANION GAP SERPL CALC-SCNC: 6 MMOL/L (ref 8–16)
BASOPHILS # BLD AUTO: 0.08 K/UL (ref 0–0.2)
BASOPHILS NFR BLD: 1.5 % (ref 0–1.9)
BLD GP AB SCN CELLS X3 SERPL QL: NORMAL
BUN SERPL-MCNC: 20 MG/DL (ref 8–23)
CALCIUM SERPL-MCNC: 9.3 MG/DL (ref 8.7–10.5)
CHLORIDE SERPL-SCNC: 108 MMOL/L (ref 95–110)
CO2 SERPL-SCNC: 25 MMOL/L (ref 23–29)
CREAT SERPL-MCNC: 1.1 MG/DL (ref 0.5–1.4)
DIFFERENTIAL METHOD: NORMAL
EOSINOPHIL # BLD AUTO: 0.4 K/UL (ref 0–0.5)
EOSINOPHIL NFR BLD: 8 % (ref 0–8)
ERYTHROCYTE [DISTWIDTH] IN BLOOD BY AUTOMATED COUNT: 14.3 % (ref 11.5–14.5)
EST. GFR  (AFRICAN AMERICAN): 57.2 ML/MIN/1.73 M^2
EST. GFR  (NON AFRICAN AMERICAN): 49.6 ML/MIN/1.73 M^2
GLUCOSE SERPL-MCNC: 92 MG/DL (ref 70–110)
HCT VFR BLD AUTO: 38.5 % (ref 37–48.5)
HGB BLD-MCNC: 12.4 G/DL (ref 12–16)
IMM GRANULOCYTES # BLD AUTO: 0.02 K/UL (ref 0–0.04)
IMM GRANULOCYTES NFR BLD AUTO: 0.4 % (ref 0–0.5)
LYMPHOCYTES # BLD AUTO: 1.5 K/UL (ref 1–4.8)
LYMPHOCYTES NFR BLD: 28 % (ref 18–48)
MCH RBC QN AUTO: 28.8 PG (ref 27–31)
MCHC RBC AUTO-ENTMCNC: 32.2 G/DL (ref 32–36)
MCV RBC AUTO: 90 FL (ref 82–98)
MONOCYTES # BLD AUTO: 0.5 K/UL (ref 0.3–1)
MONOCYTES NFR BLD: 8.7 % (ref 4–15)
NEUTROPHILS # BLD AUTO: 2.9 K/UL (ref 1.8–7.7)
NEUTROPHILS NFR BLD: 53.4 % (ref 38–73)
NRBC BLD-RTO: 0 /100 WBC
PLATELET # BLD AUTO: 273 K/UL (ref 150–350)
PMV BLD AUTO: 9.9 FL (ref 9.2–12.9)
POTASSIUM SERPL-SCNC: 4.6 MMOL/L (ref 3.5–5.1)
RBC # BLD AUTO: 4.3 M/UL (ref 4–5.4)
SODIUM SERPL-SCNC: 139 MMOL/L (ref 136–145)
WBC # BLD AUTO: 5.5 K/UL (ref 3.9–12.7)

## 2019-10-11 PROCEDURE — 92920 PRQ TRLUML C ANGIOP 1ART&/BR: CPT | Mod: LD | Performed by: INTERNAL MEDICINE

## 2019-10-11 PROCEDURE — 86850 RBC ANTIBODY SCREEN: CPT

## 2019-10-11 PROCEDURE — 25000003 PHARM REV CODE 250: Performed by: INTERNAL MEDICINE

## 2019-10-11 PROCEDURE — 25500020 PHARM REV CODE 255: Performed by: INTERNAL MEDICINE

## 2019-10-11 PROCEDURE — 27201423 OPTIME MED/SURG SUP & DEVICES STERILE SUPPLY: Performed by: INTERNAL MEDICINE

## 2019-10-11 PROCEDURE — 99152 MOD SED SAME PHYS/QHP 5/>YRS: CPT | Performed by: INTERNAL MEDICINE

## 2019-10-11 PROCEDURE — 80048 BASIC METABOLIC PNL TOTAL CA: CPT

## 2019-10-11 PROCEDURE — 99152 MOD SED SAME PHYS/QHP 5/>YRS: CPT | Mod: ,,, | Performed by: INTERNAL MEDICINE

## 2019-10-11 PROCEDURE — C1725 CATH, TRANSLUMIN NON-LASER: HCPCS | Performed by: INTERNAL MEDICINE

## 2019-10-11 PROCEDURE — C1769 GUIDE WIRE: HCPCS | Performed by: INTERNAL MEDICINE

## 2019-10-11 PROCEDURE — 92928 PR STENT: ICD-10-PCS | Mod: LD,,, | Performed by: INTERNAL MEDICINE

## 2019-10-11 PROCEDURE — 85025 COMPLETE CBC W/AUTO DIFF WBC: CPT

## 2019-10-11 PROCEDURE — C1874 STENT, COATED/COV W/DEL SYS: HCPCS | Performed by: INTERNAL MEDICINE

## 2019-10-11 PROCEDURE — 93454 PR CATH PLACE/CORONARY ANGIO, IMG SUPER/INTERP: ICD-10-PCS | Mod: 26,59,, | Performed by: INTERNAL MEDICINE

## 2019-10-11 PROCEDURE — 93454 CORONARY ARTERY ANGIO S&I: CPT | Mod: 59 | Performed by: INTERNAL MEDICINE

## 2019-10-11 PROCEDURE — 93454 CORONARY ARTERY ANGIO S&I: CPT | Mod: 26,59,, | Performed by: INTERNAL MEDICINE

## 2019-10-11 PROCEDURE — C1894 INTRO/SHEATH, NON-LASER: HCPCS | Performed by: INTERNAL MEDICINE

## 2019-10-11 PROCEDURE — C1887 CATHETER, GUIDING: HCPCS | Performed by: INTERNAL MEDICINE

## 2019-10-11 PROCEDURE — C9600 PERC DRUG-EL COR STENT SING: HCPCS | Performed by: INTERNAL MEDICINE

## 2019-10-11 PROCEDURE — 63600175 PHARM REV CODE 636 W HCPCS: Performed by: INTERNAL MEDICINE

## 2019-10-11 PROCEDURE — 99153 MOD SED SAME PHYS/QHP EA: CPT | Performed by: INTERNAL MEDICINE

## 2019-10-11 PROCEDURE — 92928 PRQ TCAT PLMT NTRAC ST 1 LES: CPT | Mod: LD,,, | Performed by: INTERNAL MEDICINE

## 2019-10-11 PROCEDURE — 99152 PR MOD CONSCIOUS SEDATION, SAME PHYS, 5+ YRS, FIRST 15 MIN: ICD-10-PCS | Mod: ,,, | Performed by: INTERNAL MEDICINE

## 2019-10-11 DEVICE — IMPLANTABLE DEVICE
Type: IMPLANTABLE DEVICE | Site: HEART | Status: FUNCTIONAL
Brand: ORSIRO

## 2019-10-11 RX ORDER — HEPARIN SODIUM 200 [USP'U]/100ML
INJECTION, SOLUTION INTRAVENOUS
Status: DISCONTINUED | OUTPATIENT
Start: 2019-10-11 | End: 2019-10-11 | Stop reason: HOSPADM

## 2019-10-11 RX ORDER — SODIUM CHLORIDE 9 MG/ML
INJECTION, SOLUTION INTRAVENOUS CONTINUOUS
Status: ACTIVE | OUTPATIENT
Start: 2019-10-11 | End: 2019-10-11

## 2019-10-11 RX ORDER — LIDOCAINE HYDROCHLORIDE 20 MG/ML
INJECTION, SOLUTION INFILTRATION; PERINEURAL
Status: DISCONTINUED | OUTPATIENT
Start: 2019-10-11 | End: 2019-10-11 | Stop reason: HOSPADM

## 2019-10-11 RX ORDER — DIPHENHYDRAMINE HCL 50 MG
50 CAPSULE ORAL ONCE
Status: COMPLETED | OUTPATIENT
Start: 2019-10-11 | End: 2019-10-11

## 2019-10-11 RX ORDER — SODIUM CHLORIDE 9 MG/ML
3 INJECTION, SOLUTION INTRAVENOUS CONTINUOUS
Status: DISCONTINUED | OUTPATIENT
Start: 2019-10-11 | End: 2019-10-11

## 2019-10-11 RX ORDER — FENTANYL CITRATE 50 UG/ML
INJECTION, SOLUTION INTRAMUSCULAR; INTRAVENOUS
Status: DISCONTINUED | OUTPATIENT
Start: 2019-10-11 | End: 2019-10-11 | Stop reason: HOSPADM

## 2019-10-11 RX ORDER — ACETAMINOPHEN 325 MG/1
650 TABLET ORAL EVERY 4 HOURS PRN
Status: DISCONTINUED | OUTPATIENT
Start: 2019-10-11 | End: 2019-10-11 | Stop reason: HOSPADM

## 2019-10-11 RX ORDER — NITROGLYCERIN 5 MG/ML
INJECTION, SOLUTION INTRAVENOUS
Status: DISCONTINUED | OUTPATIENT
Start: 2019-10-11 | End: 2019-10-11 | Stop reason: HOSPADM

## 2019-10-11 RX ORDER — MIDAZOLAM HYDROCHLORIDE 1 MG/ML
INJECTION, SOLUTION INTRAMUSCULAR; INTRAVENOUS
Status: DISCONTINUED | OUTPATIENT
Start: 2019-10-11 | End: 2019-10-11 | Stop reason: HOSPADM

## 2019-10-11 RX ORDER — HEPARIN SODIUM 1000 [USP'U]/ML
INJECTION, SOLUTION INTRAVENOUS; SUBCUTANEOUS
Status: DISCONTINUED | OUTPATIENT
Start: 2019-10-11 | End: 2019-10-11 | Stop reason: HOSPADM

## 2019-10-11 RX ORDER — VERAPAMIL HYDROCHLORIDE 2.5 MG/ML
INJECTION, SOLUTION INTRAVENOUS
Status: DISCONTINUED | OUTPATIENT
Start: 2019-10-11 | End: 2019-10-11 | Stop reason: HOSPADM

## 2019-10-11 RX ADMIN — DIPHENHYDRAMINE HYDROCHLORIDE 50 MG: 50 CAPSULE ORAL at 11:10

## 2019-10-11 NOTE — INTERVAL H&P NOTE
The patient has been examined and the H&P has been reviewed:    I concur with the findings and no changes have occurred since H&P was written.    Anesthesia/Surgery risks, benefits and alternative options discussed and understood by patient/family.    Mr. Baron is here with family for LHC +/- PCI. No new complaints today. Nervous with the procedure. Took ASA / Plavix this morning. No Xarelto since Monday. TTE with layered LV thrombus, Viability with LAD territory 30% myocardial ischemia.     Plan for Right radial LHC.   Consents signed  Cr 1.1  - All patient's questions were answered.  -The risks, benefits and alternatives of the procedure were explained to the patient's family and surrogate decision maker.   -The risks of coronary angiography include but are not limited to: bleeding, infection, heart rhythm abnormalities, allergic reactions, kidney injury and potential need for dialysis, stroke and death.   - Should stenting be indicated, the patient has agreed to dual anti-platelet therapy for 1-consecutive year with a drug-eluting stent and a minimum of 1-month with the use of a bare metal stent  - Additionally, pt is aware that non-compliance is likely to result in stent clotting with heart attack, heart failure, and/or death  -The risks of moderate sedation include hypotension, respiratory depression, arrhythmias, bronchospasm, and death.   - Informed consent was obtained and the patient's family and surrogate decision maker is agreeable to proceed with the procedure.            Active Hospital Problems    Diagnosis  POA    Coronary artery disease involving native coronary artery of native heart without angina pectoris [I25.10]  Yes      Resolved Hospital Problems   No resolved problems to display.

## 2019-10-11 NOTE — PROGRESS NOTES
Pt DC'd per MD order. Discharge instructions given including activity, wound care, S&S of infections, future appointments, and when to call MD. Medications reviewed including when to take next dose. Telemetry and PIV DC'd, catheter tip intact. Pt refused transport and ambulated off unit with daughter.

## 2019-10-11 NOTE — CONSULTS
"Cardiac Rehab     Ella Baron   [id number]   10/11/2019       Activity taught: Yes    Cardiac Rehab Phase Taught: Phase I & II    Risk Factors-Modifiable: nutrition, hypertension, positive family history, tobacco use, stress, exercise    Risk Factors-Non modifiable: age    Teaching Method: Verbal, Written and Living with Heart Disease book.    Understanding/Response: Pt verbalized understanding, all questions answered. Pt understands their cardiac rehab order is good for 12 months.  Pt given order to go to [facility].     Comments: S/P PTCA. Discussed cardiac rehab and risk factor modification. Team to refer patient to [facility] Phase II. Educational materials were used in the process and given to the patient. They included "Your Guide to Living with Heart Disease", Phase Two Cardiac Rehabilitation information along with a sample Mediterranean diet.The patient expressed understanding of the teaching and expressed desire to take a role in modifying the risk factors when they return home.    JAROD Steward RN  Cardiac Rehab Nurse      "

## 2019-10-11 NOTE — BRIEF OP NOTE
"    Post Cath Note  Referring Physician: Carter Smith MD  Procedure: Left heart cath (Left), Stent, Drug Eluting, Single Vessel, Coronary       Access: Right radial    Mid LAD 80% stenosis proximal to a large diagonal branch  Otherwise non-obstructive CAD    See full report for further details    Intervention:     Successful PCI of mid LAD with 3.5 x 15 mm REMY    Closure device: Radial band    Post Cath Exam:   /61 (BP Location: Right arm, Patient Position: Lying)   Pulse 69   Temp 98 °F (36.7 °C) (Oral)   Resp 18   Ht 5' 6" (1.676 m)   Wt 64.4 kg (142 lb)   SpO2 97%   Breastfeeding? No   BMI 22.92 kg/m²   No unusual pain, hematoma, thrill or bruit at vascular access site.  Distal pulse present without signs of ischemia.    Recommendations:   - Routine post-cath care  - IVF at 100 cc/hr for 4 hrs  - Cardiac rehab referral  - Continue medical management  - Risk factor reduction  - Plavix for at least 1 year and ASA 81 mg indefinitely  - Follow-up with outpatient cardiologist (Dr. Mederos) as scheduled    Signed:  Jose L Stevens MD  Interventional Cardiology Fellow, PGY-7  10/11/2019 1:56 PM  "

## 2019-10-11 NOTE — PLAN OF CARE
Pt transferred from cath lab via stretcher.  Vss.  Post op orders and assessment initiated.  Pt aao, tolerating po.  Family called to bs.  Pt in no acute distress and verbalizes no complaints. Call bell within reach.  Will continue to monitor.

## 2019-10-12 NOTE — DISCHARGE SUMMARY
Discharge Summary  Interventional Cardiology      Admit Date: 10/11/2019    Discharge Date:  10/11/2019    Attending Physician: Carter Smith MD    Discharge Physician: oJse L Stevens MD    Principal Diagnoses: Coronary artery disease involving native coronary artery of native heart without angina pectoris  Indication for Admission: Left heart cath (Left), Stent, Drug Eluting, Single Vessel, Coronary    Discharged Condition: Good    Hospital Course:   Patient presented for outpatient coronary angiogram which went without complication. Coronary angiogram revealed mid LAD 80% stenosis proximal to a large diagonal branch. She underwent successful PCI of her mid LAD with a 3.5 x 15 mm REMY. See full cath report in Epic for details. Hemostasis of patient's R Radial access site was achieved with VascBand. Patient was monitored per post-cath protocol, and her R radial access site was c/d/i with no hematoma. Patient was able to ambulate without difficulty. She was feeling well and anticipating discharge home today.     Outpatient Plan:  - Cardiac rehab referral  - Continue medical management  - Risk factor reduction  - Plavix for at least 1 year and ASA 81 mg indefinitely  - Follow-up with outpatient cardiologist (Dr. Mederos) as scheduled    Diet: Cardiac diet    Activity: Ad king, wound care instructions provided    Disposition: Home or Self Care    Discharge Medications:      Medication List      CONTINUE taking these medications    amLODIPine 10 MG tablet  Commonly known as:  NORVASC     aspirin 81 MG Chew  Take 1 tablet (81 mg total) by mouth once daily.     atorvastatin 80 MG tablet  Commonly known as:  LIPITOR  Take 1 tablet (80 mg total) by mouth once daily.     clopidogrel 75 mg tablet  Commonly known as:  PLAVIX  Take 1 tablet (75 mg total) by mouth once daily.     losartan 25 MG tablet  Commonly known as:  COZAAR  Take 1 tablet (25 mg total) by mouth once daily.     metoprolol tartrate 25 MG tablet  Commonly known  as:  LOPRESSOR  Take 1 tablet (25 mg total) by mouth 2 (two) times daily.     rivaroxaban 20 mg Tab  Commonly known as:  XARELTO  Start after Xarelto starting packL Take 1 tablet (20 mg total) by mouth daily with dinner or evening meal.          Follow Up:  Follow-up Information     Yonny Mederos MD On 10/15/2019.    Specialty:  Cardiology  Contact information:  [address]  [address]  [facility] [address] [phone]

## 2019-10-14 LAB
POC ACTIVATED CLOTTING TIME K: 235 SEC (ref 74–137)
POC ACTIVATED CLOTTING TIME K: 301 SEC (ref 74–137)
SAMPLE: ABNORMAL
SAMPLE: ABNORMAL

## 2019-10-15 ENCOUNTER — OFFICE VISIT (OUTPATIENT)
Dept: CARDIOLOGY | Facility: CLINIC | Age: 74
End: 2019-10-15
Payer: COMMERCIAL

## 2019-10-15 VITALS
WEIGHT: 156.5 LBS | DIASTOLIC BLOOD PRESSURE: 72 MMHG | BODY MASS INDEX: 25.15 KG/M2 | SYSTOLIC BLOOD PRESSURE: 119 MMHG | HEIGHT: 66 IN | HEART RATE: 88 BPM | OXYGEN SATURATION: 97 %

## 2019-10-15 DIAGNOSIS — I50.43 ACUTE ON CHRONIC COMBINED SYSTOLIC AND DIASTOLIC CHF (CONGESTIVE HEART FAILURE): ICD-10-CM

## 2019-10-15 DIAGNOSIS — I71.9 PENETRATING ATHEROSCLEROTIC ULCER OF AORTA: Primary | ICD-10-CM

## 2019-10-15 DIAGNOSIS — I25.5 ISCHEMIC CARDIOMYOPATHY: ICD-10-CM

## 2019-10-15 DIAGNOSIS — I21.02 ACUTE ST ELEVATION MYOCARDIAL INFARCTION (STEMI) INVOLVING LEFT ANTERIOR DESCENDING (LAD) CORONARY ARTERY: ICD-10-CM

## 2019-10-15 DIAGNOSIS — I51.3 LEFT VENTRICULAR THROMBOSIS: ICD-10-CM

## 2019-10-15 DIAGNOSIS — I10 ESSENTIAL HYPERTENSION: ICD-10-CM

## 2019-10-15 PROCEDURE — 1101F PT FALLS ASSESS-DOCD LE1/YR: CPT | Mod: CPTII,S$GLB,, | Performed by: INTERNAL MEDICINE

## 2019-10-15 PROCEDURE — 99999 PR PBB SHADOW E&M-EST. PATIENT-LVL III: ICD-10-PCS | Mod: PBBFAC,,, | Performed by: INTERNAL MEDICINE

## 2019-10-15 PROCEDURE — 1101F PR PT FALLS ASSESS DOC 0-1 FALLS W/OUT INJ PAST YR: ICD-10-PCS | Mod: CPTII,S$GLB,, | Performed by: INTERNAL MEDICINE

## 2019-10-15 PROCEDURE — 99214 PR OFFICE/OUTPT VISIT, EST, LEVL IV, 30-39 MIN: ICD-10-PCS | Mod: S$GLB,,, | Performed by: INTERNAL MEDICINE

## 2019-10-15 PROCEDURE — 99214 OFFICE O/P EST MOD 30 MIN: CPT | Mod: S$GLB,,, | Performed by: INTERNAL MEDICINE

## 2019-10-15 PROCEDURE — 99999 PR PBB SHADOW E&M-EST. PATIENT-LVL III: CPT | Mod: PBBFAC,,, | Performed by: INTERNAL MEDICINE

## 2019-10-15 NOTE — PROGRESS NOTES
"Subjective:    Patient ID:  Ella Baron is a 74 y.o. female who presents for follow-up of Establish Care      HPI     STEMI 8/31/19 - medical Rx due to penetrating aortic ulcer, REMY to LAD 10/11/19, Ischemic CM EF 25%. LV thrombus on xarelto, HTN, HLD, former tobacco abuse    Followed at [facility]  74 year old female with PMH CAD, Ischemic cardiomyopathy (EF 25%, wearing life vest) with LV apical thrombus (on Xarelto), congenital absence of left ulnar artery, penetrating descending aortic ulcer, HTN, former tobacco use here for hospital follow-up of STEMI. The patient was in her usual state of health until labor day long weekend, when she was awakened at 4:00 AM on 8/31/19 with a "cramping" sensation across her chest and back, with paresthesia of both arms. She sat in her recliner, took 2 advil and this sensation subsided within 20-30 minutes. The next day she had the same episode after eating scrambled eggs, also resolved within 20-30 minutes of taking 2 advil. The third and final episode happened the following day, again after food intake, lasting 20-30 minutes. The following day she had no symptoms but saw her PCP who found STEMI on EKG, she was transferred to the ER, found to have a penetrating aortic ulcer, was transferred to [facility] and penetrating aortic ulcer was not intervened upon and she was outside of the time frame for angiography; CAD was managed medically. She was found to have a reduced LVEF of 25% with LAD territory WMA and LAD territory fixed defect on PET. She is scheduled for PET viability, follow-up with vascular surgery and Dr. Mederos who will become her primary cardiologist.     Other than the three episodes of chest/back "cramping" mentioned above, the patient has been completely asymptomatic. She denies GANT (NYHA I) and continues to perform all ADLs as before, denies orthopnea or PND but has noticed mild edema which she attributes to norvasc. She denies symptoms of angina, syncope or life vest shocks " and has had no claudication symptoms or skin ulceration. She is a former smoker, quit 30 days ago. No family history of CAD or sudden cardiac death. She is on triple therapy with ASA, plavix and Xarelto and has not had any bleeding issues.     The patient is free of angina s/p medically treated STEMI with no revascularization (asymptomatic and out of STEMI window upon arrival to [facility]).  Continue DAPT with ASA, plavix, high intensity statin, BB (metoprolol) and ARB (losartan)  PET with 40% LV myocardium fixed defect in LAD territory; viability study scheduled  Plan for LHC +/- PCI following viability study - no left radial access due to congenital absence of left ulnar artery  Will schedule cardiac rehab following LHC +/- PCI     1. Cardiac catheterization with probable PCI.   2. Antiplatelets: ASA, plavix  3. Access: Right radial  4. Catheters: Bayron. No LVEDP (thrombus)  5. Pt is a REMY candidate and understands the importance of taking plavix for at least one year, understands that in case of receiving a drug coated stent the failure to comply with dual anti-platelet therapy is likely to result in stent clothing, heart attack and death.   6. The risks, benefits, and alternatives of coronary vascular angiography and possible intervention were discussed with the patient. All questions were answered and informed consent was obtained. I had a detailed discussion with the patient regarding risk of stroke, MI, bleeding access site complications including limb loss, allergy, kidney failure including dialysis and death.  7. The patient understands the risks and benefits and wishes to go ahead with the procedure.  8. All patient's questions were answered     Ischemic cardiomyopathy with LV thrombus  NYHA I, LVEF 25%  Wearing life vest  Continue losartan, metoprolol  Educated on importance of fluid restriction and low sodium diet  Continue Xarelto for a total fo 3 months; we discussed the alternative of using warfarin due to  cost, however considering the requirement for consistent diet and therapeutic monitoring, the patient chose to remain on DOAC     Penetrating aortic ulcer  Scheduled for follow-up with vascular surgery     HTN  118/68  Continue norvasc, metoprolol, losartan     Tobacco abuse  Quit smoking 30 days ago    Admitted 10/11/19  Hospital Course:   Patient presented for outpatient coronary angiogram which went without complication. Coronary angiogram revealed mid LAD 80% stenosis proximal to a large diagonal branch. She underwent successful PCI of her mid LAD with a 3.5 x 15 mm REMY. See full cath report in Epic for details. Hemostasis of patient's R Radial access site was achieved with VascBand. Patient was monitored per post-cath protocol, and her R radial access site was c/d/i with no hematoma. Patient was able to ambulate without difficulty. She was feeling well and anticipating discharge home today.      Outpatient Plan:  - Cardiac rehab referral  - Continue medical management  - Risk factor reduction  - Plavix for at least 1 year and ASA 81 mg indefinitely  - Follow-up with outpatient cardiologist (Dr. Mederos) as scheduled    [facility] 10/11/19  · Successful PCI of 80% mid LAD with 3.5X15 REMY    Echo 9/4/19  · Severely decreased left ventricular systolic function. The estimated ejection fraction is 25%  · Local segmental wall motion abnormalities with an aneurysmal apex and layered thrombus int he apex  · Eccentric left ventricular hypertrophy.  · Moderate left atrial enlargement.  · Grade I (mild) left ventricular diastolic dysfunction consistent with impaired relaxation and elevated LVEDP  · Normal right ventricular systolic function.  · Mild aortic regurgitation.  · Mild mitral regurgitation.  · Mild tricuspid regurgitation.  · The estimated PA systolic pressure is 33 mm Hg  · Normal central venous pressure (3 mm Hg).    PET viability 10/8/19    There is a matched defect present in the mid to distal anteroseptal and the  apical walls comprising of ~30% of myocardium in the typical distribution of the LAD. This is non-viable.    Wearing Lifevest  Denies CP or SOB  Back to work  Has not yet started cardiac rehab    Review of Systems   Constitution: Negative for decreased appetite.   HENT: Negative for ear discharge.    Eyes: Negative for blurred vision.   Respiratory: Negative for hemoptysis.    Endocrine: Negative for polyphagia.   Hematologic/Lymphatic: Negative for adenopathy.   Skin: Negative for color change.   Musculoskeletal: Negative for joint swelling.   Genitourinary: Negative for bladder incontinence.   Neurological: Negative for brief paralysis.   Psychiatric/Behavioral: Negative for hallucinations.   Allergic/Immunologic: Negative for hives.        Objective:    Physical Exam   Constitutional: She is oriented to person, place, and time. She appears well-developed and well-nourished.   HENT:   Head: Normocephalic and atraumatic.   Eyes: Pupils are equal, round, and reactive to light. Conjunctivae are normal.   Neck: Normal range of motion. Neck supple.   Cardiovascular: Normal rate, normal heart sounds and intact distal pulses.   Pulmonary/Chest: Effort normal and breath sounds normal.   Abdominal: Soft. Bowel sounds are normal.   Musculoskeletal: Normal range of motion.   Neurological: She is alert and oriented to person, place, and time.   Skin: Skin is warm and dry.         Assessment:       1. Penetrating atherosclerotic ulcer of aorta    2. Acute ST elevation myocardial infarction (STEMI) involving left anterior descending (LAD) coronary artery    3. Essential hypertension    4. Acute on chronic combined systolic and diastolic CHF (congestive heart failure)    5. Left ventricular thrombosis    6. Ischemic cardiomyopathy         Plan:       Surprisingly asymptomatic after recent large anterior STEMI  Cardiac rehab at   OV 3 months with repeat echo - will recommend AICD if EF < 35%

## 2019-10-15 NOTE — LETTER
October 15, 2019      Kaden Yu Jr., MALDONADO  [address]  [facility] [address]           [facility]  [address]  [place] [address]  Phone: [phone]          Patient: Ella Baron   MR Number: [id number]   YOB: 1945   Date of Visit: 10/15/2019       Dear Kaden Yu Jr.:    Thank you for referring Ella Baron to me for evaluation. Attached you will find relevant portions of my assessment and plan of care.    If you have questions, please do not hesitate to call me. I look forward to following Ella Baron along with you.    Sincerely,    Yonny Mederos MD    Enclosure  CC:  No Recipients    If you would like to receive this communication electronically, please contact [email or web address] or [phone] to request more information on [employer] access.    For providers and/or their staff who would like to refer a patient to [facility], please contact us through our one-stop-shop provider referral line, [facility], at [phone].    If you feel you have received this communication in error or would no longer like to receive these types of communications, please e-mail [email or web address]

## 2019-10-16 ENCOUNTER — TELEPHONE (OUTPATIENT)
Dept: CARDIOLOGY | Facility: CLINIC | Age: 74
End: 2019-10-16

## 2019-10-25 ENCOUNTER — TELEPHONE (OUTPATIENT)
Dept: CARDIOLOGY | Facility: CLINIC | Age: 74
End: 2019-10-25

## 2019-11-20 ENCOUNTER — TELEPHONE (OUTPATIENT)
Dept: CARDIOLOGY | Facility: CLINIC | Age: 74
End: 2019-11-20

## 2019-11-20 NOTE — TELEPHONE ENCOUNTER
Spoke with Becca and she in order for patient to remain on life vest she is needing an echo.  I see that there is an order for it so I will call Patient to set that up.  If any other procedures are needed by you please advise.

## 2019-11-20 NOTE — TELEPHONE ENCOUNTER
----- Message from Torie Aragon sent at 11/20/2019 10:03 AM CST -----  Contact: [facility] life vest - [facility]   Type: Patient Call Back    What is the request in detail: Alex calling to speak to a nurse regarding eco     Can the clinic reply by [facility]? No    Would the patient rather a call back or a response via [facility]? Call back     Best call back number: [phone] Ext # [phone]

## 2019-12-06 ENCOUNTER — TELEPHONE (OUTPATIENT)
Dept: CARDIOLOGY | Facility: CLINIC | Age: 74
End: 2019-12-06

## 2019-12-06 NOTE — TELEPHONE ENCOUNTER
Patient called back to speak with Vanessa TATE with Dr. Mederos regarding whether or not she needs to have a echo due to her being in a life vest.  Will forward message to Vanessa, call came into me from call center.

## 2019-12-16 ENCOUNTER — NURSE TRIAGE (OUTPATIENT)
Dept: ADMINISTRATIVE | Facility: CLINIC | Age: 74
End: 2019-12-16

## 2019-12-16 NOTE — TELEPHONE ENCOUNTER
"Pt having some sob and dry cough that developed a few days ago, feels it's medication related.  Pt states sob is only with exertion, and resolves with a few minutes of rest.   Also informs that she held all medications one day last week, and symptoms improved, which makes her feel like it's medication related.  Pt states she's having a difficult time making it through cardiac rehab sessions.  Advised ED now for evaluation.    Reason for Disposition   Any history of prior "blood clot" in leg or lungs   Major surgery in the past month    Additional Information   Negative: Breathing stopped and hasn't returned   Negative: Choking on something   Negative: SEVERE difficulty breathing (e.g., struggling for each breath, speaks in single words, pulse > 120)   Negative: Bluish (or gray) lips or face   Negative: Difficult to awaken or acting confused (e.g., disoriented, slurred speech)   Negative: Passed out (i.e., fainted, collapsed and was not responding)   Negative: Wheezing started suddenly after medicine, an allergic food, or bee sting   Negative: Stridor   Negative: Slow, shallow and weak breathing   Negative: Sounds like a life-threatening emergency to the triager   Negative: Chest pain   Negative: Wheezing (high pitched whistling sound) and previous asthma attacks or use of asthma medicines   Negative: Difficulty breathing and only present when coughing   Negative: Difficulty breathing and only from stuffy or runny nose   Negative: MODERATE difficulty breathing (e.g., speaks in phrases, SOB even at rest, pulse 100-120) of new onset or worse than normal   Negative: Wheezing can be heard across the room   Negative: Drooling or spitting out saliva (because can't swallow)   Negative: Recent illness requiring prolonged bedrest (i.e., immobilization)   Negative: Hip or leg fracture in past 2 months (e.g., or had cast on leg or ankle)   Negative: Recent long-distance travel with prolonged time in car, " "bus, plane, or train (i.e., within past 2 weeks; 6 or  more hours duration)   Negative: Extra heart beats OR irregular heart beating   (i.e., "palpitations")    Protocols used: BREATHING DIFFICULTY-A-OH      "

## 2019-12-18 ENCOUNTER — OFFICE VISIT (OUTPATIENT)
Dept: CARDIOLOGY | Facility: CLINIC | Age: 74
End: 2019-12-18
Payer: COMMERCIAL

## 2019-12-18 VITALS
DIASTOLIC BLOOD PRESSURE: 60 MMHG | OXYGEN SATURATION: 96 % | BODY MASS INDEX: 25.26 KG/M2 | WEIGHT: 160.94 LBS | HEIGHT: 67 IN | SYSTOLIC BLOOD PRESSURE: 126 MMHG | HEART RATE: 68 BPM

## 2019-12-18 DIAGNOSIS — I71.9 PENETRATING ATHEROSCLEROTIC ULCER OF AORTA: Primary | ICD-10-CM

## 2019-12-18 DIAGNOSIS — I10 ESSENTIAL HYPERTENSION: ICD-10-CM

## 2019-12-18 DIAGNOSIS — I50.43 ACUTE ON CHRONIC COMBINED SYSTOLIC AND DIASTOLIC CHF (CONGESTIVE HEART FAILURE): ICD-10-CM

## 2019-12-18 PROCEDURE — 99999 PR PBB SHADOW E&M-EST. PATIENT-LVL III: ICD-10-PCS | Mod: PBBFAC,,, | Performed by: INTERNAL MEDICINE

## 2019-12-18 PROCEDURE — 99214 PR OFFICE/OUTPT VISIT, EST, LEVL IV, 30-39 MIN: ICD-10-PCS | Mod: S$GLB,,, | Performed by: INTERNAL MEDICINE

## 2019-12-18 PROCEDURE — 1126F PR PAIN SEVERITY QUANTIFIED, NO PAIN PRESENT: ICD-10-PCS | Mod: S$GLB,,, | Performed by: INTERNAL MEDICINE

## 2019-12-18 PROCEDURE — 1159F PR MEDICATION LIST DOCUMENTED IN MEDICAL RECORD: ICD-10-PCS | Mod: S$GLB,,, | Performed by: INTERNAL MEDICINE

## 2019-12-18 PROCEDURE — 99999 PR PBB SHADOW E&M-EST. PATIENT-LVL III: CPT | Mod: PBBFAC,,, | Performed by: INTERNAL MEDICINE

## 2019-12-18 PROCEDURE — 1101F PR PT FALLS ASSESS DOC 0-1 FALLS W/OUT INJ PAST YR: ICD-10-PCS | Mod: CPTII,S$GLB,, | Performed by: INTERNAL MEDICINE

## 2019-12-18 PROCEDURE — 1101F PT FALLS ASSESS-DOCD LE1/YR: CPT | Mod: CPTII,S$GLB,, | Performed by: INTERNAL MEDICINE

## 2019-12-18 PROCEDURE — 1159F MED LIST DOCD IN RCRD: CPT | Mod: S$GLB,,, | Performed by: INTERNAL MEDICINE

## 2019-12-18 PROCEDURE — 1126F AMNT PAIN NOTED NONE PRSNT: CPT | Mod: S$GLB,,, | Performed by: INTERNAL MEDICINE

## 2019-12-18 PROCEDURE — 99214 OFFICE O/P EST MOD 30 MIN: CPT | Mod: S$GLB,,, | Performed by: INTERNAL MEDICINE

## 2019-12-18 RX ORDER — FUROSEMIDE 20 MG/1
20 TABLET ORAL 2 TIMES DAILY
Qty: 60 TABLET | Refills: 11 | Status: SHIPPED | OUTPATIENT
Start: 2019-12-18 | End: 2020-04-29 | Stop reason: SDUPTHER

## 2019-12-18 RX ORDER — METOPROLOL SUCCINATE 50 MG/1
50 TABLET, EXTENDED RELEASE ORAL DAILY
COMMUNITY
End: 2020-01-10

## 2019-12-18 NOTE — LETTER
December 18, 2019      No Recipients           [facility]  [address]  [place] [address]  Phone: [phone]          Patient: Ella Baron   MR Number: [id number]   YOB: 1945   Date of Visit: 12/18/2019       Dear :    Thank you for referring Ella Baron to me for evaluation. Attached you will find relevant portions of my assessment and plan of care.    If you have questions, please do not hesitate to call me. I look forward to following Ella Baron along with you.    Sincerely,    Yonny Mederos MD    Enclosure  CC:  No Recipients    If you would like to receive this communication electronically, please contact [email or web address] or [phone] to request more information on [employer] Link access.    For providers and/or their staff who would like to refer a patient to [facility], please contact us through our one-stop-shop provider referral line, [facility], at [phone].    If you feel you have received this communication in error or would no longer like to receive these types of communications, please e-mail [email or web address]

## 2019-12-18 NOTE — PROGRESS NOTES
"Subjective:    Patient ID:  Ella Baron is a 74 y.o. female who presents for follow-up of Hospital Follow Up      HPI     STEMI 8/31/19 - medical Rx due to penetrating aortic ulcer, REMY to LAD 10/11/19, Ischemic CM EF 25%. LV thrombus on xarelto, HTN, HLD, former tobacco abuse     Followed at [facility] [place], [place]  74 year old female with PMH CAD, Ischemic cardiomyopathy (EF 25%, wearing life vest) with LV apical thrombus (on Xarelto), congenital absence of left ulnar artery, penetrating descending aortic ulcer, HTN, former tobacco use here for hospital follow-up of STEMI. The patient was in her usual state of health until labor day long weekend, when she was awakened at 4:00 AM on 8/31/19 with a "cramping" sensation across her chest and back, with paresthesia of both arms. She sat in her recliner, took 2 advil and this sensation subsided within 20-30 minutes. The next day she had the same episode after eating scrambled eggs, also resolved within 20-30 minutes of taking 2 advil. The third and final episode happened the following day, again after food intake, lasting 20-30 minutes. The following day she had no symptoms but saw her PCP who found STEMI on EKG, she was transferred to the ER, found to have a penetrating aortic ulcer, was transferred to [facility] [place], [place] and penetrating aortic ulcer was not intervened upon and she was outside of the time frame for angiography; CAD was managed medically. She was found to have a reduced LVEF of 25% with LAD territory WMA and LAD territory fixed defect on PET. She is scheduled for PET viability, follow-up with vascular surgery and Dr. Mederos who will become her primary cardiologist.     Other than the three episodes of chest/back "cramping" mentioned above, the patient has been completely asymptomatic. She denies GANT (NYHA I) and continues to perform all ADLs as before, denies orthopnea or PND but has noticed mild edema which she attributes to norvasc. She denies symptoms of angina, syncope or life vest " shocks and has had no claudication symptoms or skin ulceration. She is a former smoker, quit 30 days ago. No family history of CAD or sudden cardiac death. She is on triple therapy with ASA, plavix and Xarelto and has not had any bleeding issues.     The patient is free of angina s/p medically treated STEMI with no revascularization (asymptomatic and out of STEMI window upon [place] [facility] [place]  Continue DAPT with ASA, plavix, high intensity statin, BB (metoprolol) and ARB (losartan)  PET with 40% LV myocardium fixed defect in LAD territory; viability study scheduled  Plan for LHC +/- PCI following viability study - no left radial access due to congenital absence of left ulnar artery  Will schedule cardiac rehab following LHC +/- PCI     1. Cardiac catheterization with probable PCI.   2. Antiplatelets: ASA, plavix  3. Access: Right radial  4. Catheters: Bayron. No LVEDP (thrombus)  5. Pt is a REMY candidate and understands the importance of taking plavix for at least one year, understands that in case of receiving a drug coated stent the failure to comply with dual anti-platelet therapy is likely to result in stent clothing, heart attack and death.   6. The risks, benefits, and alternatives of coronary vascular angiography and possible intervention were discussed with the patient. All questions were answered and informed consent was obtained. I had a detailed discussion with the patient regarding risk of stroke, MI, bleeding access site complications including limb loss, allergy, kidney failure including dialysis and death.  7. The patient understands the risks and benefits and wishes to go ahead with the procedure.  8. All patient's questions were answered     Ischemic cardiomyopathy with LV thrombus  NYHA I, LVEF 25%  Wearing life vest  Continue losartan, metoprolol  Educated on importance of fluid restriction and low sodium diet  Continue Xarelto for a total fo 3 months; we discussed the alternative of using warfarin  due to cost, however considering the requirement for consistent diet and therapeutic monitoring, the patient chose to remain on DOAC     Penetrating aortic ulcer  Scheduled for follow-up with vascular surgery     HTN  118/68  Continue norvasc, metoprolol, losartan     Tobacco abuse  Quit smoking 30 days ago    Admitted 10/11/19  Hospital Course:   Patient presented for outpatient coronary angiogram which went without complication. Coronary angiogram revealed mid LAD 80% stenosis proximal to a large diagonal branch. She underwent successful PCI of her mid LAD with a 3.5 x 15 mm REMY. See full cath report in Epic for details. Hemostasis of patient's R Radial access site was achieved with VascBand. Patient was monitored per post-cath protocol, and her R radial access site was c/d/i with no hematoma. Patient was able to ambulate without difficulty. She was feeling well and anticipating discharge home today.      Outpatient Plan:  - Cardiac rehab referral  - Continue medical management  - Risk factor reduction  - Plavix for at least 1 year and ASA 81 mg indefinitely  - Follow-up with outpatient cardiologist (Dr. Mederos) as scheduled     [facility] 10/11/19  · Successful PCI of 80% mid LAD with 3.5X15 REMY     Echo [facility] 12/17/19    Technically difficult study.      Contrast was administered and successfully enabled adequate endocardial definition.     Mildly increased left ventricular cavity size.     Severely decreased left ventricular systolic function.     Left ventricular ejection fraction is estimated at 30%.     No apical thrombus noted.     Mildly increased left atrial size.     Mild mitral annular calcification.     Mild-moderate mitral valve regurgitation.     Mild aortic valve regurgitation.     Trace tricuspid valve regurgitation.      Echo 9/4/19  · Severely decreased left ventricular systolic function. The estimated ejection fraction is 25%  · Local segmental wall motion abnormalities with an aneurysmal apex and layered  thrombus int he apex  · Eccentric left ventricular hypertrophy.  · Moderate left atrial enlargement.  · Grade I (mild) left ventricular diastolic dysfunction consistent with impaired relaxation and elevated LVEDP  · Normal right ventricular systolic function.  · Mild aortic regurgitation.  · Mild mitral regurgitation.  · Mild tricuspid regurgitation.  · The estimated PA systolic pressure is 33 mm Hg  · Normal central venous pressure (3 mm Hg).     PET viability 10/8/19    There is a matched defect present in the mid to distal anteroseptal and the apical walls comprising of ~30% of myocardium in the typical distribution of the LAD. This is non-viable.     10/15/19 Wearing Lifevest  Denies CP or SOB  Back to work  Has not yet started cardiac rehab  Surprisingly asymptomatic after recent large anterior STEMI  Cardiac rehab at   [facility] 3 months with repeat echo - will recommend AICD if EF < 35%      12/18/19 Admitted to  with CHF this month  GANT still present but not as severe      Review of Systems   Constitution: Negative for decreased appetite.   HENT: Negative for ear discharge.    Eyes: Negative for blurred vision.   Respiratory: Negative for hemoptysis.    Endocrine: Negative for polyphagia.   Hematologic/Lymphatic: Negative for adenopathy.   Skin: Negative for color change.   Musculoskeletal: Negative for joint swelling.   Genitourinary: Negative for bladder incontinence.   Neurological: Negative for brief paralysis.   Psychiatric/Behavioral: Negative for hallucinations.   Allergic/Immunologic: Negative for hives.        Objective:    Physical Exam   Constitutional: She is oriented to person, place, and time. She appears well-developed and well-nourished.   HENT:   Head: Normocephalic and atraumatic.   Eyes: Pupils are equal, round, and reactive to light. Conjunctivae are normal.   Neck: Normal range of motion. Neck supple.   Cardiovascular: Normal rate, normal heart sounds and intact distal pulses.    Pulmonary/Chest: Effort normal and breath sounds normal.   Abdominal: Soft. Bowel sounds are normal.   Musculoskeletal: Normal range of motion.   Neurological: She is alert and oriented to person, place, and time.   Skin: Skin is warm and dry.         Assessment:       1. Penetrating atherosclerotic ulcer of aorta    2. Essential hypertension    3. Acute on chronic combined systolic and diastolic CHF (congestive heart failure)         Plan:

## 2020-01-02 ENCOUNTER — PATIENT MESSAGE (OUTPATIENT)
Dept: CARDIOLOGY | Facility: CLINIC | Age: 75
End: 2020-01-02

## 2020-01-20 ENCOUNTER — HOSPITAL ENCOUNTER (OUTPATIENT)
Dept: CARDIOLOGY | Facility: HOSPITAL | Age: 75
Discharge: HOME | End: 2020-01-20
Attending: INTERNAL MEDICINE
Payer: COMMERCIAL

## 2020-01-20 DIAGNOSIS — I51.3 LEFT VENTRICULAR THROMBOSIS: ICD-10-CM

## 2020-01-20 DIAGNOSIS — I21.02 ACUTE ST ELEVATION MYOCARDIAL INFARCTION (STEMI) INVOLVING LEFT ANTERIOR DESCENDING (LAD) CORONARY ARTERY: ICD-10-CM

## 2020-01-20 DIAGNOSIS — I71.9 PENETRATING ATHEROSCLEROTIC ULCER OF AORTA: ICD-10-CM

## 2020-01-20 DIAGNOSIS — I10 ESSENTIAL HYPERTENSION: ICD-10-CM

## 2020-01-20 DIAGNOSIS — I25.5 ISCHEMIC CARDIOMYOPATHY: ICD-10-CM

## 2020-01-20 DIAGNOSIS — I50.43 ACUTE ON CHRONIC COMBINED SYSTOLIC AND DIASTOLIC CHF (CONGESTIVE HEART FAILURE): ICD-10-CM

## 2020-01-20 LAB
AORTIC ROOT ANNULUS: 3.03 CM
AORTIC VALVE CUSP SEPERATION: 1.95 CM
ASCENDING AORTA: 3.06 CM
AV INDEX (PROSTH): 0.63
AV MEAN GRADIENT: 6 MMHG
AV PEAK GRADIENT: 10 MMHG
AV VALVE AREA: 2.6 CM2
AV VELOCITY RATIO: 0.66
CV ECHO LV RWT: 0.52 CM
DOP CALC AO PEAK VEL: 1.55 M/S
DOP CALC AO VTI: 32.01 CM
DOP CALC LVOT AREA: 4.1 CM2
DOP CALC LVOT DIAMETER: 2.29 CM
DOP CALC LVOT PEAK VEL: 1.02 M/S
DOP CALC LVOT STROKE VOLUME: 83.07 CM3
DOP CALCLVOT PEAK VEL VTI: 20.18 CM
E WAVE DECELERATION TIME: 243.18 MSEC
E/A RATIO: 0.67
E/E' RATIO: 21 M/S
ECHO LV POSTERIOR WALL: 1.24 CM (ref 0.6–1.1)
FRACTIONAL SHORTENING: 12 % (ref 28–44)
INTERVENTRICULAR SEPTUM: 1.24 CM (ref 0.6–1.1)
IVRT: 0.12 MSEC
LA MAJOR: 5.74 CM
LA MINOR: 5.7 CM
LA WIDTH: 4.85 CM
LEFT ATRIUM SIZE: 4.1 CM
LEFT ATRIUM VOLUME: 96.68 CM3
LEFT INTERNAL DIMENSION IN SYSTOLE: 4.19 CM (ref 2.1–4)
LEFT VENTRICLE DIASTOLIC VOLUME: 105.74 ML
LEFT VENTRICLE SYSTOLIC VOLUME: 78.28 ML
LEFT VENTRICULAR INTERNAL DIMENSION IN DIASTOLE: 4.77 CM (ref 3.5–6)
LEFT VENTRICULAR MASS: 227.36 G
LV LATERAL E/E' RATIO: 16.8 M/S
LV SEPTAL E/E' RATIO: 28 M/S
MV PEAK A VEL: 1.25 M/S
MV PEAK E VEL: 0.84 M/S
PISA TR MAX VEL: 1.67 M/S
PV PEAK VELOCITY: 0.9 CM/S
RA MAJOR: 5.07 CM
RA PRESSURE: 3 MMHG
RA WIDTH: 4.22 CM
RIGHT VENTRICULAR END-DIASTOLIC DIMENSION: 4.11 CM
RV TISSUE DOPPLER FREE WALL SYSTOLIC VELOCITY 1 (APICAL 4 CHAMBER VIEW): 13.18 CM/S
SINUS: 3.08 CM
STJ: 2.55 CM
TDI LATERAL: 0.05 M/S
TDI SEPTAL: 0.03 M/S
TDI: 0.04 M/S
TR MAX PG: 11 MMHG
TRICUSPID ANNULAR PLANE SYSTOLIC EXCURSION: 2.08 CM
TV REST PULMONARY ARTERY PRESSURE: 14 MMHG

## 2020-01-20 PROCEDURE — 93306 TTE W/DOPPLER COMPLETE: CPT

## 2020-01-20 PROCEDURE — 93306 TTE W/DOPPLER COMPLETE: CPT | Mod: 26,,, | Performed by: INTERNAL MEDICINE

## 2020-01-20 PROCEDURE — 93306 ECHO (CUPID ONLY): ICD-10-PCS | Mod: 26,,, | Performed by: INTERNAL MEDICINE

## 2020-01-21 ENCOUNTER — OFFICE VISIT (OUTPATIENT)
Dept: CARDIOLOGY | Facility: CLINIC | Age: 75
End: 2020-01-21
Payer: COMMERCIAL

## 2020-01-21 VITALS
DIASTOLIC BLOOD PRESSURE: 66 MMHG | HEART RATE: 66 BPM | SYSTOLIC BLOOD PRESSURE: 122 MMHG | HEIGHT: 67 IN | OXYGEN SATURATION: 96 % | BODY MASS INDEX: 25.59 KG/M2

## 2020-01-21 DIAGNOSIS — I51.3 LEFT VENTRICULAR THROMBOSIS: ICD-10-CM

## 2020-01-21 DIAGNOSIS — I21.02 ACUTE ST ELEVATION MYOCARDIAL INFARCTION (STEMI) INVOLVING LEFT ANTERIOR DESCENDING (LAD) CORONARY ARTERY: ICD-10-CM

## 2020-01-21 DIAGNOSIS — I71.9 PENETRATING ATHEROSCLEROTIC ULCER OF AORTA: Primary | ICD-10-CM

## 2020-01-21 DIAGNOSIS — I50.43 ACUTE ON CHRONIC COMBINED SYSTOLIC AND DIASTOLIC CHF (CONGESTIVE HEART FAILURE): ICD-10-CM

## 2020-01-21 DIAGNOSIS — I25.5 ISCHEMIC CARDIOMYOPATHY: ICD-10-CM

## 2020-01-21 DIAGNOSIS — I10 ESSENTIAL HYPERTENSION: ICD-10-CM

## 2020-01-21 PROCEDURE — 1159F MED LIST DOCD IN RCRD: CPT | Mod: S$GLB,,, | Performed by: INTERNAL MEDICINE

## 2020-01-21 PROCEDURE — 1159F PR MEDICATION LIST DOCUMENTED IN MEDICAL RECORD: ICD-10-PCS | Mod: S$GLB,,, | Performed by: INTERNAL MEDICINE

## 2020-01-21 PROCEDURE — 3074F SYST BP LT 130 MM HG: CPT | Mod: CPTII,S$GLB,, | Performed by: INTERNAL MEDICINE

## 2020-01-21 PROCEDURE — 3078F DIAST BP <80 MM HG: CPT | Mod: CPTII,S$GLB,, | Performed by: INTERNAL MEDICINE

## 2020-01-21 PROCEDURE — 1126F PR PAIN SEVERITY QUANTIFIED, NO PAIN PRESENT: ICD-10-PCS | Mod: S$GLB,,, | Performed by: INTERNAL MEDICINE

## 2020-01-21 PROCEDURE — 1101F PR PT FALLS ASSESS DOC 0-1 FALLS W/OUT INJ PAST YR: ICD-10-PCS | Mod: CPTII,S$GLB,, | Performed by: INTERNAL MEDICINE

## 2020-01-21 PROCEDURE — 3078F PR MOST RECENT DIASTOLIC BLOOD PRESSURE < 80 MM HG: ICD-10-PCS | Mod: CPTII,S$GLB,, | Performed by: INTERNAL MEDICINE

## 2020-01-21 PROCEDURE — 99214 PR OFFICE/OUTPT VISIT, EST, LEVL IV, 30-39 MIN: ICD-10-PCS | Mod: S$GLB,,, | Performed by: INTERNAL MEDICINE

## 2020-01-21 PROCEDURE — 99214 OFFICE O/P EST MOD 30 MIN: CPT | Mod: S$GLB,,, | Performed by: INTERNAL MEDICINE

## 2020-01-21 PROCEDURE — 1101F PT FALLS ASSESS-DOCD LE1/YR: CPT | Mod: CPTII,S$GLB,, | Performed by: INTERNAL MEDICINE

## 2020-01-21 PROCEDURE — 99999 PR PBB SHADOW E&M-EST. PATIENT-LVL III: CPT | Mod: PBBFAC,,, | Performed by: INTERNAL MEDICINE

## 2020-01-21 PROCEDURE — 1126F AMNT PAIN NOTED NONE PRSNT: CPT | Mod: S$GLB,,, | Performed by: INTERNAL MEDICINE

## 2020-01-21 PROCEDURE — 3074F PR MOST RECENT SYSTOLIC BLOOD PRESSURE < 130 MM HG: ICD-10-PCS | Mod: CPTII,S$GLB,, | Performed by: INTERNAL MEDICINE

## 2020-01-21 PROCEDURE — 99999 PR PBB SHADOW E&M-EST. PATIENT-LVL III: ICD-10-PCS | Mod: PBBFAC,,, | Performed by: INTERNAL MEDICINE

## 2020-01-21 NOTE — PROGRESS NOTES
"Subjective:    Patient ID:  Ella Baron is a 74 y.o. female who presents for follow-up of Congestive Heart Failure      HPI     STEMI 8/31/19 - medical Rx due to penetrating aortic ulcer, REMY to LAD 10/11/19, Ischemic CM EF 25%. LV thrombus on xarelto, HTN, HLD, former tobacco abuse     Followed at [facility] [place]  74 year old female with PMH CAD, Ischemic cardiomyopathy (EF 25%, wearing life vest) with LV apical thrombus (on Xarelto), congenital absence of left ulnar artery, penetrating descending aortic ulcer, HTN, former tobacco use here for hospital follow-up of STEMI. The patient was in her usual state of health until labor day long weekend, when she was awakened at 4:00 AM on 8/31/19 with a "cramping" sensation across her chest and back, with paresthesia of both arms. She sat in her recliner, took 2 advil and this sensation subsided within 20-30 minutes. The next day she had the same episode after eating scrambled eggs, also resolved within 20-30 minutes of taking 2 advil. The third and final episode happened the following day, again after food intake, lasting 20-30 minutes. The following day she had no symptoms but saw her PCP who found STEMI on EKG, she was transferred to the ER, found to have a penetrating aortic ulcer, was transferred to [facility] [place] and penetrating aortic ulcer was not intervened upon and she was outside of the time frame for angiography; CAD was managed medically. She was found to have a reduced LVEF of 25% with LAD territory WMA and LAD territory fixed defect on PET. She is scheduled for PET viability, follow-up with vascular surgery and Dr. Mederos who will become her primary cardiologist.     Other than the three episodes of chest/back "cramping" mentioned above, the patient has been completely asymptomatic. She denies GANT (NYHA I) and continues to perform all ADLs as before, denies orthopnea or PND but has noticed mild edema which she attributes to norvasc. She denies symptoms of angina, syncope or life " vest shocks and has had no claudication symptoms or skin ulceration. She is a former smoker, quit 30 days ago. No family history of CAD or sudden cardiac death. She is on triple therapy with ASA, plavix and Xarelto and has not had any bleeding issues.     The patient is free of angina s/p medically treated STEMI with no revascularization (asymptomatic and out of STEMI window upon arrival to [facility] [place]  Continue DAPT with ASA, plavix, high intensity statin, BB (metoprolol) and ARB (losartan)  PET with 40% LV myocardium fixed defect in LAD territory; viability study scheduled  Plan for LHC +/- PCI following viability study - no left radial access due to congenital absence of left ulnar artery  Will schedule cardiac rehab following LHC +/- PCI     1. Cardiac catheterization with probable PCI.   2. Antiplatelets: ASA, plavix  3. Access: Right radial  4. Catheters: Bayron. No LVEDP (thrombus)  5. Pt is a REMY candidate and understands the importance of taking plavix for at least one year, understands that in case of receiving a drug coated stent the failure to comply with dual anti-platelet therapy is likely to result in stent clothing, heart attack and death.   6. The risks, benefits, and alternatives of coronary vascular angiography and possible intervention were discussed with the patient. All questions were answered and informed consent was obtained. I had a detailed discussion with the patient regarding risk of stroke, MI, bleeding access site complications including limb loss, allergy, kidney failure including dialysis and death.  7. The patient understands the risks and benefits and wishes to go ahead with the procedure.  8. All patient's questions were answered     Ischemic cardiomyopathy with LV thrombus  NYHA I, LVEF 25%  Wearing life vest  Continue losartan, metoprolol  Educated on importance of fluid restriction and low sodium diet  Continue Xarelto for a total fo 3 months; we discussed the alternative of using  warfarin due to cost, however considering the requirement for consistent diet and therapeutic monitoring, the patient chose to remain on DOAC     Penetrating aortic ulcer  Scheduled for follow-up with vascular surgery     HTN  118/68  Continue norvasc, metoprolol, losartan     Tobacco abuse  Quit smoking 30 days ago    Admitted 10/11/19  Hospital Course:   Patient presented for outpatient coronary angiogram which went without complication. Coronary angiogram revealed mid LAD 80% stenosis proximal to a large diagonal branch. She underwent successful PCI of her mid LAD with a 3.5 x 15 mm REMY. See full cath report in Epic for details. Hemostasis of patient's R Radial access site was achieved with VascBand. Patient was monitored per post-cath protocol, and her R radial access site was c/d/i with no hematoma. Patient was able to ambulate without difficulty. She was feeling well and anticipating discharge home today.      Outpatient Plan:  - Cardiac rehab referral  - Continue medical management  - Risk factor reduction  - Plavix for at least 1 year and ASA 81 mg indefinitely  - Follow-up with outpatient cardiologist (Dr. Mederos) as scheduled     [facility] 10/11/19  · Successful PCI of 80% mid LAD with 3.5X15 REMY     Echo 1/20/20  · Concentric left ventricular hypertrophy.  · Moderately decreased left ventricular systolic function. The estimated ejection fraction is 35%.  · Grade I (mild) left ventricular diastolic dysfunction consistent with impaired relaxation.  · Local segmental wall motion abnormalities.  · Normal right ventricular systolic function.  · Mild left atrial enlargement.  · Moderate aortic regurgitation.  · Mild mitral regurgitation.  · No pulmonary hypertension present.  · Normal central venous pressure (3 mmHg).  · The estimated PA systolic pressure is 14 mmHg.          Echo [facility] 12/17/19    Technically difficult study.      Contrast was administered and successfully enabled adequate endocardial definition.      Mildly increased left ventricular cavity size.     Severely decreased left ventricular systolic function.     Left ventricular ejection fraction is estimated at 30%.     No apical thrombus noted.     Mildly increased left atrial size.     Mild mitral annular calcification.     Mild-moderate mitral valve regurgitation.     Mild aortic valve regurgitation.     Trace tricuspid valve regurgitation.      Echo 9/4/19  · Severely decreased left ventricular systolic function. The estimated ejection fraction is 25%  · Local segmental wall motion abnormalities with an aneurysmal apex and layered thrombus int he apex  · Eccentric left ventricular hypertrophy.  · Moderate left atrial enlargement.  · Grade I (mild) left ventricular diastolic dysfunction consistent with impaired relaxation and elevated LVEDP  · Normal right ventricular systolic function.  · Mild aortic regurgitation.  · Mild mitral regurgitation.  · Mild tricuspid regurgitation.  · The estimated PA systolic pressure is 33 mm Hg  · Normal central venous pressure (3 mm Hg).     PET viability 10/8/19    There is a matched defect present in the mid to distal anteroseptal and the apical walls comprising of ~30% of myocardium in the typical distribution of the LAD. This is non-viable.     10/15/19 Wearing Lifevest  Denies CP or SOB  Back to work  Has not yet started cardiac rehab  Surprisingly asymptomatic after recent large anterior STEMI  Cardiac rehab at   [facility] 3 months with repeat echo - will recommend AICD if EF < 35%     12/18/19 Admitted to  with CHF this month  GANT still present but not as severe     Labs 1/20/20  K 4.8  Cr 1.2      1/21/20 Doing well with cardiac rehab  Denies CP or SOB    Review of Systems   Constitution: Negative for decreased appetite.   HENT: Negative for ear discharge.    Eyes: Negative for blurred vision.   Respiratory: Negative for hemoptysis.    Endocrine: Negative for polyphagia.   Hematologic/Lymphatic: Negative for  adenopathy.   Skin: Negative for color change.   Musculoskeletal: Negative for joint swelling.   Genitourinary: Negative for bladder incontinence.   Neurological: Negative for brief paralysis.   Psychiatric/Behavioral: Negative for hallucinations.   Allergic/Immunologic: Negative for hives.        Objective:    Physical Exam   Constitutional: She is oriented to person, place, and time. She appears well-developed and well-nourished.   HENT:   Head: Normocephalic and atraumatic.   Eyes: Pupils are equal, round, and reactive to light. Conjunctivae are normal.   Neck: Normal range of motion. Neck supple.   Cardiovascular: Normal rate, normal heart sounds and intact distal pulses.   Pulmonary/Chest: Effort normal and breath sounds normal.   Abdominal: Soft. Bowel sounds are normal.   Musculoskeletal: Normal range of motion.   Neurological: She is alert and oriented to person, place, and time.   Skin: Skin is warm and dry.         Assessment:       1. Penetrating atherosclerotic ulcer of aorta    2. Acute ST elevation myocardial infarction (STEMI) involving left anterior descending (LAD) coronary artery    3. Essential hypertension    4. Acute on chronic combined systolic and diastolic CHF (congestive heart failure)    5. Ischemic cardiomyopathy    6. Left ventricular thrombosis         Plan:       EF now at 35% - discussed AICD - she wants to hold off  Stop Lifevest  Thrombus not seen on echo - will continue with xarelto for now  [facility] 3 months with BNP, BMP

## 2020-03-03 ENCOUNTER — TELEPHONE (OUTPATIENT)
Dept: CARDIOLOGY | Facility: CLINIC | Age: 75
End: 2020-03-03

## 2020-03-03 NOTE — TELEPHONE ENCOUNTER
----- Message from Muna Harmon MA sent at 3/2/2020  2:58 PM CST -----  Contact: Self  Pt want to know how many more rehab sessions she need? She went 15 times and states its getting expensive.   ----- Message -----  From: Teena Mendoza  Sent: 3/2/2020   2:16 PM CST  To: [facility] Staff    Type: Patient Call Back    Who called:Self    What is the request in detail: Patient needs to confirm how many cardiac rehab visit she is required to complete. Please advise.    Can the clinic reply by [facility]? No    Would the patient rather a call back or a response via My [facility]? Call    Best call back number: [phone]    Additional Information:n.a

## 2020-04-29 RX ORDER — CLOPIDOGREL BISULFATE 75 MG/1
75 TABLET ORAL DAILY
Qty: 90 TABLET | Refills: 3 | Status: SHIPPED | OUTPATIENT
Start: 2020-04-29 | End: 2021-04-01

## 2020-04-29 RX ORDER — METOPROLOL SUCCINATE 50 MG/1
50 TABLET, EXTENDED RELEASE ORAL DAILY
Qty: 90 TABLET | Refills: 3 | Status: SHIPPED | OUTPATIENT
Start: 2020-04-29 | End: 2021-04-01

## 2020-04-29 RX ORDER — LOSARTAN POTASSIUM 25 MG/1
25 TABLET ORAL DAILY
Qty: 90 TABLET | Refills: 3 | Status: SHIPPED | OUTPATIENT
Start: 2020-04-29 | End: 2021-04-01

## 2020-04-29 RX ORDER — FUROSEMIDE 20 MG/1
20 TABLET ORAL 2 TIMES DAILY
Qty: 180 TABLET | Refills: 3 | Status: SHIPPED | OUTPATIENT
Start: 2020-04-29 | End: 2020-06-23 | Stop reason: SDUPTHER

## 2020-04-29 RX ORDER — ATORVASTATIN CALCIUM 80 MG/1
80 TABLET, FILM COATED ORAL DAILY
Qty: 90 TABLET | Refills: 3 | Status: SHIPPED | OUTPATIENT
Start: 2020-04-29 | End: 2021-04-01

## 2020-04-29 NOTE — TELEPHONE ENCOUNTER
Called Patient and no answer, left voicemail letting her know that Dr. Mederos [facility]

## 2020-06-04 NOTE — TELEPHONE ENCOUNTER
----- Message from Crys Walton sent at 6/4/2020  9:35 AM CDT -----  Contact: Self  Type: RX Refill Request    Who Called:  self    Have you contacted your pharmacy: no    Refill or New Rx: refill     RX Name and Strength: amLODIPine (NORVASC) 10 MG tablet    Is this a 30 day or 90 day RX: 90 day    Preferred Pharmacy with phone number:[facility] [address] [phone] (Phone)  [phone] (Fax)        Local or Mail Order:  mail  Ordering Provider: bren    Would the patient rather a call back or a response via [facility]?  call  Best Call Back Number: [phone]

## 2020-06-05 RX ORDER — AMLODIPINE BESYLATE 10 MG/1
5 TABLET ORAL DAILY
Qty: 90 TABLET | Refills: 3 | Status: SHIPPED | OUTPATIENT
Start: 2020-06-05 | End: 2021-08-30

## 2020-06-05 NOTE — TELEPHONE ENCOUNTER
Spoke to pt    ----- Message from Beronica Beebe sent at 2020 11:01 AM CDT -----  Contact: SHARDA SKELTON [[id number]]   Name of Who is [phone]     What is the request in detail:  Patient request call back in reference to medication  amLODIPine (NORVASC) 10 MG tablet// patient need 90 day prescription called into pharmacy    Please contact to further discuss and advise      Can the clinic reply by [facility]: no     What Number to Call Back if not in [facility]:  [facility] - [facility], [place]  [address] [facility]

## 2020-06-23 ENCOUNTER — OFFICE VISIT (OUTPATIENT)
Dept: CARDIOLOGY | Facility: CLINIC | Age: 75
End: 2020-06-23
Payer: COMMERCIAL

## 2020-06-23 VITALS
WEIGHT: 176.38 LBS | RESPIRATION RATE: 16 BRPM | OXYGEN SATURATION: 98 % | DIASTOLIC BLOOD PRESSURE: 86 MMHG | HEART RATE: 91 BPM | SYSTOLIC BLOOD PRESSURE: 155 MMHG | BODY MASS INDEX: 28.04 KG/M2

## 2020-06-23 DIAGNOSIS — I71.9 PENETRATING ATHEROSCLEROTIC ULCER OF AORTA: ICD-10-CM

## 2020-06-23 DIAGNOSIS — Z09 FOLLOW UP: ICD-10-CM

## 2020-06-23 DIAGNOSIS — I10 ESSENTIAL HYPERTENSION: ICD-10-CM

## 2020-06-23 DIAGNOSIS — I50.43 ACUTE ON CHRONIC COMBINED SYSTOLIC AND DIASTOLIC CHF (CONGESTIVE HEART FAILURE): ICD-10-CM

## 2020-06-23 DIAGNOSIS — I51.3 LEFT VENTRICULAR THROMBOSIS: ICD-10-CM

## 2020-06-23 DIAGNOSIS — I25.10 CORONARY ARTERY DISEASE INVOLVING NATIVE CORONARY ARTERY OF NATIVE HEART WITHOUT ANGINA PECTORIS: ICD-10-CM

## 2020-06-23 DIAGNOSIS — I25.5 ISCHEMIC CARDIOMYOPATHY: Primary | ICD-10-CM

## 2020-06-23 PROCEDURE — 93000 ELECTROCARDIOGRAM COMPLETE: CPT | Mod: S$GLB,,, | Performed by: INTERNAL MEDICINE

## 2020-06-23 PROCEDURE — 1126F AMNT PAIN NOTED NONE PRSNT: CPT | Mod: S$GLB,,, | Performed by: INTERNAL MEDICINE

## 2020-06-23 PROCEDURE — 3079F DIAST BP 80-89 MM HG: CPT | Mod: CPTII,S$GLB,, | Performed by: INTERNAL MEDICINE

## 2020-06-23 PROCEDURE — 3077F PR MOST RECENT SYSTOLIC BLOOD PRESSURE >= 140 MM HG: ICD-10-PCS | Mod: CPTII,S$GLB,, | Performed by: INTERNAL MEDICINE

## 2020-06-23 PROCEDURE — 99214 OFFICE O/P EST MOD 30 MIN: CPT | Mod: S$GLB,,, | Performed by: INTERNAL MEDICINE

## 2020-06-23 PROCEDURE — 99999 PR PBB SHADOW E&M-EST. PATIENT-LVL III: CPT | Mod: PBBFAC,,, | Performed by: INTERNAL MEDICINE

## 2020-06-23 PROCEDURE — 1126F PR PAIN SEVERITY QUANTIFIED, NO PAIN PRESENT: ICD-10-PCS | Mod: S$GLB,,, | Performed by: INTERNAL MEDICINE

## 2020-06-23 PROCEDURE — 1159F MED LIST DOCD IN RCRD: CPT | Mod: S$GLB,,, | Performed by: INTERNAL MEDICINE

## 2020-06-23 PROCEDURE — 99999 PR PBB SHADOW E&M-EST. PATIENT-LVL III: ICD-10-PCS | Mod: PBBFAC,,, | Performed by: INTERNAL MEDICINE

## 2020-06-23 PROCEDURE — 1159F PR MEDICATION LIST DOCUMENTED IN MEDICAL RECORD: ICD-10-PCS | Mod: S$GLB,,, | Performed by: INTERNAL MEDICINE

## 2020-06-23 PROCEDURE — 3077F SYST BP >= 140 MM HG: CPT | Mod: CPTII,S$GLB,, | Performed by: INTERNAL MEDICINE

## 2020-06-23 PROCEDURE — 1101F PR PT FALLS ASSESS DOC 0-1 FALLS W/OUT INJ PAST YR: ICD-10-PCS | Mod: CPTII,S$GLB,, | Performed by: INTERNAL MEDICINE

## 2020-06-23 PROCEDURE — 99214 PR OFFICE/OUTPT VISIT, EST, LEVL IV, 30-39 MIN: ICD-10-PCS | Mod: S$GLB,,, | Performed by: INTERNAL MEDICINE

## 2020-06-23 PROCEDURE — 1101F PT FALLS ASSESS-DOCD LE1/YR: CPT | Mod: CPTII,S$GLB,, | Performed by: INTERNAL MEDICINE

## 2020-06-23 PROCEDURE — 3079F PR MOST RECENT DIASTOLIC BLOOD PRESSURE 80-89 MM HG: ICD-10-PCS | Mod: CPTII,S$GLB,, | Performed by: INTERNAL MEDICINE

## 2020-06-23 PROCEDURE — 93000 EKG 12-LEAD: ICD-10-PCS | Mod: S$GLB,,, | Performed by: INTERNAL MEDICINE

## 2020-06-23 RX ORDER — FUROSEMIDE 40 MG/1
40 TABLET ORAL DAILY
Qty: 90 TABLET | Refills: 3 | Status: SHIPPED | OUTPATIENT
Start: 2020-06-23 | End: 2020-12-08 | Stop reason: SDUPTHER

## 2020-06-23 RX ORDER — POTASSIUM CHLORIDE 750 MG/1
10 TABLET, EXTENDED RELEASE ORAL DAILY
Qty: 90 TABLET | Refills: 3 | Status: SHIPPED | OUTPATIENT
Start: 2020-06-23

## 2020-06-23 NOTE — PROGRESS NOTES
"Subjective:    Patient ID:  Ella Baron is a 74 y.o. female who presents for follow-up of Coronary Artery Disease      HPI     STEMI 8/31/19 - medical Rx due to penetrating aortic ulcer, REMY to LAD 10/11/19, Ischemic CM EF 25%. LV thrombus on xarelto, HTN, HLD, former tobacco abuse     [place] [facility] [place]  74 year old female with PMH CAD, Ischemic cardiomyopathy (EF 25%, wearing life vest) with LV apical thrombus (on Xarelto), congenital absence of left ulnar artery, penetrating descending aortic ulcer, HTN, former tobacco use here for hospital follow-up of STEMI. The patient was in her usual state of health until labor day long weekend, when she was awakened at 4:00 AM on 8/31/19 with a "cramping" sensation across her chest and back, with paresthesia of both arms. She sat in her recliner, took 2 advil and this sensation subsided within 20-30 minutes. The next day she had the same episode after eating scrambled eggs, also resolved within 20-30 minutes of taking 2 advil. The third and final episode happened the following day, again after food intake, lasting 20-30 minutes. The following day she had no symptoms but saw her PCP who found STEMI on EKG, she was transferred to the ER, found to have a penetrating aortic ulcer, was [place] [facility] [place] and penetrating aortic ulcer was not intervened upon and she was outside of the time frame for angiography; CAD was managed medically. She was found to have a reduced LVEF of 25% with LAD territory WMA and LAD territory fixed defect on PET. She is scheduled for PET viability, follow-up with vascular surgery and Dr. Mederos who will become her primary cardiologist.     Other than the three episodes of chest/back "cramping" mentioned above, the patient has been completely asymptomatic. She denies GANT (NYHA I) and continues to perform all ADLs as before, denies orthopnea or PND but has noticed mild edema which she attributes to norvasc. She denies symptoms of angina, syncope or life " vest shocks and has had no claudication symptoms or skin ulceration. She is a former smoker, quit 30 days ago. No family history of CAD or sudden cardiac death. She is on triple therapy with ASA, plavix and Xarelto and has not had any bleeding issues.     The patient is free of angina s/p medically treated STEMI with no revascularization (asymptomatic and out of STEMI window upon arrival to [facility] [place]).  Continue DAPT with ASA, plavix, high intensity statin, BB (metoprolol) and ARB (losartan)  PET with 40% LV myocardium fixed defect in LAD territory; viability study scheduled  Plan for LHC +/- PCI following viability study - no left radial access due to congenital absence of left ulnar artery  Will schedule cardiac rehab following LHC +/- PCI     1. Cardiac catheterization with probable PCI.   2. Antiplatelets: ASA, plavix  3. Access: Right radial  4. Catheters: Bayron. No LVEDP (thrombus)  5. Pt is a REMY candidate and understands the importance of taking plavix for at least one year, understands that in case of receiving a drug coated stent the failure to comply with dual anti-platelet therapy is likely to result in stent clothing, heart attack and death.   6. The risks, benefits, and alternatives of coronary vascular angiography and possible intervention were discussed with the patient. All questions were answered and informed consent was obtained. I had a detailed discussion with the patient regarding risk of stroke, MI, bleeding access site complications including limb loss, allergy, kidney failure including dialysis and death.  7. The patient understands the risks and benefits and wishes to go ahead with the procedure.  8. All patient's questions were answered     Ischemic cardiomyopathy with LV thrombus  NYHA I, LVEF 25%  Wearing life vest  Continue losartan, metoprolol  Educated on importance of fluid restriction and low sodium diet  Continue Xarelto for a total fo 3 months; we discussed the alternative of using  warfarin due to cost, however considering the requirement for consistent diet and therapeutic monitoring, the patient chose to remain on DOAC     Penetrating aortic ulcer  Scheduled for follow-up with vascular surgery     HTN  118/68  Continue norvasc, metoprolol, losartan     Tobacco abuse  Quit smoking 30 days ago    Admitted 10/11/19  Hospital Course:   Patient presented for outpatient coronary angiogram which went without complication. Coronary angiogram revealed mid LAD 80% stenosis proximal to a large diagonal branch. She underwent successful PCI of her mid LAD with a 3.5 x 15 mm REMY. See full cath report in Epic for details. Hemostasis of patient's R Radial access site was achieved with VascBand. Patient was monitored per post-cath protocol, and her R radial access site was c/d/i with no hematoma. Patient was able to ambulate without difficulty. She was feeling well and anticipating discharge home today.      Outpatient Plan:  - Cardiac rehab referral  - Continue medical management  - Risk factor reduction  - Plavix for at least 1 year and ASA 81 mg indefinitely  - Follow-up with outpatient cardiologist (Dr. Mederos) as scheduled     [facility] 10/11/19  · Successful PCI of 80% mid LAD with 3.5X15 REMY      Echo 1/20/20  · Concentric left ventricular hypertrophy.  · Moderately decreased left ventricular systolic function. The estimated ejection fraction is 35%.  · Grade I (mild) left ventricular diastolic dysfunction consistent with impaired relaxation.  · Local segmental wall motion abnormalities.  · Normal right ventricular systolic function.  · Mild left atrial enlargement.  · Moderate aortic regurgitation.  · Mild mitral regurgitation.  · No pulmonary hypertension present.  · Normal central venous pressure (3 mmHg).  · The estimated PA systolic pressure is 14 mmHg.           Echo [facility] 12/17/19    Technically difficult study.      Contrast was administered and successfully enabled adequate endocardial definition.      Mildly increased left ventricular cavity size.     Severely decreased left ventricular systolic function.     Left ventricular ejection fraction is estimated at 30%.     No apical thrombus noted.     Mildly increased left atrial size.     Mild mitral annular calcification.     Mild-moderate mitral valve regurgitation.     Mild aortic valve regurgitation.     Trace tricuspid valve regurgitation.      Echo 9/4/19  · Severely decreased left ventricular systolic function. The estimated ejection fraction is 25%  · Local segmental wall motion abnormalities with an aneurysmal apex and layered thrombus int he apex  · Eccentric left ventricular hypertrophy.  · Moderate left atrial enlargement.  · Grade I (mild) left ventricular diastolic dysfunction consistent with impaired relaxation and elevated LVEDP  · Normal right ventricular systolic function.  · Mild aortic regurgitation.  · Mild mitral regurgitation.  · Mild tricuspid regurgitation.  · The estimated PA systolic pressure is 33 mm Hg  · Normal central venous pressure (3 mm Hg).     PET viability 10/8/19    There is a matched defect present in the mid to distal anteroseptal and the apical walls comprising of ~30% of myocardium in the typical distribution of the LAD. This is non-viable.     10/15/19 Wearing Lifevest  Denies CP or SOB  Back to work  Has not yet started cardiac rehab  Surprisingly asymptomatic after recent large anterior STEMI  Cardiac rehab at   OV 3 months with repeat echo - will recommend AICD if EF < 35%     12/18/19 Admitted to  with CHF this month  GANT still present but not as severe       1/21/20 Doing well with cardiac rehab  Denies CP or SOB  EF now at 35% - discussed AICD - she wants to hold off  Stop Lifevest  Thrombus not seen on echo - will continue with xarelto for now  OV 3 months with BNP, BMP    6/23/20 Recently having more LE edema - takes lasix 20 qd  Denies CP, mild GANT  EKG NSR PRWP      Review of Systems   Constitution:  Negative for decreased appetite.   HENT: Negative for ear discharge.    Eyes: Negative for blurred vision.   Respiratory: Negative for hemoptysis.    Endocrine: Negative for polyphagia.   Hematologic/Lymphatic: Negative for adenopathy.   Skin: Negative for color change.   Musculoskeletal: Negative for joint swelling.   Genitourinary: Negative for bladder incontinence.   Neurological: Negative for brief paralysis.   Psychiatric/Behavioral: Negative for hallucinations.   Allergic/Immunologic: Negative for hives.        Objective:    Physical Exam   Constitutional: She is oriented to person, place, and time. She appears well-developed and well-nourished.   HENT:   Head: Normocephalic and atraumatic.   Eyes: Pupils are equal, round, and reactive to light. Conjunctivae are normal.   Neck: Normal range of motion. Neck supple.   Cardiovascular: Normal rate, normal heart sounds and intact distal pulses.   Pulmonary/Chest: Effort normal and breath sounds normal.   Abdominal: Soft. Bowel sounds are normal.   Musculoskeletal: Normal range of motion.   Neurological: She is alert and oriented to person, place, and time.   Skin: Skin is warm and dry.         Assessment:       1. Ischemic cardiomyopathy    2. Coronary artery disease involving native coronary artery of native heart without angina pectoris    3. Left ventricular thrombosis    4. Acute on chronic combined systolic and diastolic CHF (congestive heart failure)    5. Essential hypertension    6. Penetrating atherosclerotic ulcer of aorta         Plan:       Increase lasix 40 qd, Add KCL 10 meq qd  OV 1 month with BNP, BMP  Repeat echo next year

## 2020-07-14 ENCOUNTER — LAB VISIT (OUTPATIENT)
Dept: LAB | Facility: HOSPITAL | Age: 75
End: 2020-07-14
Attending: INTERNAL MEDICINE
Payer: COMMERCIAL

## 2020-07-14 DIAGNOSIS — I50.43 ACUTE ON CHRONIC COMBINED SYSTOLIC AND DIASTOLIC CHF (CONGESTIVE HEART FAILURE): ICD-10-CM

## 2020-07-14 DIAGNOSIS — I10 ESSENTIAL HYPERTENSION: ICD-10-CM

## 2020-07-14 DIAGNOSIS — I25.5 ISCHEMIC CARDIOMYOPATHY: ICD-10-CM

## 2020-07-14 DIAGNOSIS — I25.10 CORONARY ARTERY DISEASE INVOLVING NATIVE CORONARY ARTERY OF NATIVE HEART WITHOUT ANGINA PECTORIS: ICD-10-CM

## 2020-07-14 DIAGNOSIS — I51.3 LEFT VENTRICULAR THROMBOSIS: ICD-10-CM

## 2020-07-14 DIAGNOSIS — I71.9 PENETRATING ATHEROSCLEROTIC ULCER OF AORTA: ICD-10-CM

## 2020-07-14 LAB
ANION GAP SERPL CALC-SCNC: 7 MMOL/L (ref 8–16)
BUN SERPL-MCNC: 15 MG/DL (ref 8–23)
CALCIUM SERPL-MCNC: 8.9 MG/DL (ref 8.7–10.5)
CHLORIDE SERPL-SCNC: 105 MMOL/L (ref 95–110)
CO2 SERPL-SCNC: 28 MMOL/L (ref 23–29)
CREAT SERPL-MCNC: 1.2 MG/DL (ref 0.5–1.4)
EST. GFR  (AFRICAN AMERICAN): 51.1 ML/MIN/1.73 M^2
EST. GFR  (NON AFRICAN AMERICAN): 44.3 ML/MIN/1.73 M^2
GLUCOSE SERPL-MCNC: 108 MG/DL (ref 70–110)
POTASSIUM SERPL-SCNC: 4.1 MMOL/L (ref 3.5–5.1)
SODIUM SERPL-SCNC: 140 MMOL/L (ref 136–145)

## 2020-07-14 PROCEDURE — 36415 COLL VENOUS BLD VENIPUNCTURE: CPT | Mod: PO

## 2020-07-14 PROCEDURE — 80048 BASIC METABOLIC PNL TOTAL CA: CPT

## 2020-07-22 ENCOUNTER — LAB VISIT (OUTPATIENT)
Dept: LAB | Facility: HOSPITAL | Age: 75
End: 2020-07-22
Attending: INTERNAL MEDICINE
Payer: COMMERCIAL

## 2020-07-22 DIAGNOSIS — I10 ESSENTIAL HYPERTENSION: ICD-10-CM

## 2020-07-22 DIAGNOSIS — I25.10 CORONARY ARTERY DISEASE INVOLVING NATIVE CORONARY ARTERY OF NATIVE HEART WITHOUT ANGINA PECTORIS: ICD-10-CM

## 2020-07-22 DIAGNOSIS — I71.9 PENETRATING ATHEROSCLEROTIC ULCER OF AORTA: ICD-10-CM

## 2020-07-22 DIAGNOSIS — I50.43 ACUTE ON CHRONIC COMBINED SYSTOLIC AND DIASTOLIC CHF (CONGESTIVE HEART FAILURE): ICD-10-CM

## 2020-07-22 DIAGNOSIS — I25.5 ISCHEMIC CARDIOMYOPATHY: ICD-10-CM

## 2020-07-22 DIAGNOSIS — I51.3 LEFT VENTRICULAR THROMBOSIS: ICD-10-CM

## 2020-07-22 PROCEDURE — 83880 ASSAY OF NATRIURETIC PEPTIDE: CPT

## 2020-07-22 PROCEDURE — 36415 COLL VENOUS BLD VENIPUNCTURE: CPT | Mod: PO

## 2020-07-23 LAB — BNP SERPL-MCNC: 357 PG/ML (ref 0–99)

## 2020-07-28 NOTE — Clinical Note
Catheter is inserted into the ostium   left main.  Implemented All Universal Safety Interventions:  [place] to call system. Call bell, personal items and telephone within reach. Instruct patient to call for assistance. Room bathroom lighting operational. Non-slip footwear when patient is off stretcher. Physically safe environment: no spills, clutter or unnecessary equipment. Stretcher in lowest position, wheels locked, appropriate side rails in place.

## 2020-08-18 ENCOUNTER — OFFICE VISIT (OUTPATIENT)
Dept: CARDIOLOGY | Facility: CLINIC | Age: 75
End: 2020-08-18
Payer: COMMERCIAL

## 2020-08-18 VITALS
BODY MASS INDEX: 28.25 KG/M2 | WEIGHT: 180 LBS | DIASTOLIC BLOOD PRESSURE: 64 MMHG | SYSTOLIC BLOOD PRESSURE: 122 MMHG | OXYGEN SATURATION: 95 % | HEIGHT: 67 IN | HEART RATE: 88 BPM

## 2020-08-18 DIAGNOSIS — I50.43 ACUTE ON CHRONIC COMBINED SYSTOLIC AND DIASTOLIC CHF (CONGESTIVE HEART FAILURE): ICD-10-CM

## 2020-08-18 DIAGNOSIS — I71.9 PENETRATING ATHEROSCLEROTIC ULCER OF AORTA: ICD-10-CM

## 2020-08-18 DIAGNOSIS — I25.10 CORONARY ARTERY DISEASE INVOLVING NATIVE CORONARY ARTERY OF NATIVE HEART WITHOUT ANGINA PECTORIS: Primary | ICD-10-CM

## 2020-08-18 DIAGNOSIS — I51.3 LEFT VENTRICULAR THROMBOSIS: ICD-10-CM

## 2020-08-18 DIAGNOSIS — I25.5 ISCHEMIC CARDIOMYOPATHY: ICD-10-CM

## 2020-08-18 PROCEDURE — 99999 PR PBB SHADOW E&M-EST. PATIENT-LVL IV: ICD-10-PCS | Mod: PBBFAC,,, | Performed by: INTERNAL MEDICINE

## 2020-08-18 PROCEDURE — 1101F PT FALLS ASSESS-DOCD LE1/YR: CPT | Mod: CPTII,S$GLB,, | Performed by: INTERNAL MEDICINE

## 2020-08-18 PROCEDURE — 1159F MED LIST DOCD IN RCRD: CPT | Mod: S$GLB,,, | Performed by: INTERNAL MEDICINE

## 2020-08-18 PROCEDURE — 99213 PR OFFICE/OUTPT VISIT, EST, LEVL III, 20-29 MIN: ICD-10-PCS | Mod: S$GLB,,, | Performed by: INTERNAL MEDICINE

## 2020-08-18 PROCEDURE — 3074F SYST BP LT 130 MM HG: CPT | Mod: CPTII,S$GLB,, | Performed by: INTERNAL MEDICINE

## 2020-08-18 PROCEDURE — 1126F AMNT PAIN NOTED NONE PRSNT: CPT | Mod: S$GLB,,, | Performed by: INTERNAL MEDICINE

## 2020-08-18 PROCEDURE — 1101F PR PT FALLS ASSESS DOC 0-1 FALLS W/OUT INJ PAST YR: ICD-10-PCS | Mod: CPTII,S$GLB,, | Performed by: INTERNAL MEDICINE

## 2020-08-18 PROCEDURE — 1159F PR MEDICATION LIST DOCUMENTED IN MEDICAL RECORD: ICD-10-PCS | Mod: S$GLB,,, | Performed by: INTERNAL MEDICINE

## 2020-08-18 PROCEDURE — 3078F PR MOST RECENT DIASTOLIC BLOOD PRESSURE < 80 MM HG: ICD-10-PCS | Mod: CPTII,S$GLB,, | Performed by: INTERNAL MEDICINE

## 2020-08-18 PROCEDURE — 99213 OFFICE O/P EST LOW 20 MIN: CPT | Mod: S$GLB,,, | Performed by: INTERNAL MEDICINE

## 2020-08-18 PROCEDURE — 3074F PR MOST RECENT SYSTOLIC BLOOD PRESSURE < 130 MM HG: ICD-10-PCS | Mod: CPTII,S$GLB,, | Performed by: INTERNAL MEDICINE

## 2020-08-18 PROCEDURE — 1126F PR PAIN SEVERITY QUANTIFIED, NO PAIN PRESENT: ICD-10-PCS | Mod: S$GLB,,, | Performed by: INTERNAL MEDICINE

## 2020-08-18 PROCEDURE — 99999 PR PBB SHADOW E&M-EST. PATIENT-LVL IV: CPT | Mod: PBBFAC,,, | Performed by: INTERNAL MEDICINE

## 2020-08-18 PROCEDURE — 3078F DIAST BP <80 MM HG: CPT | Mod: CPTII,S$GLB,, | Performed by: INTERNAL MEDICINE

## 2020-08-18 NOTE — PROGRESS NOTES
"Subjective:    Patient ID:  Ella Baron is a 75 y.o. female who presents for follow-up of Hypertension      HPI     STEMI 8/31/19 - medical Rx due to penetrating aortic ulcer, REMY to LAD 10/11/19, Ischemic CM EF 25%. LV thrombus on xarelto, HTN, HLD, former tobacco abuse     Followed at [facility] [place]  74 year old female with PMH CAD, Ischemic cardiomyopathy (EF 25%, wearing life vest) with LV apical thrombus (on Xarelto), congenital absence of left ulnar artery, penetrating descending aortic ulcer, HTN, former tobacco use here for hospital follow-up of STEMI. The patient was in her usual state of health until labor day long weekend, when she was awakened at 4:00 AM on 8/31/19 with a "cramping" sensation across her chest and back, with paresthesia of both arms. She sat in her recliner, took 2 advil and this sensation subsided within 20-30 minutes. The next day she had the same episode after eating scrambled eggs, also resolved within 20-30 minutes of taking 2 advil. The third and final episode happened the following day, again after food intake, lasting 20-30 minutes. The following day she had no symptoms but saw her PCP who found STEMI on EKG, she was transferred to the ER, found to have a penetrating aortic ulcer, was transferred to [facility] [place] and penetrating aortic ulcer was not intervened upon and she was outside of the time frame for angiography; CAD was managed medically. She was found to have a reduced LVEF of 25% with LAD territory WMA and LAD territory fixed defect on PET. She is scheduled for PET viability, follow-up with vascular surgery and Dr. Mederos who will become her primary cardiologist.     Other than the three episodes of chest/back "cramping" mentioned above, the patient has been completely asymptomatic. She denies GANT (NYHA I) and continues to perform all ADLs as before, denies orthopnea or PND but has noticed mild edema which she attributes to norvasc. She denies symptoms of angina, syncope or life vest shocks " and has had no claudication symptoms or skin ulceration. She is a former smoker, quit 30 days ago. No family history of CAD or sudden cardiac death. She is on triple therapy with ASA, plavix and Xarelto and has not had any bleeding issues.     The patient is free of angina s/p medically treated STEMI with no revascularization (asymptomatic and [place] [facility] [place]  Continue DAPT with ASA, plavix, high intensity statin, BB (metoprolol) and ARB (losartan)  PET with 40% LV myocardium fixed defect in LAD territory; viability study scheduled  Plan for LHC +/- PCI following viability study - no left radial access due to congenital absence of left ulnar artery  Will schedule cardiac rehab following LHC +/- PCI     1. Cardiac catheterization with probable PCI.   2. Antiplatelets: ASA, plavix  3. Access: Right radial  4. Catheters: Bayron. No LVEDP (thrombus)  5. Pt is a REMY candidate and understands the importance of taking plavix for at least one year, understands that in case of receiving a drug coated stent the failure to comply with dual anti-platelet therapy is likely to result in stent clothing, heart attack and death.   6. The risks, benefits, and alternatives of coronary vascular angiography and possible intervention were discussed with the patient. All questions were answered and informed consent was obtained. I had a detailed discussion with the patient regarding risk of stroke, MI, bleeding access site complications including limb loss, allergy, kidney failure including dialysis and death.  7. The patient understands the risks and benefits and wishes to go ahead with the procedure.  8. All patient's questions were answered     Ischemic cardiomyopathy with LV thrombus  NYHA I, LVEF 25%  Wearing life vest  Continue losartan, metoprolol  Educated on importance of fluid restriction and low sodium diet  Continue Xarelto for a total fo 3 months; we discussed the alternative of using warfarin due to  cost, however considering the requirement for consistent diet and therapeutic monitoring, the patient chose to remain on DOAC     Penetrating aortic ulcer  Scheduled for follow-up with vascular surgery     HTN  118/68  Continue norvasc, metoprolol, losartan     Tobacco abuse  Quit smoking 30 days ago    Admitted 10/11/19  Hospital Course:   Patient presented for outpatient coronary angiogram which went without complication. Coronary angiogram revealed mid LAD 80% stenosis proximal to a large diagonal branch. She underwent successful PCI of her mid LAD with a 3.5 x 15 mm REMY. See full cath report in Epic for details. Hemostasis of patient's R Radial access site was achieved with VascBand. Patient was monitored per post-cath protocol, and her R radial access site was c/d/i with no hematoma. Patient was able to ambulate without difficulty. She was feeling well and anticipating discharge home today.      Outpatient Plan:  - Cardiac rehab referral  - Continue medical management  - Risk factor reduction  - Plavix for at least 1 year and ASA 81 mg indefinitely  - Follow-up with outpatient cardiologist (Dr. Mederos) as scheduled     [facility] 10/11/19  · Successful PCI of 80% mid LAD with 3.5X15 REMY      Echo 1/20/20  · Concentric left ventricular hypertrophy.  · Moderately decreased left ventricular systolic function. The estimated ejection fraction is 35%.  · Grade I (mild) left ventricular diastolic dysfunction consistent with impaired relaxation.  · Local segmental wall motion abnormalities.  · Normal right ventricular systolic function.  · Mild left atrial enlargement.  · Moderate aortic regurgitation.  · Mild mitral regurgitation.  · No pulmonary hypertension present.  · Normal central venous pressure (3 mmHg).  · The estimated PA systolic pressure is 14 mmHg.           Echo [facility] 12/17/19    Technically difficult study.      Contrast was administered and successfully enabled adequate endocardial definition.     Mildly  increased left ventricular cavity size.     Severely decreased left ventricular systolic function.     Left ventricular ejection fraction is estimated at 30%.     No apical thrombus noted.     Mildly increased left atrial size.     Mild mitral annular calcification.     Mild-moderate mitral valve regurgitation.     Mild aortic valve regurgitation.     Trace tricuspid valve regurgitation.      Echo 9/4/19  · Severely decreased left ventricular systolic function. The estimated ejection fraction is 25%  · Local segmental wall motion abnormalities with an aneurysmal apex and layered thrombus int he apex  · Eccentric left ventricular hypertrophy.  · Moderate left atrial enlargement.  · Grade I (mild) left ventricular diastolic dysfunction consistent with impaired relaxation and elevated LVEDP  · Normal right ventricular systolic function.  · Mild aortic regurgitation.  · Mild mitral regurgitation.  · Mild tricuspid regurgitation.  · The estimated PA systolic pressure is 33 mm Hg  · Normal central venous pressure (3 mm Hg).     PET viability 10/8/19    There is a matched defect present in the mid to distal anteroseptal and the apical walls comprising of ~30% of myocardium in the typical distribution of the LAD. This is non-viable.     10/15/19 Wearing Lifevest  Denies CP or SOB  Back to work  Has not yet started cardiac rehab  Surprisingly asymptomatic after recent large anterior STEMI  Cardiac rehab at   OV 3 months with repeat echo - will recommend AICD if EF < 35%     12/18/19 Admitted to  with CHF this month  GANT still present but not as severe        1/21/20 Doing well with cardiac rehab  Denies CP or SOB  EF now at 35% - discussed AICD - she wants to hold off  Stop Lifevest  Thrombus not seen on echo - will continue with xarelto for now  OV 3 months with BNP, BMP     6/23/20 Recently having more LE edema - takes lasix 20 qd  Denies CP, mild GANT  EKG NSR PRWP  Increase lasix 40 qd, Add KCL 10 meq qd  OV 1 month  with BNP, BMP  Repeat echo next year    Labs 7/14/20  K 4.1  Cr 1.2   (7/22/20) down from 445    8/18/20 Still with some LE edema - decreased her lasix to 20 qd over concerns about renal function. GANT improved some. Denies CP    Review of Systems   Constitution: Negative for decreased appetite.   HENT: Negative for ear discharge.    Eyes: Negative for blurred vision.   Respiratory: Negative for hemoptysis.    Endocrine: Negative for polyphagia.   Hematologic/Lymphatic: Negative for adenopathy.   Skin: Negative for color change.   Musculoskeletal: Negative for joint swelling.   Genitourinary: Negative for bladder incontinence.   Neurological: Negative for brief paralysis.   Psychiatric/Behavioral: Negative for hallucinations.   Allergic/Immunologic: Negative for hives.        Objective:    Physical Exam   Constitutional: She is oriented to person, place, and time. She appears well-developed and well-nourished.   HENT:   Head: Normocephalic and atraumatic.   Eyes: Pupils are equal, round, and reactive to light. Conjunctivae are normal.   Neck: Normal range of motion. Neck supple.   Cardiovascular: Normal rate, normal heart sounds and intact distal pulses.   Pulmonary/Chest: Effort normal and breath sounds normal.   Abdominal: Soft. Bowel sounds are normal.   Musculoskeletal: Normal range of motion.   Neurological: She is alert and oriented to person, place, and time.   Skin: Skin is warm and dry.         Assessment:       1. Coronary artery disease involving native coronary artery of native heart without angina pectoris    2. Penetrating atherosclerotic ulcer of aorta    3. Acute on chronic combined systolic and diastolic CHF (congestive heart failure)    4. Left ventricular thrombosis    5. Ischemic cardiomyopathy         Plan:       Ok to increase lasix to 40 qd until LE edema resolved  OV 3 months with echo to look at EF and LV thrombus, BNP, BMP

## 2020-11-09 ENCOUNTER — TELEPHONE (OUTPATIENT)
Dept: CARDIOLOGY | Facility: CLINIC | Age: 75
End: 2020-11-09

## 2020-11-09 NOTE — TELEPHONE ENCOUNTER
----- Message from Muna Harmon MA sent at 11/9/2020  8:29 AM CST -----  Regarding: FW: self  Pt had a echo done in January, want to know do she need another one prior to seeing you or can she get one next year?  ----- Message -----  From: Crys Walton  Sent: 11/9/2020   8:22 AM CST  To: [facility] Staff  Subject: self                                             Type: Patient Call Back    Who called: self    What is the request in detail: pt stated she had her Echo already and she is not sure what test is needed before her visit. Please call to discuss    Can the clinic reply by [facility]? no    Would the patient rather a call back or a response via [facility]? Call     Best call back number: [phone]

## 2020-11-20 ENCOUNTER — HOSPITAL ENCOUNTER (OUTPATIENT)
Dept: CARDIOLOGY | Facility: HOSPITAL | Age: 75
Discharge: HOME | End: 2020-11-20
Attending: INTERNAL MEDICINE
Payer: COMMERCIAL

## 2020-11-20 DIAGNOSIS — I51.3 LEFT VENTRICULAR THROMBOSIS: ICD-10-CM

## 2020-11-20 DIAGNOSIS — I25.10 CORONARY ARTERY DISEASE INVOLVING NATIVE CORONARY ARTERY OF NATIVE HEART WITHOUT ANGINA PECTORIS: ICD-10-CM

## 2020-11-20 DIAGNOSIS — I25.5 ISCHEMIC CARDIOMYOPATHY: ICD-10-CM

## 2020-11-20 DIAGNOSIS — I71.9 PENETRATING ATHEROSCLEROTIC ULCER OF AORTA: ICD-10-CM

## 2020-11-20 DIAGNOSIS — I50.43 ACUTE ON CHRONIC COMBINED SYSTOLIC AND DIASTOLIC CHF (CONGESTIVE HEART FAILURE): ICD-10-CM

## 2020-11-20 LAB
AORTIC ROOT ANNULUS: 2.5 CM
AORTIC VALVE CUSP SEPERATION: 2.02 CM
ASCENDING AORTA: 2.37 CM
AV INDEX (PROSTH): 0.7
AV MEAN GRADIENT: 3 MMHG
AV PEAK GRADIENT: 6 MMHG
AV VALVE AREA: 2.75 CM2
AV VELOCITY RATIO: 0.73
CV ECHO LV RWT: 0.33 CM
DOP CALC AO PEAK VEL: 1.22 M/S
DOP CALC AO VTI: 22.2 CM
DOP CALC LVOT AREA: 3.9 CM2
DOP CALC LVOT DIAMETER: 2.23 CM
DOP CALC LVOT PEAK VEL: 0.89 M/S
DOP CALC LVOT STROKE VOLUME: 61.09 CM3
DOP CALCLVOT PEAK VEL VTI: 15.65 CM
E WAVE DECELERATION TIME: 167.81 MSEC
E/A RATIO: 0.72
E/E' RATIO: 18.6 M/S
ECHO LV POSTERIOR WALL: 0.99 CM (ref 0.6–1.1)
FRACTIONAL SHORTENING: 16 % (ref 28–44)
INTERVENTRICULAR SEPTUM: 1.05 CM (ref 0.6–1.1)
IVRT: 92.27 MSEC
LA MAJOR: 4.21 CM
LEFT ATRIUM SIZE: 3.79 CM
LEFT INTERNAL DIMENSION IN SYSTOLE: 5.01 CM (ref 2.1–4)
LEFT VENTRICLE DIASTOLIC VOLUME: 177.08 ML
LEFT VENTRICLE SYSTOLIC VOLUME: 118.9 ML
LEFT VENTRICULAR INTERNAL DIMENSION IN DIASTOLE: 5.96 CM (ref 3.5–6)
LEFT VENTRICULAR MASS: 250.44 G
LV LATERAL E/E' RATIO: 18.6 M/S
LV SEPTAL E/E' RATIO: 18.6 M/S
MV PEAK A VEL: 1.3 M/S
MV PEAK E VEL: 0.93 M/S
MV STENOSIS PRESSURE HALF TIME: 48.67 MS
MV VALVE AREA P 1/2 METHOD: 4.52 CM2
PISA TR MAX VEL: 2.55 M/S
PULM VEIN S/D RATIO: 1.34
PV PEAK D VEL: 0.71 M/S
PV PEAK S VEL: 0.95 M/S
PV PEAK VELOCITY: 0.73 CM/S
RA MAJOR: 3.94 CM
RA WIDTH: 3.64 CM
RIGHT VENTRICULAR END-DIASTOLIC DIMENSION: 3.27 CM
SINUS: 2.6 CM
STJ: 2.07 CM
TDI LATERAL: 0.05 M/S
TDI SEPTAL: 0.05 M/S
TDI: 0.05 M/S
TR MAX PG: 26 MMHG
TRICUSPID ANNULAR PLANE SYSTOLIC EXCURSION: 2.05 CM

## 2020-11-20 PROCEDURE — 63600175 PHARM REV CODE 636 W HCPCS: Performed by: INTERNAL MEDICINE

## 2020-11-20 PROCEDURE — 93306 TTE W/DOPPLER COMPLETE: CPT | Mod: 26,,, | Performed by: INTERNAL MEDICINE

## 2020-11-20 PROCEDURE — 93306 ECHO (CUPID ONLY): ICD-10-PCS | Mod: 26,,, | Performed by: INTERNAL MEDICINE

## 2020-11-20 PROCEDURE — C8929 TTE W OR WO FOL WCON,DOPPLER: HCPCS

## 2020-11-20 RX ADMIN — HUMAN ALBUMIN MICROSPHERES AND PERFLUTREN 0.11 MG: 10; .22 INJECTION, SOLUTION INTRAVENOUS at 12:11

## 2020-12-03 ENCOUNTER — PATIENT MESSAGE (OUTPATIENT)
Dept: CARDIOLOGY | Facility: CLINIC | Age: 75
End: 2020-12-03

## 2020-12-08 ENCOUNTER — OFFICE VISIT (OUTPATIENT)
Dept: CARDIOLOGY | Facility: CLINIC | Age: 75
End: 2020-12-08
Payer: COMMERCIAL

## 2020-12-08 VITALS
HEIGHT: 66 IN | HEART RATE: 75 BPM | BODY MASS INDEX: 29.16 KG/M2 | WEIGHT: 181.44 LBS | DIASTOLIC BLOOD PRESSURE: 74 MMHG | OXYGEN SATURATION: 96 % | SYSTOLIC BLOOD PRESSURE: 136 MMHG

## 2020-12-08 DIAGNOSIS — I50.43 ACUTE ON CHRONIC COMBINED SYSTOLIC AND DIASTOLIC CHF (CONGESTIVE HEART FAILURE): Primary | ICD-10-CM

## 2020-12-08 DIAGNOSIS — R93.5 ABNORMAL CT OF THE ABDOMEN: ICD-10-CM

## 2020-12-08 DIAGNOSIS — I25.5 ISCHEMIC CARDIOMYOPATHY: ICD-10-CM

## 2020-12-08 DIAGNOSIS — I25.10 CORONARY ARTERY DISEASE INVOLVING NATIVE CORONARY ARTERY OF NATIVE HEART WITHOUT ANGINA PECTORIS: ICD-10-CM

## 2020-12-08 PROCEDURE — 3078F PR MOST RECENT DIASTOLIC BLOOD PRESSURE < 80 MM HG: ICD-10-PCS | Mod: CPTII,S$GLB,, | Performed by: INTERNAL MEDICINE

## 2020-12-08 PROCEDURE — 1101F PT FALLS ASSESS-DOCD LE1/YR: CPT | Mod: CPTII,S$GLB,, | Performed by: INTERNAL MEDICINE

## 2020-12-08 PROCEDURE — 99214 OFFICE O/P EST MOD 30 MIN: CPT | Mod: S$GLB,,, | Performed by: INTERNAL MEDICINE

## 2020-12-08 PROCEDURE — 1159F PR MEDICATION LIST DOCUMENTED IN MEDICAL RECORD: ICD-10-PCS | Mod: S$GLB,,, | Performed by: INTERNAL MEDICINE

## 2020-12-08 PROCEDURE — 3288F FALL RISK ASSESSMENT DOCD: CPT | Mod: CPTII,S$GLB,, | Performed by: INTERNAL MEDICINE

## 2020-12-08 PROCEDURE — 3078F DIAST BP <80 MM HG: CPT | Mod: CPTII,S$GLB,, | Performed by: INTERNAL MEDICINE

## 2020-12-08 PROCEDURE — 1101F PR PT FALLS ASSESS DOC 0-1 FALLS W/OUT INJ PAST YR: ICD-10-PCS | Mod: CPTII,S$GLB,, | Performed by: INTERNAL MEDICINE

## 2020-12-08 PROCEDURE — 1126F AMNT PAIN NOTED NONE PRSNT: CPT | Mod: S$GLB,,, | Performed by: INTERNAL MEDICINE

## 2020-12-08 PROCEDURE — 3075F SYST BP GE 130 - 139MM HG: CPT | Mod: CPTII,S$GLB,, | Performed by: INTERNAL MEDICINE

## 2020-12-08 PROCEDURE — 99999 PR PBB SHADOW E&M-EST. PATIENT-LVL IV: ICD-10-PCS | Mod: PBBFAC,,, | Performed by: INTERNAL MEDICINE

## 2020-12-08 PROCEDURE — 3075F PR MOST RECENT SYSTOLIC BLOOD PRESS GE 130-139MM HG: ICD-10-PCS | Mod: CPTII,S$GLB,, | Performed by: INTERNAL MEDICINE

## 2020-12-08 PROCEDURE — 99999 PR PBB SHADOW E&M-EST. PATIENT-LVL IV: CPT | Mod: PBBFAC,,, | Performed by: INTERNAL MEDICINE

## 2020-12-08 PROCEDURE — 99214 PR OFFICE/OUTPT VISIT, EST, LEVL IV, 30-39 MIN: ICD-10-PCS | Mod: S$GLB,,, | Performed by: INTERNAL MEDICINE

## 2020-12-08 PROCEDURE — 1159F MED LIST DOCD IN RCRD: CPT | Mod: S$GLB,,, | Performed by: INTERNAL MEDICINE

## 2020-12-08 PROCEDURE — 3288F PR FALLS RISK ASSESSMENT DOCUMENTED: ICD-10-PCS | Mod: CPTII,S$GLB,, | Performed by: INTERNAL MEDICINE

## 2020-12-08 PROCEDURE — 1126F PR PAIN SEVERITY QUANTIFIED, NO PAIN PRESENT: ICD-10-PCS | Mod: S$GLB,,, | Performed by: INTERNAL MEDICINE

## 2020-12-08 RX ORDER — FUROSEMIDE 20 MG/1
20 TABLET ORAL 2 TIMES DAILY PRN
Qty: 180 TABLET | Refills: 3 | Status: SHIPPED | OUTPATIENT
Start: 2020-12-08 | End: 2021-03-16 | Stop reason: SDUPTHER

## 2020-12-08 RX ORDER — FUROSEMIDE 40 MG/1
40 TABLET ORAL DAILY
Qty: 90 TABLET | Refills: 3 | Status: SHIPPED | OUTPATIENT
Start: 2020-12-08 | End: 2020-12-08 | Stop reason: SDUPTHER

## 2020-12-08 NOTE — PROGRESS NOTES
"Subjective:    Patient ID:  Ella Baron is a 75 y.o. female who presents for follow-up of Results      HPI     STEMI 8/31/19 - medical Rx due to penetrating aortic ulcer, REMY to LAD 10/11/19, Ischemic CM EF 25% - improved to 45%. LV thrombus on xarelto, HTN, HLD, former tobacco abuse     Followed at [facility] [place]  74 year old female with PMH CAD, Ischemic cardiomyopathy (EF 25%, wearing life vest) with LV apical thrombus (on Xarelto), congenital absence of left ulnar artery, penetrating descending aortic ulcer, HTN, former tobacco use here for hospital follow-up of STEMI. The patient was in her usual state of health until labor day long weekend, when she was awakened at 4:00 AM on 8/31/19 with a "cramping" sensation across her chest and back, with paresthesia of both arms. She sat in her recliner, took 2 advil and this sensation subsided within 20-30 minutes. The next day she had the same episode after eating scrambled eggs, also resolved within 20-30 minutes of taking 2 advil. The third and final episode happened the following day, again after food intake, lasting 20-30 minutes. The following day she had no symptoms but saw her PCP who found STEMI on EKG, she was transferred to the ER, found to have a penetrating aortic ulcer, was transferred to [facility] and penetrating aortic ulcer was not intervened upon and she was outside of the time frame for angiography; CAD was managed medically. She was found to have a reduced LVEF of 25% with LAD territory WMA and LAD territory fixed defect on PET. She is scheduled for PET viability, follow-up with vascular surgery and Dr. Mederos who will become her primary cardiologist.     Other than the three episodes of chest/back "cramping" mentioned above, the patient has been completely asymptomatic. She denies GANT (NYHA I) and continues to perform all ADLs as before, denies orthopnea or PND but has noticed mild edema which she attributes to norvasc. She denies symptoms of angina, syncope or life " vest shocks and has had no claudication symptoms or skin ulceration. She is a former smoker, quit 30 days ago. No family history of CAD or sudden cardiac death. She is on triple therapy with ASA, plavix and Xarelto and has not had any bleeding issues.     The patient is free of angina s/p medically treated STEMI with no revascularization (asymptomatic and out of STEMI window upon arrival to [facility]).  Continue DAPT with ASA, plavix, high intensity statin, BB (metoprolol) and ARB (losartan)  PET with 40% LV myocardium fixed defect in LAD territory; viability study scheduled  Plan for LHC +/- PCI following viability study - no left radial access due to congenital absence of left ulnar artery  Will schedule cardiac rehab following LHC +/- PCI     1. Cardiac catheterization with probable PCI.   2. Antiplatelets: ASA, plavix  3. Access: Right radial  4. Catheters: Bayron. No LVEDP (thrombus)  5. Pt is a REMY candidate and understands the importance of taking plavix for at least one year, understands that in case of receiving a drug coated stent the failure to comply with dual anti-platelet therapy is likely to result in stent clothing, heart attack and death.   6. The risks, benefits, and alternatives of coronary vascular angiography and possible intervention were discussed with the patient. All questions were answered and informed consent was obtained. I had a detailed discussion with the patient regarding risk of stroke, MI, bleeding access site complications including limb loss, allergy, kidney failure including dialysis and death.  7. The patient understands the risks and benefits and wishes to go ahead with the procedure.  8. All patient's questions were answered     Ischemic cardiomyopathy with LV thrombus  NYHA I, LVEF 25%  Wearing life vest  Continue losartan, metoprolol  Educated on importance of fluid restriction and low sodium diet  Continue Xarelto for a total fo 3 months; we discussed the alternative of using  warfarin due to cost, however considering the requirement for consistent diet and therapeutic monitoring, the patient chose to remain on DOAC     Penetrating aortic ulcer  Scheduled for follow-up with vascular surgery     HTN  118/68  Continue norvasc, metoprolol, losartan     Tobacco abuse  Quit smoking 30 days ago    Admitted 10/11/19  Hospital Course:   Patient presented for outpatient coronary angiogram which went without complication. Coronary angiogram revealed mid LAD 80% stenosis proximal to a large diagonal branch. She underwent successful PCI of her mid LAD with a 3.5 x 15 mm REMY. See full cath report in Epic for details. Hemostasis of patient's R Radial access site was achieved with VascBand. Patient was monitored per post-cath protocol, and her R radial access site was c/d/i with no hematoma. Patient was able to ambulate without difficulty. She was feeling well and anticipating discharge home today.      Outpatient Plan:  - Cardiac rehab referral  - Continue medical management  - Risk factor reduction  - Plavix for at least 1 year and ASA 81 mg indefinitely  - Follow-up with outpatient cardiologist (Dr. Mederos) as scheduled     [facility] 10/11/19  · Successful PCI of 80% mid LAD with 3.5X15 REMY     Echo 11/20/20  · The left ventricle is mildly enlarged with mildly decreased systolic function. The estimated ejection fraction is 45%.  · There is left ventricular eccentric hypertrophy.  · Grade I diastolic dysfunction.  · Normal right ventricular size with normal right ventricular systolic function.  · Moderate mitral regurgitation.  · Mild tricuspid regurgitation.  · There is mild pulmonary hypertension.  Images reviewed - still suspicious for LV apical thrombus       Echo 1/20/20  · Concentric left ventricular hypertrophy.  · Moderately decreased left ventricular systolic function. The estimated ejection fraction is 35%.  · Grade I (mild) left ventricular diastolic dysfunction consistent with impaired  relaxation.  · Local segmental wall motion abnormalities.  · Normal right ventricular systolic function.  · Mild left atrial enlargement.  · Moderate aortic regurgitation.  · Mild mitral regurgitation.  · No pulmonary hypertension present.  · Normal central venous pressure (3 mmHg).  · The estimated PA systolic pressure is 14 mmHg.           Echo  12/17/19    Technically difficult study.      Contrast was administered and successfully enabled adequate endocardial definition.     Mildly increased left ventricular cavity size.     Severely decreased left ventricular systolic function.     Left ventricular ejection fraction is estimated at 30%.     No apical thrombus noted.     Mildly increased left atrial size.     Mild mitral annular calcification.     Mild-moderate mitral valve regurgitation.     Mild aortic valve regurgitation.     Trace tricuspid valve regurgitation.      Echo 9/4/19  · Severely decreased left ventricular systolic function. The estimated ejection fraction is 25%  · Local segmental wall motion abnormalities with an aneurysmal apex and layered thrombus int he apex  · Eccentric left ventricular hypertrophy.  · Moderate left atrial enlargement.  · Grade I (mild) left ventricular diastolic dysfunction consistent with impaired relaxation and elevated LVEDP  · Normal right ventricular systolic function.  · Mild aortic regurgitation.  · Mild mitral regurgitation.  · Mild tricuspid regurgitation.  · The estimated PA systolic pressure is 33 mm Hg  · Normal central venous pressure (3 mm Hg).     PET viability 10/8/19    There is a matched defect present in the mid to distal anteroseptal and the apical walls comprising of ~30% of myocardium in the typical distribution of the LAD. This is non-viable.     10/15/19 Wearing Lifevest  Denies CP or SOB  Back to work  Has not yet started cardiac rehab  Surprisingly asymptomatic after recent large anterior STEMI  Cardiac rehab at   OV 3 months with repeat echo -  will recommend AICD if EF < 35%     12/18/19 Admitted to  with CHF this month  GANT still present but not as severe        1/21/20 Doing well with cardiac rehab  Denies CP or SOB  EF now at 35% - discussed AICD - she wants to hold off  Stop Lifevest  Thrombus not seen on echo - will continue with xarelto for now  OV 3 months with BNP, BMP     6/23/20 Recently having more LE edema - takes lasix 20 qd  Denies CP, mild GANT  EKG NSR PRWP  Increase lasix 40 qd, Add KCL 10 meq qd  OV 1 month with BNP, BMP  Repeat echo next year     8/18/20 Still with some LE edema - decreased her lasix to 20 qd over concerns about renal function. GNAT improved some. Denies CP  Ok to increase lasix to 40 qd until LE edema resolved  OV 3 months with echo to look at EF and LV thrombus, BNP, BMP      Labs 11/20/20  K 3.9  Cr 0.9      12/8/20 Denies CP. Mild GANT  Occasional LE edema       Review of Systems   Constitution: Negative for decreased appetite.   HENT: Negative for ear discharge.    Eyes: Negative for blurred vision.   Respiratory: Negative for hemoptysis.    Endocrine: Negative for polyphagia.   Hematologic/Lymphatic: Negative for adenopathy.   Skin: Negative for color change.   Musculoskeletal: Negative for joint swelling.   Genitourinary: Negative for bladder incontinence.   Neurological: Negative for brief paralysis.   Psychiatric/Behavioral: Negative for hallucinations.   Allergic/Immunologic: Negative for hives.        Objective:    Physical Exam   Constitutional: She is oriented to person, place, and time. She appears well-developed and well-nourished.   HENT:   Head: Normocephalic and atraumatic.   Eyes: Pupils are equal, round, and reactive to light. Conjunctivae are normal.   Neck: Normal range of motion. Neck supple.   Cardiovascular: Normal rate, normal heart sounds and intact distal pulses.   Pulmonary/Chest: Effort normal and breath sounds normal.   Abdominal: Soft. Bowel sounds are normal.   Musculoskeletal:  Normal range of motion.   Neurological: She is alert and oriented to person, place, and time.   Skin: Skin is warm and dry.         Assessment:       1. Acute on chronic combined systolic and diastolic CHF (congestive heart failure)    2. Coronary artery disease involving native coronary artery of native heart without angina pectoris    3. Ischemic cardiomyopathy    4. Abnormal CT of the abdomen         Plan:       Will continue xarelto since I cannot completely rule out LV apical thrombus on echo.  EF improved some. Volume status appears stable  OV 6 months with BNP, BMP

## 2021-03-17 RX ORDER — FUROSEMIDE 20 MG/1
20 TABLET ORAL 2 TIMES DAILY PRN
Qty: 180 TABLET | Refills: 3 | Status: SHIPPED | OUTPATIENT
Start: 2021-03-17 | End: 2021-03-24 | Stop reason: SDUPTHER

## 2021-03-24 RX ORDER — FUROSEMIDE 20 MG/1
20 TABLET ORAL 2 TIMES DAILY PRN
Qty: 180 TABLET | Refills: 3 | Status: SHIPPED | OUTPATIENT
Start: 2021-03-24 | End: 2022-04-01 | Stop reason: SDUPTHER

## 2021-03-24 RX ORDER — FUROSEMIDE 20 MG/1
20 TABLET ORAL 2 TIMES DAILY PRN
Qty: 180 TABLET | Refills: 3 | Status: CANCELLED | OUTPATIENT
Start: 2021-03-24 | End: 2022-03-24

## 2021-04-13 ENCOUNTER — PATIENT MESSAGE (OUTPATIENT)
Dept: RESEARCH | Facility: HOSPITAL | Age: 76
End: 2021-04-13

## 2021-07-23 ENCOUNTER — OFFICE VISIT (OUTPATIENT)
Dept: CARDIOLOGY | Facility: CLINIC | Age: 76
End: 2021-07-23
Payer: COMMERCIAL

## 2021-07-23 VITALS
HEART RATE: 83 BPM | BODY MASS INDEX: 29.16 KG/M2 | SYSTOLIC BLOOD PRESSURE: 148 MMHG | OXYGEN SATURATION: 91 % | HEIGHT: 66 IN | WEIGHT: 181.44 LBS | DIASTOLIC BLOOD PRESSURE: 78 MMHG

## 2021-07-23 DIAGNOSIS — I51.3 LEFT VENTRICULAR THROMBOSIS: ICD-10-CM

## 2021-07-23 DIAGNOSIS — I50.43 ACUTE ON CHRONIC COMBINED SYSTOLIC AND DIASTOLIC CHF (CONGESTIVE HEART FAILURE): Primary | ICD-10-CM

## 2021-07-23 DIAGNOSIS — I25.5 ISCHEMIC CARDIOMYOPATHY: ICD-10-CM

## 2021-07-23 DIAGNOSIS — I25.10 CORONARY ARTERY DISEASE INVOLVING NATIVE CORONARY ARTERY OF NATIVE HEART WITHOUT ANGINA PECTORIS: ICD-10-CM

## 2021-07-23 DIAGNOSIS — I50.20 NYHA CLASS 2 HEART FAILURE WITH REDUCED EJECTION FRACTION: ICD-10-CM

## 2021-07-23 PROCEDURE — 99999 PR PBB SHADOW E&M-EST. PATIENT-LVL IV: CPT | Mod: PBBFAC,,, | Performed by: INTERNAL MEDICINE

## 2021-07-23 PROCEDURE — 1101F PT FALLS ASSESS-DOCD LE1/YR: CPT | Mod: CPTII,S$GLB,, | Performed by: INTERNAL MEDICINE

## 2021-07-23 PROCEDURE — 93000 EKG 12-LEAD: ICD-10-PCS | Mod: S$GLB,,, | Performed by: INTERNAL MEDICINE

## 2021-07-23 PROCEDURE — 99214 PR OFFICE/OUTPT VISIT, EST, LEVL IV, 30-39 MIN: ICD-10-PCS | Mod: S$GLB,,, | Performed by: INTERNAL MEDICINE

## 2021-07-23 PROCEDURE — 3288F FALL RISK ASSESSMENT DOCD: CPT | Mod: CPTII,S$GLB,, | Performed by: INTERNAL MEDICINE

## 2021-07-23 PROCEDURE — 93000 ELECTROCARDIOGRAM COMPLETE: CPT | Mod: S$GLB,,, | Performed by: INTERNAL MEDICINE

## 2021-07-23 PROCEDURE — 1126F AMNT PAIN NOTED NONE PRSNT: CPT | Mod: CPTII,S$GLB,, | Performed by: INTERNAL MEDICINE

## 2021-07-23 PROCEDURE — 3077F PR MOST RECENT SYSTOLIC BLOOD PRESSURE >= 140 MM HG: ICD-10-PCS | Mod: CPTII,S$GLB,, | Performed by: INTERNAL MEDICINE

## 2021-07-23 PROCEDURE — 1101F PR PT FALLS ASSESS DOC 0-1 FALLS W/OUT INJ PAST YR: ICD-10-PCS | Mod: CPTII,S$GLB,, | Performed by: INTERNAL MEDICINE

## 2021-07-23 PROCEDURE — 3077F SYST BP >= 140 MM HG: CPT | Mod: CPTII,S$GLB,, | Performed by: INTERNAL MEDICINE

## 2021-07-23 PROCEDURE — 99214 OFFICE O/P EST MOD 30 MIN: CPT | Mod: S$GLB,,, | Performed by: INTERNAL MEDICINE

## 2021-07-23 PROCEDURE — 3078F PR MOST RECENT DIASTOLIC BLOOD PRESSURE < 80 MM HG: ICD-10-PCS | Mod: CPTII,S$GLB,, | Performed by: INTERNAL MEDICINE

## 2021-07-23 PROCEDURE — 1159F MED LIST DOCD IN RCRD: CPT | Mod: CPTII,S$GLB,, | Performed by: INTERNAL MEDICINE

## 2021-07-23 PROCEDURE — 99999 PR PBB SHADOW E&M-EST. PATIENT-LVL IV: ICD-10-PCS | Mod: PBBFAC,,, | Performed by: INTERNAL MEDICINE

## 2021-07-23 PROCEDURE — 3288F PR FALLS RISK ASSESSMENT DOCUMENTED: ICD-10-PCS | Mod: CPTII,S$GLB,, | Performed by: INTERNAL MEDICINE

## 2021-07-23 PROCEDURE — 3078F DIAST BP <80 MM HG: CPT | Mod: CPTII,S$GLB,, | Performed by: INTERNAL MEDICINE

## 2021-07-23 PROCEDURE — 1159F PR MEDICATION LIST DOCUMENTED IN MEDICAL RECORD: ICD-10-PCS | Mod: CPTII,S$GLB,, | Performed by: INTERNAL MEDICINE

## 2021-07-23 PROCEDURE — 1126F PR PAIN SEVERITY QUANTIFIED, NO PAIN PRESENT: ICD-10-PCS | Mod: CPTII,S$GLB,, | Performed by: INTERNAL MEDICINE

## 2022-03-13 PROCEDURE — 93000 ELECTROCARDIOGRAM COMPLETE: CPT | Mod: S$GLB,,, | Performed by: INTERNAL MEDICINE

## 2022-03-13 PROCEDURE — 93000 EKG 12-LEAD: ICD-10-PCS | Mod: S$GLB,,, | Performed by: INTERNAL MEDICINE

## 2022-04-01 ENCOUNTER — OFFICE VISIT (OUTPATIENT)
Dept: CARDIOLOGY | Facility: CLINIC | Age: 77
End: 2022-04-01
Payer: COMMERCIAL

## 2022-04-01 VITALS
HEART RATE: 72 BPM | HEIGHT: 66 IN | SYSTOLIC BLOOD PRESSURE: 118 MMHG | BODY MASS INDEX: 30.74 KG/M2 | DIASTOLIC BLOOD PRESSURE: 64 MMHG | WEIGHT: 191.25 LBS | RESPIRATION RATE: 16 BRPM | OXYGEN SATURATION: 96 %

## 2022-04-01 DIAGNOSIS — I51.3 LEFT VENTRICULAR THROMBOSIS: ICD-10-CM

## 2022-04-01 DIAGNOSIS — I10 ESSENTIAL HYPERTENSION: ICD-10-CM

## 2022-04-01 DIAGNOSIS — I25.5 ISCHEMIC CARDIOMYOPATHY: ICD-10-CM

## 2022-04-01 DIAGNOSIS — I25.10 CORONARY ARTERY DISEASE INVOLVING NATIVE CORONARY ARTERY OF NATIVE HEART WITHOUT ANGINA PECTORIS: Primary | ICD-10-CM

## 2022-04-01 PROCEDURE — 3078F DIAST BP <80 MM HG: CPT | Mod: CPTII,S$GLB,, | Performed by: INTERNAL MEDICINE

## 2022-04-01 PROCEDURE — 3074F SYST BP LT 130 MM HG: CPT | Mod: CPTII,S$GLB,, | Performed by: INTERNAL MEDICINE

## 2022-04-01 PROCEDURE — 1126F PR PAIN SEVERITY QUANTIFIED, NO PAIN PRESENT: ICD-10-PCS | Mod: CPTII,S$GLB,, | Performed by: INTERNAL MEDICINE

## 2022-04-01 PROCEDURE — 1126F AMNT PAIN NOTED NONE PRSNT: CPT | Mod: CPTII,S$GLB,, | Performed by: INTERNAL MEDICINE

## 2022-04-01 PROCEDURE — 99999 PR PBB SHADOW E&M-EST. PATIENT-LVL III: ICD-10-PCS | Mod: PBBFAC,,, | Performed by: INTERNAL MEDICINE

## 2022-04-01 PROCEDURE — 1101F PR PT FALLS ASSESS DOC 0-1 FALLS W/OUT INJ PAST YR: ICD-10-PCS | Mod: CPTII,S$GLB,, | Performed by: INTERNAL MEDICINE

## 2022-04-01 PROCEDURE — 1159F PR MEDICATION LIST DOCUMENTED IN MEDICAL RECORD: ICD-10-PCS | Mod: CPTII,S$GLB,, | Performed by: INTERNAL MEDICINE

## 2022-04-01 PROCEDURE — 99999 PR PBB SHADOW E&M-EST. PATIENT-LVL III: CPT | Mod: PBBFAC,,, | Performed by: INTERNAL MEDICINE

## 2022-04-01 PROCEDURE — 3288F FALL RISK ASSESSMENT DOCD: CPT | Mod: CPTII,S$GLB,, | Performed by: INTERNAL MEDICINE

## 2022-04-01 PROCEDURE — 1101F PT FALLS ASSESS-DOCD LE1/YR: CPT | Mod: CPTII,S$GLB,, | Performed by: INTERNAL MEDICINE

## 2022-04-01 PROCEDURE — 3288F PR FALLS RISK ASSESSMENT DOCUMENTED: ICD-10-PCS | Mod: CPTII,S$GLB,, | Performed by: INTERNAL MEDICINE

## 2022-04-01 PROCEDURE — 99214 PR OFFICE/OUTPT VISIT, EST, LEVL IV, 30-39 MIN: ICD-10-PCS | Mod: S$GLB,,, | Performed by: INTERNAL MEDICINE

## 2022-04-01 PROCEDURE — 99214 OFFICE O/P EST MOD 30 MIN: CPT | Mod: S$GLB,,, | Performed by: INTERNAL MEDICINE

## 2022-04-01 PROCEDURE — 1159F MED LIST DOCD IN RCRD: CPT | Mod: CPTII,S$GLB,, | Performed by: INTERNAL MEDICINE

## 2022-04-01 PROCEDURE — 3078F PR MOST RECENT DIASTOLIC BLOOD PRESSURE < 80 MM HG: ICD-10-PCS | Mod: CPTII,S$GLB,, | Performed by: INTERNAL MEDICINE

## 2022-04-01 PROCEDURE — 3074F PR MOST RECENT SYSTOLIC BLOOD PRESSURE < 130 MM HG: ICD-10-PCS | Mod: CPTII,S$GLB,, | Performed by: INTERNAL MEDICINE

## 2022-04-01 RX ORDER — FUROSEMIDE 20 MG/1
20 TABLET ORAL 2 TIMES DAILY PRN
Qty: 180 TABLET | Refills: 3 | Status: SHIPPED | OUTPATIENT
Start: 2022-04-01 | End: 2023-04-11

## 2022-04-01 NOTE — PROGRESS NOTES
"Subjective:    Patient ID:  Ella Braon is a 76 y.o. female who presents for follow-up of No chief complaint on file.      HPI     STEMI 8/31/19 - medical Rx due to penetrating aortic ulcer, REMY to LAD 10/11/19, Ischemic CM EF 25% - improved to 45%. LV thrombus on xarelto, HTN, HLD, former tobacco abuse     [place] [facility]  74 year [place] female with PMH CAD, Ischemic cardiomyopathy (EF 25%, wearing life vest) with LV apical thrombus (on Xarelto), congenital absence of left ulnar artery, penetrating descending aortic ulcer, HTN, former tobacco use here for hospital follow-up of STEMI. The patient was in her usual state of health until labor day long weekend, when she was awakened at 4:00 AM on 8/31/19 with a "cramping" sensation across her chest and back, with paresthesia of both arms. She sat in her recliner, took 2 advil and this sensation subsided within 20-30 minutes. The next day she had the same episode after eating scrambled eggs, also resolved within 20-30 minutes of taking 2 advil. The third and final episode happened the following day, again after food intake, lasting 20-30 minutes. The following day she had no symptoms but saw her PCP who found STEMI on EKG, she was transferred to the ER, found to have a penetrating aortic ulcer, was transferred to [facility] and penetrating aortic ulcer was not intervened upon and she was outside of the time frame for angiography; CAD was managed medically. She was found to have a reduced LVEF of 25% with LAD territory WMA and LAD territory fixed defect on PET. She is scheduled for PET viability, follow-up with vascular surgery and Dr. Mederos who will become her primary cardiologist.     Other than the three episodes of chest/back "cramping" mentioned above, the patient has been completely asymptomatic. She denies GANT (NYHA I) and continues to perform all ADLs as before, denies orthopnea or PND but has noticed mild edema which she attributes to norvasc. She denies symptoms of " angina, syncope or life vest shocks and has had no claudication symptoms or skin ulceration. She is a former smoker, quit 30 days ago. No family history of CAD or sudden cardiac death. She is on triple therapy with ASA, plavix and Xarelto and has not had any bleeding issues.     The patient is free of angina s/p medically treated STEMI with no revascularization (asymptomatic and out of STEM[place] window [place] [facility]  Continue DAPT with ASA, plavix, high intensity statin, BB (metoprolol) and ARB (losartan)  PET with 40% LV myocardium fixed defect in LAD territory; viability study scheduled  Plan for LHC +/- PCI following viability study - no left radial access due to congenital absence of left ulnar artery  Will schedule cardiac rehab following LHC +/- PCI     1. Cardiac catheterization with probable PCI.   2. Antiplatelets: ASA, plavix  3. Access: Right radial  4. Catheters: Bayron. No LVEDP (thrombus)  5. Pt is a REMY candidate and understands the importance of taking plavix for at least one year, understands that in case of receiving a drug coated stent the failure to comply with dual anti-platelet therapy is likely to result in stent clothing, heart attack and death.   6. The risks, benefits, and alternatives of coronary vascular angiography and possible intervention were discussed with the patient. All questions were answered and informed consent was obtained. I had a detailed discussion with the patient regarding risk of stroke, MI, bleeding access site complications including limb loss, allergy, kidney failure including dialysis and death.  7. The patient understands the risks and benefits and wishes to go ahead with the procedure.  8. All patient's questions were answered     Ischemic cardiomyopathy with LV thrombus  NYHA I, LVEF 25%  Wearing life vest  Continue losartan, metoprolol  Educated on importance of fluid restriction and low sodium diet  Continue Xarelto for a total fo 3 months; we discussed the  alternative of using warfarin due to cost, however considering the requirement for consistent diet and therapeutic monitoring, the patient chose to remain on DOAC     Penetrating aortic ulcer  Scheduled for follow-up with vascular surgery     HTN  118/68  Continue norvasc, metoprolol, losartan     Tobacco abuse  Quit smoking 30 days ago    Admitted 10/11/19  Hospital Course:   Patient presented for outpatient coronary angiogram which went without complication. Coronary angiogram revealed mid LAD 80% stenosis proximal to a large diagonal branch. She underwent successful PCI of her mid LAD with a 3.5 x 15 mm REMY. See full cath report in Epic for details. Hemostasis of patient's R Radial access site was achieved with VascBand. Patient was monitored per post-cath protocol, and her R radial access site was c/d/i with no hematoma. Patient was able to ambulate without difficulty. She was feeling well and anticipating discharge home today.      Outpatient Plan:  - Cardiac rehab referral  - Continue medical management  - Risk factor reduction  - Plavix for at least 1 year and ASA 81 mg indefinitely  - Follow-up with outpatient cardiologist (Dr. Mederos) as scheduled     [facility] 10/11/19  · Successful PCI of 80% mid LAD with 3.5X15 REMY      Echo 11/20/20  · The left ventricle is mildly enlarged with mildly decreased systolic function. The estimated ejection fraction is 45%.  · There is left ventricular eccentric hypertrophy.  · Grade I diastolic dysfunction.  · Normal right ventricular size with normal right ventricular systolic function.  · Moderate mitral regurgitation.  · Mild tricuspid regurgitation.  · There is mild pulmonary hypertension.  Images reviewed - still suspicious for LV apical thrombus        Echo 1/20/20  · Concentric left ventricular hypertrophy.  · Moderately decreased left ventricular systolic function. The estimated ejection fraction is 35%.  · Grade I (mild) left ventricular diastolic dysfunction consistent  with impaired relaxation.  · Local segmental wall motion abnormalities.  · Normal right ventricular systolic function.  · Mild left atrial enlargement.  · Moderate aortic regurgitation.  · Mild mitral regurgitation.  · No pulmonary hypertension present.  · Normal central venous pressure (3 mmHg).  · The estimated PA systolic pressure is 14 mmHg.           Echo  12/17/19    Technically difficult study.      Contrast was administered and successfully enabled adequate endocardial definition.     Mildly increased left ventricular cavity size.     Severely decreased left ventricular systolic function.     Left ventricular ejection fraction is estimated at 30%.     No apical thrombus noted.     Mildly increased left atrial size.     Mild mitral annular calcification.     Mild-moderate mitral valve regurgitation.     Mild aortic valve regurgitation.     Trace tricuspid valve regurgitation.      Echo 9/4/19  · Severely decreased left ventricular systolic function. The estimated ejection fraction is 25%  · Local segmental wall motion abnormalities with an aneurysmal apex and layered thrombus int he apex  · Eccentric left ventricular hypertrophy.  · Moderate left atrial enlargement.  · Grade I (mild) left ventricular diastolic dysfunction consistent with impaired relaxation and elevated LVEDP  · Normal right ventricular systolic function.  · Mild aortic regurgitation.  · Mild mitral regurgitation.  · Mild tricuspid regurgitation.  · The estimated PA systolic pressure is 33 mm Hg  · Normal central venous pressure (3 mm Hg).     PET viability 10/8/19    There is a matched defect present in the mid to distal anteroseptal and the apical walls comprising of ~30% of myocardium in the typical distribution of the LAD. This is non-viable.     10/15/19 Wearing Lifevest  Denies CP or SOB  Back to work  Has not yet started cardiac rehab  Surprisingly asymptomatic after recent large anterior STEMI  Cardiac rehab at   OV 3 months with  repeat echo - will recommend AICD if EF < 35%     12/18/19 Admitted to  with CHF this month  GANT still present but not as severe        1/21/20 Doing well with cardiac rehab  Denies CP or SOB  EF now at 35% - discussed AICD - she wants to hold off  Stop Lifevest  Thrombus not seen on echo - will continue with xarelto for now  OV 3 months with BNP, BMP     6/23/20 Recently having more LE edema - takes lasix 20 qd  Denies CP, mild GANT  EKG NSR PRWP  Increase lasix 40 qd, Add KCL 10 meq qd  OV 1 month with BNP, BMP  Repeat echo next year     8/18/20 Still with some LE edema - decreased her lasix to 20 qd over concerns about renal function. GANT improved some. Denies CP  Ok to increase lasix to 40 qd until LE edema resolved  OV 3 months with echo to look at EF and LV thrombus, BNP, BMP      12/8/20 Denies CP. Mild GANT  Occasional LE edema  Will continue xarelto since I cannot completely rule out LV apical thrombus on echo.  EF improved some. Volume status appears stable  OV 6 months with BNP, BMP     7/23/21 Denies CP. Mild stable GANT  EKG NSR PRWP  Continue Rx for CAD, CHF, LV thrombus, HTN, HLD  OV 6 months with repeat echo - consider stopping OAC if thrombus resolved      4/1/22 Denies CP or SOB  BP controlled  EKG NSR PRWP NSSTT changes       Review of Systems   Constitutional: Negative for decreased appetite.   HENT: Negative for ear discharge.    Eyes: Negative for blurred vision.   Respiratory: Negative for hemoptysis.    Endocrine: Negative for polyphagia.   Hematologic/Lymphatic: Negative for adenopathy.   Skin: Negative for color change.   Musculoskeletal: Negative for joint swelling.   Genitourinary: Negative for bladder incontinence.   Neurological: Negative for brief paralysis.   Psychiatric/Behavioral: Negative for hallucinations.   Allergic/Immunologic: Negative for hives.        Objective:    Physical Exam  Constitutional:       Appearance: She is well-developed.   HENT:      Head: Normocephalic and  atraumatic.   Eyes:      Conjunctiva/sclera: Conjunctivae normal.      Pupils: Pupils are equal, round, and reactive to light.   Cardiovascular:      Rate and Rhythm: Normal rate.      Pulses: Intact distal pulses.      Heart sounds: Normal heart sounds.   Pulmonary:      Effort: Pulmonary effort is normal.      Breath sounds: Normal breath sounds.   Abdominal:      General: Bowel sounds are normal.      Palpations: Abdomen is soft.   Musculoskeletal:         General: Normal range of motion.      Cervical back: Normal range of motion and neck supple.   Skin:     General: Skin is warm and dry.   Neurological:      Mental Status: She is alert and oriented to person, place, and time.           Assessment:       1. Coronary artery disease involving native coronary artery of native heart without angina pectoris    2. Essential hypertension    3. Ischemic cardiomyopathy    4. Left ventricular thrombosis         Plan:       Continue Rx for CAD, CHF, LV thrombus, HTN, HLD  OV 3 months with repeat echo - consider stopping OAC if thrombus resolved

## 2022-06-21 NOTE — Clinical Note
3 [facility]ing [facility]   Hereditary Cancer Risk Assessment     Name: Marc Javed   YOB: 1959   Date of Consultation: 22     Ms. Reynold Paez was seen at the [place] [id number] for genetic counseling on 22. Ms. Reynold Paez was referred by Nu Hyde DO due to her family history of cancer. PERSONAL HISTORY   Ms. Reynold Paez is a 61 y.o.  female with no personal history of cancer. Menarche at age: 8y  First child at age: 13y  Menopause at age: 49y  Hysterectomy: NA  Oophorectomy: NA  Last mammogram: 21  Last breast MRI: NA    FAMILY HISTORY  Ms. Reynold Paez has one daughter (55y) and one son (45y). She has one sister and one brother, no cancer for either. Ms. Brooklyn Bragg mother is  and had a history of 3 primary breast cancers beginning in her 62s. She had one maternal aunt and one maternal uncle, no cancer. Her maternal great grandmother (grandmother's mother) had breast cancer as well. Ms. Brooklyn Bragg father passed away from prostate cancer later in life. Her paternal grandmother had breast cancer in her 76s. In total, she had one paternal aunt and three paternal uncles. One of her uncles daughters (Ms. Brooklyn Bragg paternal first cousin)  of breast cancer in her 25s. Ms. Reynold Paez reports [place], [place], and  ancestry and denies any known Ashkenazi Mormon heritage. RISK ASSESSMENT   We discussed that approximately 5-10% of cancers are due to a hereditary gene mutation which causes an increased risk for certain cancers. Hereditary cancers are typically diagnosed at younger ages (under age 46y) and occur in multiple generations of a family. Multiple individuals with the same type of cancer (example: breast or colorectal) or uncommon cancers (example: ovarian, pancreatic, male breast cancer) are also features of hereditary cancers. In summary, Ms. [address] (NCCN) guidelines for genetic Angiography performed post intervention of the left coronary arteries in multiple views. Angiography performed via hand injection with .  testing of the BRCA1/2 genes due to her mother having three primary breast cancers as well as her paternal family history of two relatives with breast cancer and one with prostate cancer, one of the breast cancers diagnosed under age 48. The NCCN guidelines also recognize that an individual's personal and/or family history may be explained by more than one inherited cancer syndrome. Thus, a multi-gene panel may be more efficient, more cost effecting, and increases the yield of detecting a hereditary mutation which would impact medical management. Given her personal and/or family history, we recommend testing for the following genes at minimum: MARCIO, CHEK2, and PALB2. DISCUSSION  We discussed that the BRCA1/2 genes are the most common genes associated with hereditary breast, ovarian, prostate and pancreatic cancer. We also discussed that genetic testing is available for multiple other genes related to hereditary cancer. Some of these genes are known to carry a significant increased risk for several cancers including colon, breast, uterine, ovarian, stomach, and pancreatic cancer, while some of these genes are believed to have a moderate increased risk for breast and other cancers. We discussed the possibility of finding a mutation in genes with limited information to guide medical management, as well we as the possibility of identifying variants of uncertain significance (VUS). We discussed the risks, benefits, and limitations of genetic testing. Possible test results were discussed as well as potential screening and prevention strategies.  Specifically, we discussed:    1) Increased breast cancer surveillance by mammogram and breast MRI as well as the option of prophylactic mastectomy  2) Possible recommendations for prophylactic oophorectomy for results which suggest an increased risk for ovarian cancer  3) Possible recommendations for prophylactic hysterectomy for results which suggest an increased risk for endometrial cancer  4) Increased colon cancer surveillance by colonoscopy screening every 1-2 years beginning by age 18-19y    Lastly, we discussed that the results of Ms. Gretel Walker genetic testing may be beneficial in defining her risk for cancer as well as for her family members. SUMMARY & PLAN  1) Ms. Espinal meets the NCCN criteria for genetic testing based on her family history of breast and prostate cancer. 2) Genetic testing via a multi-gene panel was recommended and offered to Ms. Espinal. 3) Ms. Espinal elected to proceed with the CancerNext Expanded + RNA [employer] Hereditary Cancer Gene Panel. 4) Ms. Imelda Gallegos is aware that she will receive a notification from the laboratory [facility]) if the out of pocket cost for testing exceeds $100 (based on individual insurance plan) and the option to proceed with the self pay price of $249.     5) Informed consent was obtained and a blood sample was sent to [facility]. We will call Ms. Espinal with results as soon as they are available. A follow up appointment may be recommended. A summary letter with results and final medical management recommendations will be sent once available. A total of 35 minutes were spent face to face with Ms. Imelda Gallegos and 50% of the time was spent educating and counseling. The [address] [facility] would be glad to offer our assistance should you have any questions or concerns about this information. Please feel free to contact us at [phone]. Emeka Coburn.  Kathleen Jefferson, MS, [facility]   Licensed Genetic Counselor

## 2022-08-16 ENCOUNTER — OFFICE VISIT (OUTPATIENT)
Dept: CARDIOLOGY | Facility: CLINIC | Age: 77
End: 2022-08-16
Payer: COMMERCIAL

## 2022-08-16 VITALS
WEIGHT: 189.13 LBS | HEART RATE: 77 BPM | BODY MASS INDEX: 30.4 KG/M2 | OXYGEN SATURATION: 96 % | HEIGHT: 66 IN | SYSTOLIC BLOOD PRESSURE: 125 MMHG | DIASTOLIC BLOOD PRESSURE: 69 MMHG

## 2022-08-16 DIAGNOSIS — I21.02 ACUTE ST ELEVATION MYOCARDIAL INFARCTION (STEMI) INVOLVING LEFT ANTERIOR DESCENDING (LAD) CORONARY ARTERY: ICD-10-CM

## 2022-08-16 DIAGNOSIS — I50.43 ACUTE ON CHRONIC COMBINED SYSTOLIC AND DIASTOLIC CHF (CONGESTIVE HEART FAILURE): Primary | ICD-10-CM

## 2022-08-16 DIAGNOSIS — I10 ESSENTIAL HYPERTENSION: ICD-10-CM

## 2022-08-16 DIAGNOSIS — I51.3 LEFT VENTRICULAR THROMBOSIS: ICD-10-CM

## 2022-08-16 DIAGNOSIS — I25.5 ISCHEMIC CARDIOMYOPATHY: ICD-10-CM

## 2022-08-16 PROCEDURE — 93000 ELECTROCARDIOGRAM COMPLETE: CPT | Mod: S$GLB,,, | Performed by: INTERNAL MEDICINE

## 2022-08-16 PROCEDURE — 3288F PR FALLS RISK ASSESSMENT DOCUMENTED: ICD-10-PCS | Mod: CPTII,S$GLB,, | Performed by: INTERNAL MEDICINE

## 2022-08-16 PROCEDURE — 1101F PR PT FALLS ASSESS DOC 0-1 FALLS W/OUT INJ PAST YR: ICD-10-PCS | Mod: CPTII,S$GLB,, | Performed by: INTERNAL MEDICINE

## 2022-08-16 PROCEDURE — 1126F PR PAIN SEVERITY QUANTIFIED, NO PAIN PRESENT: ICD-10-PCS | Mod: CPTII,S$GLB,, | Performed by: INTERNAL MEDICINE

## 2022-08-16 PROCEDURE — 1159F MED LIST DOCD IN RCRD: CPT | Mod: CPTII,S$GLB,, | Performed by: INTERNAL MEDICINE

## 2022-08-16 PROCEDURE — 1159F PR MEDICATION LIST DOCUMENTED IN MEDICAL RECORD: ICD-10-PCS | Mod: CPTII,S$GLB,, | Performed by: INTERNAL MEDICINE

## 2022-08-16 PROCEDURE — 3288F FALL RISK ASSESSMENT DOCD: CPT | Mod: CPTII,S$GLB,, | Performed by: INTERNAL MEDICINE

## 2022-08-16 PROCEDURE — 3074F PR MOST RECENT SYSTOLIC BLOOD PRESSURE < 130 MM HG: ICD-10-PCS | Mod: CPTII,S$GLB,, | Performed by: INTERNAL MEDICINE

## 2022-08-16 PROCEDURE — 99214 PR OFFICE/OUTPT VISIT, EST, LEVL IV, 30-39 MIN: ICD-10-PCS | Mod: S$GLB,,, | Performed by: INTERNAL MEDICINE

## 2022-08-16 PROCEDURE — 1126F AMNT PAIN NOTED NONE PRSNT: CPT | Mod: CPTII,S$GLB,, | Performed by: INTERNAL MEDICINE

## 2022-08-16 PROCEDURE — 99999 PR PBB SHADOW E&M-EST. PATIENT-LVL III: ICD-10-PCS | Mod: PBBFAC,,, | Performed by: INTERNAL MEDICINE

## 2022-08-16 PROCEDURE — 99999 PR PBB SHADOW E&M-EST. PATIENT-LVL III: CPT | Mod: PBBFAC,,, | Performed by: INTERNAL MEDICINE

## 2022-08-16 PROCEDURE — 3074F SYST BP LT 130 MM HG: CPT | Mod: CPTII,S$GLB,, | Performed by: INTERNAL MEDICINE

## 2022-08-16 PROCEDURE — 93000 EKG 12-LEAD: ICD-10-PCS | Mod: S$GLB,,, | Performed by: INTERNAL MEDICINE

## 2022-08-16 PROCEDURE — 3078F PR MOST RECENT DIASTOLIC BLOOD PRESSURE < 80 MM HG: ICD-10-PCS | Mod: CPTII,S$GLB,, | Performed by: INTERNAL MEDICINE

## 2022-08-16 PROCEDURE — 3078F DIAST BP <80 MM HG: CPT | Mod: CPTII,S$GLB,, | Performed by: INTERNAL MEDICINE

## 2022-08-16 PROCEDURE — 99214 OFFICE O/P EST MOD 30 MIN: CPT | Mod: S$GLB,,, | Performed by: INTERNAL MEDICINE

## 2022-08-16 PROCEDURE — 1101F PT FALLS ASSESS-DOCD LE1/YR: CPT | Mod: CPTII,S$GLB,, | Performed by: INTERNAL MEDICINE

## 2022-08-16 NOTE — PROGRESS NOTES
"Subjective:    Patient ID:  Ella Baron is a 77 y.o. female who presents for follow-up of No chief complaint on file.      HPI     STEMI 8/31/19 - medical Rx due to penetrating aortic ulcer, REMY to LAD 10/11/19, Ischemic CM EF 25% - improved to 45%. LV thrombus on xarelto, HTN, HLD, former tobacco abuse     [place] [facility] [place]  74 [place] female with PMH CAD, Ischemic cardiomyopathy (EF 25%, wearing life vest) with LV apical thrombus (on Xarelto), congenital absence of left ulnar artery, penetrating descending aortic ulcer, HTN, former tobacco use here for hospital follow-up of STEMI. The patient was in her usual state of health until labor day long weekend, when she was awakened at 4:00 AM on 8/31/19 with a "cramping" sensation across her chest and back, with paresthesia of both arms. She sat in her recliner, took 2 advil and this sensation subsided within 20-30 minutes. The next day she had the same episode after eating scrambled eggs, also resolved within 20-30 minutes of taking 2 advil. The third and final episode happened the following day, again after food intake, lasting 20-30 minutes. The following day she had no symptoms but saw her PCP who found STEMI on EKG, she was transferred to the ER, found to have a penetrating aortic ulcer, [place] [facility] [place] ulcer was not intervened upon and she was outside of the time frame for angiography; CAD was managed medically. She was found to have a reduced LVEF of 25% with LAD territory WMA and LAD territory fixed defect on PET. She is scheduled for PET viability, follow-up with vascular surgery and Dr. Mederos who will become her primary cardiologist.     Other than the three episodes of chest/back "cramping" mentioned above, the patient has been completely asymptomatic. She denies GANT (NYHA I) and continues to perform all ADLs as before, denies orthopnea or PND but has noticed mild edema which she attributes to norvasc. She denies symptoms of " angina, syncope or life vest shocks and has had no claudication symptoms or skin ulceration. She is a former smoker, quit 30 days ago. No family history of CAD or sudden cardiac death. She is on triple therapy with ASA, plavix and Xarelto and has not had any bleeding issues.     The patient is free of angina s/p medically treated STEMI with no revascularization (asymptomatic and out of STEM[place] window [place] [facility] [place]  Continue DAPT with ASA, plavix, high intensity statin, BB (metoprolol) and ARB (losartan)  PET with 40% LV myocardium fixed defect in LAD territory; viability study scheduled  Plan for LHC +/- PCI following viability study - no left radial access due to congenital absence of left ulnar artery  Will schedule cardiac rehab following LHC +/- PCI     1. Cardiac catheterization with probable PCI.   2. Antiplatelets: ASA, plavix  3. Access: Right radial  4. Catheters: [place]. No LVEDP (thrombus)  5. Pt is a REMY candidate and understands the importance of taking plavix for at least one year, understands that in case of receiving a drug coated stent the failure to comply with dual anti-platelet therapy is likely to result in stent clothing, heart attack and death.   6. The risks, benefits, and alternatives of coronary vascular angiography and possible intervention were discussed with the patient. All questions were answered and informed consent was obtained. I had a detailed discussion with the patient regarding risk of stroke, MI, bleeding access site complications including limb loss, allergy, kidney failure including dialysis and death.  7. The patient understands the risks and benefits and wishes to go ahead with the procedure.  8. All patient's questions were answered     Ischemic cardiomyopathy with LV thrombus  NYHA I, LVEF 25%  Wearing life vest  Continue losartan, metoprolol  Educated on importance of fluid restriction and low sodium diet  Continue Xarelto for a total fo 3 months; we discussed the  alternative of using warfarin due to cost, however considering the requirement for consistent diet and therapeutic monitoring, the patient chose to remain on DOAC     Penetrating aortic ulcer  Scheduled for follow-up with vascular surgery     HTN  118/68  Continue norvasc, metoprolol, losartan     Tobacco abuse  Quit smoking 30 days ago    Admitted 10/11/19  Hospital Course:   Patient presented for outpatient coronary angiogram which went without complication. Coronary angiogram revealed mid LAD 80% stenosis proximal to a large diagonal branch. She underwent successful PCI of her mid LAD with a 3.5 x 15 mm REMY. See full cath report in Epic for details. Hemostasis of patient's R Radial access site was achieved with VascBand. Patient was monitored per post-cath protocol, and her R radial access site was c/d/i with no hematoma. Patient was able to ambulate without difficulty. She was feeling well and anticipating discharge home today.      Outpatient Plan:  - Cardiac rehab referral  - Continue medical management  - Risk factor reduction  - Plavix for at least 1 year and ASA 81 mg indefinitely  - Follow-up with outpatient cardiologist (Dr. Mederos) as scheduled     [facility] 10/11/19  · Successful PCI of 80% mid LAD with 3.5X15 REMY      Echo 11/20/20  · The left ventricle is mildly enlarged with mildly decreased systolic function. The estimated ejection fraction is 45%.  · There is left ventricular eccentric hypertrophy.  · Grade I diastolic dysfunction.  · Normal right ventricular size with normal right ventricular systolic function.  · Moderate mitral regurgitation.  · Mild tricuspid regurgitation.  · There is mild pulmonary hypertension.  Images reviewed - still suspicious for LV apical thrombus        Echo 1/20/20  · Concentric left ventricular hypertrophy.  · Moderately decreased left ventricular systolic function. The estimated ejection fraction is 35%.  · Grade I (mild) left ventricular diastolic dysfunction consistent  with impaired relaxation.  · Local segmental wall motion abnormalities.  · Normal right ventricular systolic function.  · Mild left atrial enlargement.  · Moderate aortic regurgitation.  · Mild mitral regurgitation.  · No pulmonary hypertension present.  · Normal central venous pressure (3 mmHg).  · The estimated PA systolic pressure is 14 mmHg.           Echo  12/17/19    Technically difficult study.      Contrast was administered and successfully enabled adequate endocardial definition.     Mildly increased left ventricular cavity size.     Severely decreased left ventricular systolic function.     Left ventricular ejection fraction is estimated at 30%.     No apical thrombus noted.     Mildly increased left atrial size.     Mild mitral annular calcification.     Mild-moderate mitral valve regurgitation.     Mild aortic valve regurgitation.     Trace tricuspid valve regurgitation.      Echo 9/4/19  · Severely decreased left ventricular systolic function. The estimated ejection fraction is 25%  · Local segmental wall motion abnormalities with an aneurysmal apex and layered thrombus int he apex  · Eccentric left ventricular hypertrophy.  · Moderate left atrial enlargement.  · Grade I (mild) left ventricular diastolic dysfunction consistent with impaired relaxation and elevated LVEDP  · Normal right ventricular systolic function.  · Mild aortic regurgitation.  · Mild mitral regurgitation.  · Mild tricuspid regurgitation.  · The estimated PA systolic pressure is 33 mm Hg  · Normal central venous pressure (3 mm Hg).     PET viability 10/8/19    There is a matched defect present in the mid to distal anteroseptal and the apical walls comprising of ~30% of myocardium in the typical distribution of the LAD. This is non-viable.     10/15/19 Wearing Lifevest  Denies CP or SOB  Back to work  Has not yet started cardiac rehab  Surprisingly asymptomatic after recent large anterior STEMI  Cardiac rehab at   OV 3 months with  repeat echo - will recommend AICD if EF < 35%     12/18/19 Admitted to  with CHF this month  GANT still present but not as severe        1/21/20 Doing well with cardiac rehab  Denies CP or SOB  EF now at 35% - discussed AICD - she wants to hold off  Stop Lifevest  Thrombus not seen on echo - will continue with xarelto for now  OV 3 months with BNP, BMP     6/23/20 Recently having more LE edema - takes lasix 20 qd  Denies CP, mild GANT  EKG NSR PRWP  Increase lasix 40 qd, Add KCL 10 meq qd  OV 1 month with BNP, BMP  Repeat echo next year     8/18/20 Still with some LE edema - decreased her lasix to 20 qd over concerns about renal function. GANT improved some. Denies CP  Ok to increase lasix to 40 qd until LE edema resolved  OV 3 months with echo to look at EF and LV thrombus, BNP, BMP      12/8/20 Denies CP. Mild GANT  Occasional LE edema  Will continue xarelto since I cannot completely rule out LV apical thrombus on echo.  EF improved some. Volume status appears stable  OV 6 months with BNP, BMP     7/23/21 Denies CP. Mild stable GANT  EKG NSR PRWP  Continue Rx for CAD, CHF, LV thrombus, HTN, HLD  OV 6 months with repeat echo - consider stopping OAC if thrombus resolved      4/1/22 Denies CP or SOB  BP controlled  EKG NSR PRWP NSSTT changes   Continue Rx for CAD, CHF, LV thrombus, HTN, HLD  OV 3 months with repeat echo - consider stopping OAC if thrombus resolved      8/16/22 Echo not done. Had an episode of dizziness this AM - thinks her glucose may have been low. BP has been stable. Denies other dizziness spells  Denies CP or SOB  EKG NSR old anterior MI    Review of Systems   Constitutional: Negative for decreased appetite.   HENT: Negative for ear discharge.    Eyes: Negative for blurred vision.   Respiratory: Negative for hemoptysis.    Endocrine: Negative for polyphagia.   Hematologic/Lymphatic: Negative for adenopathy.   Skin: Negative for color change.   Musculoskeletal: Negative for joint swelling.    Genitourinary: Negative for bladder incontinence.   Neurological: Negative for brief paralysis.   Psychiatric/Behavioral: Negative for hallucinations.   Allergic/Immunologic: Negative for hives.        Objective:    Physical Exam  Constitutional:       Appearance: She is well-developed.   HENT:      Head: Normocephalic and atraumatic.   Eyes:      Conjunctiva/sclera: Conjunctivae normal.      Pupils: Pupils are equal, round, and reactive to light.   Cardiovascular:      Rate and Rhythm: Normal rate.      Pulses: Intact distal pulses.      Heart sounds: Normal heart sounds.   Pulmonary:      Effort: Pulmonary effort is normal.      Breath sounds: Normal breath sounds.   Abdominal:      General: Bowel sounds are normal.      Palpations: Abdomen is soft.   Musculoskeletal:         General: Normal range of motion.      Cervical back: Normal range of motion and neck supple.   Skin:     General: Skin is warm and dry.   Neurological:      Mental Status: She is alert and oriented to person, place, and time.           Assessment:       1. Acute on chronic combined systolic and diastolic CHF (congestive heart failure)    2. Acute ST elevation myocardial infarction (STEMI) involving left anterior descending (LAD) coronary artery    3. Essential hypertension    4. Ischemic cardiomyopathy    5. Left ventricular thrombosis         Plan:       Echo - if LV thrombus resolved with discuss stopping xarelto   Continue Rx for CAD, CHF, LV thrombus, HTN, HLD

## 2023-10-18 ENCOUNTER — CLINICAL SUPPORT (OUTPATIENT)
Dept: OTHER | Facility: CLINIC | Age: 78
End: 2023-10-18
Payer: COMMERCIAL

## 2023-10-18 DIAGNOSIS — Z00.8 ENCOUNTER FOR OTHER GENERAL EXAMINATION: ICD-10-CM

## 2023-10-19 VITALS
HEIGHT: 65 IN | WEIGHT: 189 LBS | BODY MASS INDEX: 31.49 KG/M2 | SYSTOLIC BLOOD PRESSURE: 145 MMHG | DIASTOLIC BLOOD PRESSURE: 76 MMHG

## 2023-10-19 LAB
HDLC SERPL-MCNC: 48 MG/DL
POC CHOLESTEROL, LDL (DOCK): 81 MG/DL
POC CHOLESTEROL, TOTAL: 149 MG/DL
POC GLUCOSE, FASTING: 99 MG/DL (ref 60–110)
TRIGL SERPL-MCNC: 109 MG/DL

## 2023-11-09 ENCOUNTER — OFFICE VISIT (OUTPATIENT)
Dept: CARDIOLOGY | Facility: CLINIC | Age: 78
End: 2023-11-09
Payer: COMMERCIAL

## 2023-11-09 VITALS
SYSTOLIC BLOOD PRESSURE: 180 MMHG | HEART RATE: 102 BPM | BODY MASS INDEX: 31.48 KG/M2 | DIASTOLIC BLOOD PRESSURE: 90 MMHG | WEIGHT: 188.94 LBS | HEIGHT: 65 IN | OXYGEN SATURATION: 95 %

## 2023-11-09 DIAGNOSIS — I25.5 ISCHEMIC CARDIOMYOPATHY: ICD-10-CM

## 2023-11-09 DIAGNOSIS — I51.3 LEFT VENTRICULAR THROMBOSIS: ICD-10-CM

## 2023-11-09 DIAGNOSIS — I10 ESSENTIAL HYPERTENSION: ICD-10-CM

## 2023-11-09 DIAGNOSIS — I25.10 CORONARY ARTERY DISEASE INVOLVING NATIVE CORONARY ARTERY OF NATIVE HEART WITHOUT ANGINA PECTORIS: Primary | ICD-10-CM

## 2023-11-09 PROCEDURE — 3080F PR MOST RECENT DIASTOLIC BLOOD PRESSURE >= 90 MM HG: ICD-10-PCS | Mod: CPTII,S$GLB,, | Performed by: INTERNAL MEDICINE

## 2023-11-09 PROCEDURE — 1101F PR PT FALLS ASSESS DOC 0-1 FALLS W/OUT INJ PAST YR: ICD-10-PCS | Mod: CPTII,S$GLB,, | Performed by: INTERNAL MEDICINE

## 2023-11-09 PROCEDURE — 93000 EKG 12-LEAD: ICD-10-PCS | Mod: S$GLB,,, | Performed by: INTERNAL MEDICINE

## 2023-11-09 PROCEDURE — 1101F PT FALLS ASSESS-DOCD LE1/YR: CPT | Mod: CPTII,S$GLB,, | Performed by: INTERNAL MEDICINE

## 2023-11-09 PROCEDURE — 3288F FALL RISK ASSESSMENT DOCD: CPT | Mod: CPTII,S$GLB,, | Performed by: INTERNAL MEDICINE

## 2023-11-09 PROCEDURE — 3077F SYST BP >= 140 MM HG: CPT | Mod: CPTII,S$GLB,, | Performed by: INTERNAL MEDICINE

## 2023-11-09 PROCEDURE — 3080F DIAST BP >= 90 MM HG: CPT | Mod: CPTII,S$GLB,, | Performed by: INTERNAL MEDICINE

## 2023-11-09 PROCEDURE — 1126F PR PAIN SEVERITY QUANTIFIED, NO PAIN PRESENT: ICD-10-PCS | Mod: CPTII,S$GLB,, | Performed by: INTERNAL MEDICINE

## 2023-11-09 PROCEDURE — 1126F AMNT PAIN NOTED NONE PRSNT: CPT | Mod: CPTII,S$GLB,, | Performed by: INTERNAL MEDICINE

## 2023-11-09 PROCEDURE — 93000 ELECTROCARDIOGRAM COMPLETE: CPT | Mod: S$GLB,,, | Performed by: INTERNAL MEDICINE

## 2023-11-09 PROCEDURE — 1159F PR MEDICATION LIST DOCUMENTED IN MEDICAL RECORD: ICD-10-PCS | Mod: CPTII,S$GLB,, | Performed by: INTERNAL MEDICINE

## 2023-11-09 PROCEDURE — 1159F MED LIST DOCD IN RCRD: CPT | Mod: CPTII,S$GLB,, | Performed by: INTERNAL MEDICINE

## 2023-11-09 PROCEDURE — 3288F PR FALLS RISK ASSESSMENT DOCUMENTED: ICD-10-PCS | Mod: CPTII,S$GLB,, | Performed by: INTERNAL MEDICINE

## 2023-11-09 PROCEDURE — 99214 OFFICE O/P EST MOD 30 MIN: CPT | Mod: S$GLB,,, | Performed by: INTERNAL MEDICINE

## 2023-11-09 PROCEDURE — 99214 PR OFFICE/OUTPT VISIT, EST, LEVL IV, 30-39 MIN: ICD-10-PCS | Mod: S$GLB,,, | Performed by: INTERNAL MEDICINE

## 2023-11-09 PROCEDURE — 99999 PR PBB SHADOW E&M-EST. PATIENT-LVL IV: ICD-10-PCS | Mod: PBBFAC,,, | Performed by: INTERNAL MEDICINE

## 2023-11-09 PROCEDURE — 3077F PR MOST RECENT SYSTOLIC BLOOD PRESSURE >= 140 MM HG: ICD-10-PCS | Mod: CPTII,S$GLB,, | Performed by: INTERNAL MEDICINE

## 2023-11-09 PROCEDURE — 99999 PR PBB SHADOW E&M-EST. PATIENT-LVL IV: CPT | Mod: PBBFAC,,, | Performed by: INTERNAL MEDICINE

## 2023-11-09 NOTE — PROGRESS NOTES
"Subjective:   Patient ID:  Ella Baron is a 78 y.o. female who presents for follow-up of No chief complaint on file.      HPI    STEMI 8/31/19 - medical Rx due to penetrating aortic ulcer, REMY to LAD 10/11/19, Ischemic CM EF 25% - improved to 45%. LV thrombus on xarelto, HTN, HLD, former tobacco abuse     Followed at [facility] [place]  74 year old female with PMH CAD, Ischemic cardiomyopathy (EF 25%, wearing life vest) with LV apical thrombus (on Xarelto), congenital absence of left ulnar artery, penetrating descending aortic ulcer, HTN, former tobacco use here for hospital follow-up of STEMI. The patient was in her usual state of health until labor day long weekend, when she was awakened at 4:00 AM on 8/31/19 with a "cramping" sensation across her chest and back, with paresthesia of both arms. She sat in her recliner, took 2 advil and this sensation subsided within 20-30 minutes. The next day she had the same episode after eating scrambled eggs, also resolved within 20-30 minutes of taking 2 advil. The third and final episode happened the following day, again after food intake, lasting 20-30 minutes. The following day she had no symptoms but saw her PCP who found STEMI on EKG, she was transferred to the ER, found to have a penetrating aortic ulcer, was transferred to [facility] [place] and penetrating aortic ulcer was not intervened upon and she was outside of the time frame for angiography; CAD was managed medically. She was found to have a reduced LVEF of 25% with LAD territory WMA and LAD territory fixed defect on PET. She is scheduled for PET viability, follow-up with vascular surgery and Dr. Mederos who will become her primary cardiologist.     Other than the three episodes of chest/back "cramping" mentioned above, the patient has been completely asymptomatic. She denies GANT (NYHA I) and continues to perform all ADLs as before, denies orthopnea or PND but has noticed mild edema which she attributes to norvasc. She denies symptoms of " angina, syncope or life vest shocks and has had no claudication symptoms or skin ulceration. She is a former smoker, quit 30 days ago. No family history of CAD or sudden cardiac death. She is on triple therapy with ASA, plavix and Xarelto and has not had any bleeding issues.     The patient is free of angina s/p medically treated STEMI with no revascularization (asymptomatic and out of [place] [facility] [place]  Continue DAPT with ASA, plavix, high intensity statin, BB (metoprolol) and ARB (losartan)  PET with 40% LV myocardium fixed defect in LAD territory; viability study scheduled  Plan for LHC +/- PCI following viability study - no left radial access due to congenital absence of left ulnar artery  Will schedule cardiac rehab following LHC +/- PCI     Cardiac catheterization with probable PCI.   Antiplatelets: ASA, plavix  Access: Right radial  Catheters: Bayron. No LVEDP (thrombus)  Pt is a REMY candidate and understands the importance of taking plavix for at least one year, understands that in case of receiving a drug coated stent the failure to comply with dual anti-platelet therapy is likely to result in stent clothing, heart attack and death.   The risks, benefits, and alternatives of coronary vascular angiography and possible intervention were discussed with the patient. All questions were answered and informed consent was obtained. I had a detailed discussion with the patient regarding risk of stroke, MI, bleeding access site complications including limb loss, allergy, kidney failure including dialysis and death.  The patient understands the risks and benefits and wishes to go ahead with the procedure.  All patient's questions were answered     Ischemic cardiomyopathy with LV thrombus  NYHA I, LVEF 25%  Wearing life vest  Continue losartan, metoprolol  Educated on importance of fluid restriction and low sodium diet  Continue Xarelto for a total fo 3 months; we discussed the alternative of using  warfarin due to cost, however considering the requirement for consistent diet and therapeutic monitoring, the patient chose to remain on DOAC     Penetrating aortic ulcer  Scheduled for follow-up with vascular surgery     HTN  118/68  Continue norvasc, metoprolol, losartan     Tobacco abuse  Quit smoking 30 days ago    Admitted 10/11/19  Hospital Course:   Patient presented for outpatient coronary angiogram which went without complication. Coronary angiogram revealed mid LAD 80% stenosis proximal to a large diagonal branch. She underwent successful PCI of her mid LAD with a 3.5 x 15 mm REMY. See full cath report in Epic for details. Hemostasis of patient's R Radial access site was achieved with VascBand. Patient was monitored per post-cath protocol, and her R radial access site was c/d/i with no hematoma. Patient was able to ambulate without difficulty. She was feeling well and anticipating discharge home today.      Outpatient Plan:  - Cardiac rehab referral  - Continue medical management  - Risk factor reduction  - Plavix for at least 1 year and ASA 81 mg indefinitely  - Follow-up with outpatient cardiologist (Dr. Mederos) as scheduled     [facility] 10/11/19  Successful PCI of 80% mid LAD with 3.5X15 REMY      Echo 11/20/20  The left ventricle is mildly enlarged with mildly decreased systolic function. The estimated ejection fraction is 45%.  There is left ventricular eccentric hypertrophy.  Grade I diastolic dysfunction.  Normal right ventricular size with normal right ventricular systolic function.  Moderate mitral regurgitation.  Mild tricuspid regurgitation.  There is mild pulmonary hypertension.  Images reviewed - still suspicious for LV apical thrombus        Echo 1/20/20  Concentric left ventricular hypertrophy.  Moderately decreased left ventricular systolic function. The estimated ejection fraction is 35%.  Grade I (mild) left ventricular diastolic dysfunction consistent with impaired relaxation.  Local segmental  wall motion abnormalities.  Normal right ventricular systolic function.  Mild left atrial enlargement.  Moderate aortic regurgitation.  Mild mitral regurgitation.  No pulmonary hypertension present.  Normal central venous pressure (3 mmHg).  The estimated PA systolic pressure is 14 mmHg.           Echo  12/17/19    Technically difficult study.      Contrast was administered and successfully enabled adequate endocardial definition.     Mildly increased left ventricular cavity size.     Severely decreased left ventricular systolic function.     Left ventricular ejection fraction is estimated at 30%.     No apical thrombus noted.     Mildly increased left atrial size.     Mild mitral annular calcification.     Mild-moderate mitral valve regurgitation.     Mild aortic valve regurgitation.     Trace tricuspid valve regurgitation.      Echo 9/4/19  Severely decreased left ventricular systolic function. The estimated ejection fraction is 25%  Local segmental wall motion abnormalities with an aneurysmal apex and layered thrombus int he apex  Eccentric left ventricular hypertrophy.  Moderate left atrial enlargement.  Grade I (mild) left ventricular diastolic dysfunction consistent with impaired relaxation and elevated LVEDP  Normal right ventricular systolic function.  Mild aortic regurgitation.  Mild mitral regurgitation.  Mild tricuspid regurgitation.  The estimated PA systolic pressure is 33 mm Hg  Normal central venous pressure (3 mm Hg).     PET viability 10/8/19    There is a matched defect present in the mid to distal anteroseptal and the apical walls comprising of ~30% of myocardium in the typical distribution of the LAD. This is non-viable.     10/15/19 Wearing Lifevest  Denies CP or SOB  Back to work  Has not yet started cardiac rehab  Surprisingly asymptomatic after recent large anterior STEMI  Cardiac rehab at   OV 3 months with repeat echo - will recommend AICD if EF < 35%     12/18/19 Admitted to  with  CHF this month  GANT still present but not as severe        1/21/20 Doing well with cardiac rehab  Denies CP or SOB  EF now at 35% - discussed AICD - she wants to hold off  Stop Lifevest  Thrombus not seen on echo - will continue with xarelto for now  OV 3 months with BNP BMP     6/23/20 Recently having more LE edema - takes lasix 20 qd  Denies CP, mild GANT  EKG NSR PRWP  Increase lasix 40 qd, Add KCL 10 meq qd  OV 1 month with BNP, BMP  Repeat echo next year     8/18/20 Still with some LE edema - decreased her lasix to 20 qd over concerns about renal function. GANT improved some. Denies CP  Ok to increase lasix to 40 qd until LE edema resolved  OV 3 months with echo to look at EF and LV thrombus, BNP BMP      12/8/20 Denies CP. Mild GANT  Occasional LE edema  Will continue xarelto since I cannot completely rule out LV apical thrombus on echo.  EF improved some. Volume status appears stable  OV 6 months with BNP, BMP     7/23/21 Denies CP. Mild stable GANT  EKG NSR PRWP  Continue Rx for CAD, CHF, LV thrombus, HTN, HLD  OV 6 months with repeat echo - consider stopping OAC if thrombus resolved      4/1/22 Denies CP or SOB  BP controlled  EKG NSR PRWP NSSTT changes   Continue Rx for CAD, CHF, LV thrombus, HTN, HLD  OV 3 months with repeat echo - consider stopping OAC if thrombus resolved      8/16/22 Echo not done. Had an episode of dizziness this AM - thinks her glucose may have been low. BP has been stable. Denies other dizziness spells  Denies CP or SOB  EKG NSR old anterior MI   Echo - if LV thrombus resolved with discuss stopping xarelto   Continue Rx for CAD, CHF, LV thrombus, HTN, HLD    11/9/23 Echo not done. Denies CP, mild stable GANT  EKG NSR NSSTT changes  BP controlled by home readings    Review of Systems   Constitutional: Negative for decreased appetite.   HENT:  Negative for ear discharge.    Eyes:  Negative for blurred vision.   Respiratory:  Negative for hemoptysis.    Endocrine: Negative for polyphagia.    Hematologic/Lymphatic: Negative for adenopathy.   Skin:  Negative for color change.   Musculoskeletal:  Negative for joint swelling.   Genitourinary:  Negative for bladder incontinence.   Neurological:  Negative for brief paralysis.   Psychiatric/Behavioral:  Negative for hallucinations.    Allergic/Immunologic: Negative for hives.       Objective:   Physical Exam  Constitutional:       Appearance: She is well-developed.   HENT:      Head: Normocephalic and atraumatic.   Eyes:      Conjunctiva/sclera: Conjunctivae normal.      Pupils: Pupils are equal, round, and reactive to light.   Cardiovascular:      Rate and Rhythm: Normal rate.      Pulses: Intact distal pulses.      Heart sounds: Normal heart sounds.   Pulmonary:      Effort: Pulmonary effort is normal.      Breath sounds: Normal breath sounds.   Abdominal:      General: Bowel sounds are normal.      Palpations: Abdomen is soft.   Musculoskeletal:         General: Normal range of motion.      Cervical back: Normal range of motion and neck supple.   Skin:     General: Skin is warm and dry.   Neurological:      Mental Status: She is alert and oriented to person, place, and time.         Assessment:      1. Coronary artery disease involving native coronary artery of native heart without angina pectoris    2. Essential hypertension    3. Ischemic cardiomyopathy    4. Left ventricular thrombosis        Plan:      Echo - if LV thrombus resolved with discuss stopping xarelto   Continue Rx for CAD, CHF, LV thrombus, HTN, HLD

## 2024-02-16 RX ORDER — AMLODIPINE BESYLATE 10 MG/1
TABLET ORAL
Qty: 90 TABLET | Refills: 3 | Status: SHIPPED | OUTPATIENT
Start: 2024-02-16

## 2024-03-18 RX ORDER — METOPROLOL SUCCINATE 50 MG/1
TABLET, EXTENDED RELEASE ORAL
Qty: 90 TABLET | Refills: 3 | Status: SHIPPED | OUTPATIENT
Start: 2024-03-18

## 2024-03-18 RX ORDER — ATORVASTATIN CALCIUM 80 MG/1
TABLET, FILM COATED ORAL
Qty: 90 TABLET | Refills: 3 | Status: SHIPPED | OUTPATIENT
Start: 2024-03-18

## 2024-03-18 RX ORDER — LOSARTAN POTASSIUM 25 MG/1
TABLET ORAL
Qty: 90 TABLET | Refills: 3 | Status: SHIPPED | OUTPATIENT
Start: 2024-03-18

## 2024-04-07 RX ORDER — FUROSEMIDE 20 MG/1
TABLET ORAL
Qty: 180 TABLET | Refills: 3 | Status: SHIPPED | OUTPATIENT
Start: 2024-04-07

## 2025-02-06 ENCOUNTER — TELEPHONE (OUTPATIENT)
Dept: CARDIOLOGY | Facility: CLINIC | Age: 80
End: 2025-02-06
Payer: COMMERCIAL

## 2025-02-06 NOTE — TELEPHONE ENCOUNTER
----- Message from Med Assistant Corona sent at 2/6/2025  2:51 PM CST -----  Regarding: FW: Appointment Reschedule    ----- Message -----  From: Maggi Rivera MA  Sent: 2/6/2025   2:41 PM CST  To: [facility] Staff  Subject: Appointment Reschedule                           Good afternoon,      The patient above reached out stating she did not receive any information regarding a reschedule for March 3rd. Or the reason she was rescheduled from February 3.      Thanks,        Pt contact [phone]

## 2025-02-07 ENCOUNTER — TELEPHONE (OUTPATIENT)
Dept: CARDIOLOGY | Facility: CLINIC | Age: 80
End: 2025-02-07
Payer: COMMERCIAL

## 2025-02-07 NOTE — TELEPHONE ENCOUNTER
Spoke with patient and let her know that she does not need a letter to be able to fly. Patient insisted that airline is requesting a letter to be able to fly. Asked patient why and what specifically are they referring to in regards to her heart condition, and how the airline is aware of any heart conditions. Patient referred that they do not know but that she read that they need a letter from the doctor if she has any underlying heart conditions that would prevent her from flying. Explained that Dr. Mederos has confirmed that she is ok to fly and that if she has any questions, to first check with her airline to confirm if there is any specific diagnostic or condition that would prevent her specifically from flying and to reach back to use to confirm if that falls in line with Dr. Mederos's recommendations. Patient voiced understanding, did not request further assistance.

## 2025-02-07 NOTE — TELEPHONE ENCOUNTER
Patient wants to know if she is able to fly on air craft. She needs a letter stating that its okay.

## 2025-03-12 RX ORDER — LOSARTAN POTASSIUM 25 MG/1
25 TABLET ORAL
Qty: 90 TABLET | Refills: 3 | Status: SHIPPED | OUTPATIENT
Start: 2025-03-12

## 2025-03-12 RX ORDER — METOPROLOL SUCCINATE 50 MG/1
50 TABLET, EXTENDED RELEASE ORAL
Qty: 90 TABLET | Refills: 3 | Status: SHIPPED | OUTPATIENT
Start: 2025-03-12

## 2025-03-12 RX ORDER — ATORVASTATIN CALCIUM 80 MG/1
80 TABLET, FILM COATED ORAL
Qty: 90 TABLET | Refills: 3 | Status: SHIPPED | OUTPATIENT
Start: 2025-03-12

## 2025-04-09 ENCOUNTER — OFFICE VISIT (OUTPATIENT)
Dept: CARDIOLOGY | Facility: CLINIC | Age: 80
End: 2025-04-09
Payer: COMMERCIAL

## 2025-04-09 VITALS
WEIGHT: 196.44 LBS | DIASTOLIC BLOOD PRESSURE: 94 MMHG | SYSTOLIC BLOOD PRESSURE: 150 MMHG | BODY MASS INDEX: 30.83 KG/M2 | HEART RATE: 111 BPM | HEIGHT: 67 IN | OXYGEN SATURATION: 94 %

## 2025-04-09 DIAGNOSIS — I71.9 PENETRATING ATHEROSCLEROTIC ULCER OF AORTA: Primary | ICD-10-CM

## 2025-04-09 DIAGNOSIS — I50.43 ACUTE ON CHRONIC COMBINED SYSTOLIC AND DIASTOLIC CHF (CONGESTIVE HEART FAILURE): ICD-10-CM

## 2025-04-09 DIAGNOSIS — I10 ESSENTIAL HYPERTENSION: ICD-10-CM

## 2025-04-09 DIAGNOSIS — I25.10 CORONARY ARTERY DISEASE INVOLVING NATIVE CORONARY ARTERY OF NATIVE HEART WITHOUT ANGINA PECTORIS: ICD-10-CM

## 2025-04-09 DIAGNOSIS — I25.5 ISCHEMIC CARDIOMYOPATHY: ICD-10-CM

## 2025-04-09 PROCEDURE — 99214 OFFICE O/P EST MOD 30 MIN: CPT | Mod: S$GLB,,, | Performed by: INTERNAL MEDICINE

## 2025-04-09 PROCEDURE — 93000 ELECTROCARDIOGRAM COMPLETE: CPT | Mod: S$GLB,,, | Performed by: INTERNAL MEDICINE

## 2025-04-09 PROCEDURE — 99999 PR PBB SHADOW E&M-EST. PATIENT-LVL IV: CPT | Mod: PBBFAC,,, | Performed by: INTERNAL MEDICINE

## 2025-04-09 PROCEDURE — 3080F DIAST BP >= 90 MM HG: CPT | Mod: CPTII,S$GLB,, | Performed by: INTERNAL MEDICINE

## 2025-04-09 PROCEDURE — 3288F FALL RISK ASSESSMENT DOCD: CPT | Mod: CPTII,S$GLB,, | Performed by: INTERNAL MEDICINE

## 2025-04-09 PROCEDURE — 1124F ACP DISCUSS-NO DSCNMKR DOCD: CPT | Mod: CPTII,S$GLB,, | Performed by: INTERNAL MEDICINE

## 2025-04-09 PROCEDURE — 1101F PT FALLS ASSESS-DOCD LE1/YR: CPT | Mod: CPTII,S$GLB,, | Performed by: INTERNAL MEDICINE

## 2025-04-09 PROCEDURE — 1159F MED LIST DOCD IN RCRD: CPT | Mod: CPTII,S$GLB,, | Performed by: INTERNAL MEDICINE

## 2025-04-09 PROCEDURE — 1126F AMNT PAIN NOTED NONE PRSNT: CPT | Mod: CPTII,S$GLB,, | Performed by: INTERNAL MEDICINE

## 2025-04-09 PROCEDURE — 3077F SYST BP >= 140 MM HG: CPT | Mod: CPTII,S$GLB,, | Performed by: INTERNAL MEDICINE

## 2025-04-09 NOTE — PROGRESS NOTES
"Subjective   Patient ID:  Ella Baron is a 79 y.o. female who presents for follow-up of No chief complaint on file.      HPI         STEMI 8/31/19 - medical Rx due to penetrating aortic ulcer, REMY to LAD 10/11/19, Ischemic CM EF 25% - improved to 45%. LV thrombus on xarelto, HTN, HLD, former tobacco abuse     [place] [facility] [place]  74 [place] female with PMH CAD, Ischemic cardiomyopathy (EF 25%, wearing life vest) with LV apical thrombus (on Xarelto), congenital absence of left ulnar artery, penetrating descending aortic ulcer, HTN, former tobacco use here for hospital follow-up of STEMI. The patient was in her usual state of health until labor day long weekend, when she was awakened at 4:00 AM on 8/31/19 with a "cramping" sensation across her chest and back, with paresthesia of both arms. She sat in her recliner, took 2 advil and this sensation subsided within 20-30 minutes. The next day she had the same episode after eating scrambled eggs, also resolved within 20-30 minutes of taking 2 advil. The third and final episode happened the following day, again after food intake, lasting 20-30 minutes. The following day she had no symptoms but saw her PCP who found STEMI on EKG, she was transferred to the ER, found to have a penetrating aortic ulcer, [place] [facility] [place] was not intervened upon and she was outside of the time frame for angiography; CAD was managed medically. She was found to have a reduced LVEF of 25% with LAD territory WMA and LAD territory fixed defect on PET. She is scheduled for PET viability, follow-up with vascular surgery and Dr. Mederos who will become her primary cardiologist.     Other than the three episodes of chest/back "cramping" mentioned above, the patient has been completely asymptomatic. She denies GANT (NYHA I) and continues to perform all ADLs as before, denies orthopnea or PND but has noticed mild edema which she attributes to norvasc. She denies symptoms of " angina, syncope or life vest shocks and has had no claudication symptoms or skin ulceration. She is a former smoker, quit 30 days ago. No family history of CAD or sudden cardiac death. She is on triple therapy with ASA, plavix and Xarelto and has not had any bleeding issues.     The patient is free of angina s/p medically treated STEMI with no revascularization (asymptomatic and out of STEMI window upon arrival to [facility] [place]  Continue DAPT with ASA, plavix, high intensity statin, BB (metoprolol) and ARB (losartan)  PET with 40% LV myocardium fixed defect in LAD territory; viability study scheduled  Plan for LHC +/- PCI following viability study - no left radial access due to congenital absence of left ulnar artery  Will schedule cardiac rehab following LHC +/- PCI     Cardiac catheterization with probable PCI.   Antiplatelets: ASA, plavix  Access: Right radial  Catheters: Bayron. No LVEDP (thrombus)  Pt is a REMY candidate and understands the importance of taking plavix for at least one year, understands that in case of receiving a drug coated stent the failure to comply with dual anti-platelet therapy is likely to result in stent clothing, heart attack and death.   The risks, benefits, and alternatives of coronary vascular angiography and possible intervention were discussed with the patient. All questions were answered and informed consent was obtained. I had a detailed discussion with the patient regarding risk of stroke, MI, bleeding access site complications including limb loss, allergy, kidney failure including dialysis and death.  The patient understands the risks and benefits and wishes to go ahead with the procedure.  All patient's questions were answered     Ischemic cardiomyopathy with LV thrombus  NYHA I, LVEF 25%  Wearing life vest  Continue losartan, metoprolol  Educated on importance of fluid restriction and low sodium diet  Continue Xarelto for a total fo 3 months; we discussed the alternative of using  warfarin due to cost, however considering the requirement for consistent diet and therapeutic monitoring, the patient chose to remain on DOAC     Penetrating aortic ulcer  Scheduled for follow-up with vascular surgery     HTN  118/68  Continue norvasc, metoprolol, losartan     Tobacco abuse  Quit smoking 30 days ago    Admitted 10/11/19  Hospital Course:   Patient presented for outpatient coronary angiogram which went without complication. Coronary angiogram revealed mid LAD 80% stenosis proximal to a large diagonal branch. She underwent successful PCI of her mid LAD with a 3.5 x 15 mm REMY. See full cath report in Epic for details. Hemostasis of patient's R Radial access site was achieved with VascBand. Patient was monitored per post-cath protocol, and her R radial access site was c/d/i with no hematoma. Patient was able to ambulate without difficulty. She was feeling well and anticipating discharge home today.      Outpatient Plan:  - Cardiac rehab referral  - Continue medical management  - Risk factor reduction  - Plavix for at least 1 year and ASA 81 mg indefinitely  - Follow-up with outpatient cardiologist (Dr. Mederos) as scheduled     [facility] 10/11/19  Successful PCI of 80% mid LAD with 3.5X15 REMY      Echo 11/20/20  The left ventricle is mildly enlarged with mildly decreased systolic function. The estimated ejection fraction is 45%.  There is left ventricular eccentric hypertrophy.  Grade I diastolic dysfunction.  Normal right ventricular size with normal right ventricular systolic function.  Moderate mitral regurgitation.  Mild tricuspid regurgitation.  There is mild pulmonary hypertension.  Images reviewed - still suspicious for LV apical thrombus        Echo 1/20/20  Concentric left ventricular hypertrophy.  Moderately decreased left ventricular systolic function. The estimated ejection fraction is 35%.  Grade I (mild) left ventricular diastolic dysfunction consistent with impaired relaxation.  Local segmental  wall motion abnormalities.  Normal right ventricular systolic function.  Mild left atrial enlargement.  Moderate aortic regurgitation.  Mild mitral regurgitation.  No pulmonary hypertension present.  Normal central venous pressure (3 mmHg).  The estimated PA systolic pressure is 14 mmHg.           Echo  12/17/19    Technically difficult study.      Contrast was administered and successfully enabled adequate endocardial definition.     Mildly increased left ventricular cavity size.     Severely decreased left ventricular systolic function.     Left ventricular ejection fraction is estimated at 30%.     No apical thrombus noted.     Mildly increased left atrial size.     Mild mitral annular calcification.     Mild-moderate mitral valve regurgitation.     Mild aortic valve regurgitation.     Trace tricuspid valve regurgitation.      Echo 9/4/19  Severely decreased left ventricular systolic function. The estimated ejection fraction is 25%  Local segmental wall motion abnormalities with an aneurysmal apex and layered thrombus int he apex  Eccentric left ventricular hypertrophy.  Moderate left atrial enlargement.  Grade I (mild) left ventricular diastolic dysfunction consistent with impaired relaxation and elevated LVEDP  Normal right ventricular systolic function.  Mild aortic regurgitation.  Mild mitral regurgitation.  Mild tricuspid regurgitation.  The estimated PA systolic pressure is 33 mm Hg  Normal central venous pressure (3 mm Hg).     PET viability 10/8/19    There is a matched defect present in the mid to distal anteroseptal and the apical walls comprising of ~30% of myocardium in the typical distribution of the LAD. This is non-viable.     10/15/19 Wearing Lifevest  Denies CP or SOB  Back to work  Has not yet started cardiac rehab  Surprisingly asymptomatic after recent large anterior STEMI  Cardiac rehab at   OV 3 months with repeat echo - will recommend AICD if EF < 35%     12/18/19 Admitted to  with  CHF this month  GANT still present but not as severe        1/21/20 Doing well with cardiac rehab  Denies CP or SOB  EF now at 35% - discussed AICD - she wants to hold off  Stop Lifevest  Thrombus not seen on echo - will continue with xarelto for now  OV 3 months with BNP, BMP     6/23/20 Recently having more LE edema - takes lasix 20 qd  Denies CP, mild GANT  EKG NSR PRWP  Increase lasix 40 qd, Add KCL 10 meq qd  OV 1 month with BNP, BMP  Repeat echo next year     8/18/20 Still with some LE edema - decreased her lasix to 20 qd over concerns about renal function. GANT improved some. Denies CP  Ok to increase lasix to 40 qd until LE edema resolved  OV 3 months with echo to look at EF and LV thrombus, BNP, BMP      12/8/20 Denies CP. Mild GANT  Occasional LE edema  Will continue xarelto since I cannot completely rule out LV apical thrombus on echo.  EF improved some. Volume status appears stable  OV 6 months with BNP, BMP     7/23/21 Denies CP. Mild stable GANT  EKG NSR PRWP  Continue Rx for CAD, CHF, LV thrombus, HTN, HLD  OV 6 months with repeat echo - consider stopping OAC if thrombus resolved      4/1/22 Denies CP or SOB  BP controlled  EKG NSR PRWP NSSTT changes   Continue Rx for CAD, CHF, LV thrombus, HTN, HLD  OV 3 months with repeat echo - consider stopping OAC if thrombus resolved      8/16/22 Echo not done. Had an episode of dizziness this AM - thinks her glucose may have been low. BP has been stable. Denies other dizziness spells  Denies CP or SOB  EKG NSR old anterior MI   Echo - if LV thrombus resolved with discuss stopping xarelto   Continue Rx for CAD, CHF, LV thrombus, HTN, HLD     11/9/23 Echo not done. Denies CP, mild stable GANT  EKG NSR NSSTT changes  BP controlled by home readings   Echo - if LV thrombus resolved with discuss stopping xarelto   Continue Rx for CAD, CHF, LV thrombus, HTN, HLD     4/9/25 Echo not done. Denies CP, recently having GANT. States she is no longer on xarelto - unclear when she  stopped it  EKG sinus tachycardia 103 NSSTT changes, PRWP       Review of Systems   Constitutional: Negative for decreased appetite.   HENT:  Negative for ear discharge.    Eyes:  Negative for blurred vision.   Respiratory:  Negative for hemoptysis.    Endocrine: Negative for polyphagia.   Hematologic/Lymphatic: Negative for adenopathy.   Skin:  Negative for color change.   Musculoskeletal:  Negative for joint swelling.   Genitourinary:  Negative for bladder incontinence.   Neurological:  Negative for brief paralysis.   Psychiatric/Behavioral:  Negative for hallucinations.    Allergic/Immunologic: Negative for hives.          Objective     Physical Exam  Constitutional:       Appearance: She is well-developed.   HENT:      Head: Normocephalic and atraumatic.   Eyes:      Conjunctiva/sclera: Conjunctivae normal.      Pupils: Pupils are equal, round, and reactive to light.   Cardiovascular:      Rate and Rhythm: Normal rate.      Pulses: Intact distal pulses.      Heart sounds: Normal heart sounds.   Pulmonary:      Effort: Pulmonary effort is normal.      Breath sounds: Normal breath sounds.   Abdominal:      General: Bowel sounds are normal.      Palpations: Abdomen is soft.   Musculoskeletal:         General: Normal range of motion.      Cervical back: Normal range of motion and neck supple.   Skin:     General: Skin is warm and dry.   Neurological:      Mental Status: She is alert and oriented to person, place, and time.            Assessment and Plan     1. Penetrating atherosclerotic ulcer of aorta    2. Essential hypertension    3. Coronary artery disease involving native coronary artery of native heart without angina pectoris    4. Ischemic cardiomyopathy    5. Acute on chronic combined systolic and diastolic CHF (congestive heart failure)        Plan:     Concerns for CHF with increased GANT and resting tachycardia  Echo, BNP, BMP  Will review to see if she needs to stay on xarelto given Hx LV thrombus  Needs  to go to the ER for worsening symptoms  Needs to bring all meds to f/u OV  Continue Rx for CAD, CHF, LV thrombus, HTN, HLD    Advance Care Planning     Date: 04/09/2025  Patient did not wish or was not able to name a surrogate decision maker or provide an Advance Care Plan.

## 2025-04-10 LAB
OHS QRS DURATION: 116 MS
OHS QTC CALCULATION: 400 MS

## 2025-04-17 ENCOUNTER — HOSPITAL ENCOUNTER (OUTPATIENT)
Dept: CARDIOLOGY | Facility: HOSPITAL | Age: 80
Discharge: HOME | End: 2025-04-17
Attending: INTERNAL MEDICINE
Payer: COMMERCIAL

## 2025-04-17 VITALS — WEIGHT: 196 LBS | HEIGHT: 67 IN | BODY MASS INDEX: 30.76 KG/M2

## 2025-04-17 DIAGNOSIS — I71.9 PENETRATING ATHEROSCLEROTIC ULCER OF AORTA: ICD-10-CM

## 2025-04-17 DIAGNOSIS — I10 ESSENTIAL HYPERTENSION: ICD-10-CM

## 2025-04-17 DIAGNOSIS — I25.5 ISCHEMIC CARDIOMYOPATHY: ICD-10-CM

## 2025-04-17 DIAGNOSIS — I50.43 ACUTE ON CHRONIC COMBINED SYSTOLIC AND DIASTOLIC CHF (CONGESTIVE HEART FAILURE): ICD-10-CM

## 2025-04-17 DIAGNOSIS — I25.10 CORONARY ARTERY DISEASE INVOLVING NATIVE CORONARY ARTERY OF NATIVE HEART WITHOUT ANGINA PECTORIS: ICD-10-CM

## 2025-04-17 LAB
ASCENDING AORTA: 2.96 CM
AV INDEX (PROSTH): 0.79
AV MEAN GRADIENT: 3 MMHG
AV PEAK GRADIENT: 6 MMHG
AV REGURGITATION PRESSURE HALF TIME: 565 MS
AV VALVE AREA BY VELOCITY RATIO: 2.9 CM²
AV VALVE AREA: 2.7 CM²
AV VELOCITY RATIO: 0.83
BSA FOR ECHO PROCEDURE: 2.05 M2
CV ECHO LV RWT: 0.44 CM
DOP CALC AO PEAK VEL: 1.2 M/S
DOP CALC AO VTI: 22.4 CM
DOP CALC LVOT AREA: 3.5 CM2
DOP CALC LVOT DIAMETER: 2.1 CM
DOP CALC LVOT PEAK VEL: 1 M/S
DOP CALC LVOT STROKE VOLUME: 61.3 CM3
DOP CALCLVOT PEAK VEL VTI: 17.7 CM
E WAVE DECELERATION TIME: 211 MSEC
E/A RATIO: 0.52
E/E' RATIO: 18 M/S
ECHO LV POSTERIOR WALL: 1.2 CM (ref 0.6–1.1)
FRACTIONAL SHORTENING: 20 % (ref 28–44)
INTERVENTRICULAR SEPTUM: 1.1 CM (ref 0.6–1.1)
IVC DIAMETER: 1.67 CM
IVRT: 126 MSEC
LA MAJOR: 5.2 CM
LA MINOR: 5.4 CM
LA WIDTH: 5.1 CM
LEFT ATRIUM SIZE: 3.2 CM
LEFT ATRIUM VOLUME INDEX: 37 ML/M2
LEFT ATRIUM VOLUME: 73 CM3
LEFT INTERNAL DIMENSION IN SYSTOLE: 4.4 CM (ref 2.1–4)
LEFT VENTRICLE DIASTOLIC VOLUME INDEX: 73 ML/M2
LEFT VENTRICLE DIASTOLIC VOLUME: 146 ML
LEFT VENTRICLE MASS INDEX: 128.5 G/M2
LEFT VENTRICLE SYSTOLIC VOLUME INDEX: 44.5 ML/M2
LEFT VENTRICLE SYSTOLIC VOLUME: 89 ML
LEFT VENTRICULAR INTERNAL DIMENSION IN DIASTOLE: 5.5 CM (ref 3.5–6)
LEFT VENTRICULAR MASS: 257 G
LV LATERAL E/E' RATIO: 17.7 M/S
LV SEPTAL E/E' RATIO: 17.7 M/S
LVED V (TEICH): 145.85 ML
LVES V (TEICH): 88.62 ML
LVOT MG: 2.22 MMHG
LVOT MV: 0.7 CM/S
MV PEAK A VEL: 1.01 M/S
MV PEAK E VEL: 0.53 M/S
MV STENOSIS PRESSURE HALF TIME: 61.19 MS
MV VALVE AREA P 1/2 METHOD: 3.6 CM2
OHS CV RV/LV RATIO: 0.69 CM
PISA AR MAX VEL: 3.55 M/S
PISA TR MAX VEL: 1.8 M/S
PULM VEIN S/D RATIO: 1.59
PV PEAK D VEL: 0.22 M/S
PV PEAK GRADIENT: 6 MMHG
PV PEAK S VEL: 0.35 M/S
PV PEAK VELOCITY: 1.18 M/S
RA MAJOR: 4.42 CM
RA PRESSURE ESTIMATED: 3 MMHG
RA WIDTH: 3.9 CM
RIGHT VENTRICLE DIASTOLIC BASEL DIMENSION: 3.8 CM
RIGHT VENTRICULAR END-DIASTOLIC DIMENSION: 3.8 CM
RV TB RVSP: 5 MMHG
RV TISSUE DOPPLER FREE WALL SYSTOLIC VELOCITY 1 (APICAL 4 CHAMBER VIEW): 12.69 CM/S
SINUS: 3.14 CM
STJ: 2.57 CM
TDI LATERAL: 0.03 M/S
TDI SEPTAL: 0.03 M/S
TDI: 0.03 M/S
TR MAX PG: 13 MMHG
TRICUSPID ANNULAR PLANE SYSTOLIC EXCURSION: 1.69 CM
TV REST PULMONARY ARTERY PRESSURE: 16 MMHG
Z-SCORE OF LEFT VENTRICULAR DIMENSION IN END DIASTOLE: -0.58
Z-SCORE OF LEFT VENTRICULAR DIMENSION IN END SYSTOLE: 1.63

## 2025-04-17 PROCEDURE — 93306 TTE W/DOPPLER COMPLETE: CPT | Mod: 26,,, | Performed by: INTERNAL MEDICINE

## 2025-04-17 PROCEDURE — 93306 TTE W/DOPPLER COMPLETE: CPT

## 2025-04-29 ENCOUNTER — OFFICE VISIT (OUTPATIENT)
Dept: CARDIOLOGY | Facility: CLINIC | Age: 80
End: 2025-04-29
Payer: COMMERCIAL

## 2025-04-29 VITALS
HEIGHT: 67 IN | SYSTOLIC BLOOD PRESSURE: 154 MMHG | OXYGEN SATURATION: 97 % | WEIGHT: 196 LBS | DIASTOLIC BLOOD PRESSURE: 84 MMHG | HEART RATE: 107 BPM | BODY MASS INDEX: 30.76 KG/M2

## 2025-04-29 DIAGNOSIS — I50.43 ACUTE ON CHRONIC COMBINED SYSTOLIC AND DIASTOLIC CHF (CONGESTIVE HEART FAILURE): Primary | ICD-10-CM

## 2025-04-29 DIAGNOSIS — I25.10 CORONARY ARTERY DISEASE INVOLVING NATIVE CORONARY ARTERY OF NATIVE HEART WITHOUT ANGINA PECTORIS: ICD-10-CM

## 2025-04-29 DIAGNOSIS — I25.5 ISCHEMIC CARDIOMYOPATHY: ICD-10-CM

## 2025-04-29 DIAGNOSIS — I10 ESSENTIAL HYPERTENSION: ICD-10-CM

## 2025-04-29 DIAGNOSIS — I71.9 PENETRATING ATHEROSCLEROTIC ULCER OF AORTA: ICD-10-CM

## 2025-04-29 PROCEDURE — 1126F AMNT PAIN NOTED NONE PRSNT: CPT | Mod: CPTII,S$GLB,, | Performed by: INTERNAL MEDICINE

## 2025-04-29 PROCEDURE — 3079F DIAST BP 80-89 MM HG: CPT | Mod: CPTII,S$GLB,, | Performed by: INTERNAL MEDICINE

## 2025-04-29 PROCEDURE — 1124F ACP DISCUSS-NO DSCNMKR DOCD: CPT | Mod: CPTII,S$GLB,, | Performed by: INTERNAL MEDICINE

## 2025-04-29 PROCEDURE — 99999 PR PBB SHADOW E&M-EST. PATIENT-LVL IV: CPT | Mod: PBBFAC,,, | Performed by: INTERNAL MEDICINE

## 2025-04-29 PROCEDURE — 99214 OFFICE O/P EST MOD 30 MIN: CPT | Mod: S$GLB,,, | Performed by: INTERNAL MEDICINE

## 2025-04-29 PROCEDURE — 1159F MED LIST DOCD IN RCRD: CPT | Mod: CPTII,S$GLB,, | Performed by: INTERNAL MEDICINE

## 2025-04-29 PROCEDURE — 3288F FALL RISK ASSESSMENT DOCD: CPT | Mod: CPTII,S$GLB,, | Performed by: INTERNAL MEDICINE

## 2025-04-29 PROCEDURE — 1101F PT FALLS ASSESS-DOCD LE1/YR: CPT | Mod: CPTII,S$GLB,, | Performed by: INTERNAL MEDICINE

## 2025-04-29 PROCEDURE — 3077F SYST BP >= 140 MM HG: CPT | Mod: CPTII,S$GLB,, | Performed by: INTERNAL MEDICINE

## 2025-04-29 RX ORDER — SACUBITRIL AND VALSARTAN 24; 26 MG/1; MG/1
1 TABLET, FILM COATED ORAL 2 TIMES DAILY
Qty: 180 TABLET | Refills: 3 | Status: SHIPPED | OUTPATIENT
Start: 2025-04-29

## 2025-04-29 NOTE — PROGRESS NOTES
"Subjective   Patient ID:  Ella Baron is a 79 y.o. female who presents for follow-up of Results      HPI      STEMI 8/31/19 - medical Rx due to penetrating aortic ulcer, REMY to LAD 10/11/19, Ischemic CM EF 25% - improved to 45%. LV thrombus on xarelto, HTN, HLD, former tobacco abuse     Followed at [facility] [place]  74 year old female with PMH CAD, Ischemic cardiomyopathy (EF 25%, wearing life vest) with LV apical thrombus (on Xarelto), congenital absence of left ulnar artery, penetrating descending aortic ulcer, HTN, former tobacco use here for hospital follow-up of STEMI. The patient was in her usual state of health until labor day long weekend, when she was awakened at 4:00 AM on 8/31/19 with a "cramping" sensation across her chest and back, with paresthesia of both arms. She sat in her recliner, took 2 advil and this sensation subsided within 20-30 minutes. The next day she had the same episode after eating scrambled eggs, also resolved within 20-30 minutes of taking 2 advil. The third and final episode happened the following day, again after food intake, lasting 20-30 minutes. The following day she had no symptoms but saw her PCP who found STEMI on EKG, she was transferred to the ER, found to have a penetrating aortic ulcer, was transferred to [facility] and penetrating aortic ulcer was not intervened upon and she was outside of the time frame for angiography; CAD was managed medically. She was found to have a reduced LVEF of 25% with LAD territory WMA and LAD territory fixed defect on PET. She is scheduled for PET viability, follow-up with vascular surgery and Dr. Mederos who will become her primary cardiologist.     Other than the three episodes of chest/back "cramping" mentioned above, the patient has been completely asymptomatic. She denies GANT (NYHA I) and continues to perform all ADLs as before, denies orthopnea or PND but has noticed mild edema which she attributes to norvasc. She denies symptoms of angina, syncope or life " vest shocks and has had no claudication symptoms or skin ulceration. She is a former smoker, quit 30 days ago. No family history of CAD or sudden cardiac death. She is on triple therapy with ASA, plavix and Xarelto and has not had any bleeding issues.     The patient is free of angina s/p medically treated STEMI with no revascularization (asymptomatic and out of STEMI window upon arrival to [facility]).  Continue DAPT with ASA, plavix, high intensity statin, BB (metoprolol) and ARB (losartan)  PET with 40% LV myocardium fixed defect in LAD territory; viability study scheduled  Plan for LHC +/- PCI following viability study - no left radial access due to congenital absence of left ulnar artery  Will schedule cardiac rehab following LHC +/- PCI     Cardiac catheterization with probable PCI.   Antiplatelets: ASA, plavix  Access: Right radial  Catheters: Bayron. No LVEDP (thrombus)  Pt is a REMY candidate and understands the importance of taking plavix for at least one year, understands that in case of receiving a drug coated stent the failure to comply with dual anti-platelet therapy is likely to result in stent clothing, heart attack and death.   The risks, benefits, and alternatives of coronary vascular angiography and possible intervention were discussed with the patient. All questions were answered and informed consent was obtained. I had a detailed discussion with the patient regarding risk of stroke, MI, bleeding access site complications including limb loss, allergy, kidney failure including dialysis and death.  The patient understands the risks and benefits and wishes to go ahead with the procedure.  All patient's questions were answered     Ischemic cardiomyopathy with LV thrombus  NYHA I, LVEF 25%  Wearing life vest  Continue losartan, metoprolol  Educated on importance of fluid restriction and low sodium diet  Continue Xarelto for a total fo 3 months; we discussed the alternative of using warfarin due to cost,  however considering the requirement for consistent diet and therapeutic monitoring, the patient chose to remain on DOAC     Penetrating aortic ulcer  Scheduled for follow-up with vascular surgery     HTN  118/68  Continue norvasc, metoprolol, losartan     Tobacco abuse  Quit smoking 30 days ago    Admitted 10/11/19  Hospital Course:   Patient presented for outpatient coronary angiogram which went without complication. Coronary angiogram revealed mid LAD 80% stenosis proximal to a large diagonal branch. She underwent successful PCI of her mid LAD with a 3.5 x 15 mm REMY. See full cath report in Epic for details. Hemostasis of patient's R Radial access site was achieved with VascBand. Patient was monitored per post-cath protocol, and her R radial access site was c/d/i with no hematoma. Patient was able to ambulate without difficulty. She was feeling well and anticipating discharge home today.      Outpatient Plan:  - Cardiac rehab referral  - Continue medical management  - Risk factor reduction  - Plavix for at least 1 year and ASA 81 mg indefinitely  - Follow-up with outpatient cardiologist (Dr. Mederos) as scheduled     [facility] 10/11/19  Successful PCI of 80% mid LAD with 3.5X15 REMY     Echo 4/17/25    Left Ventricle: The left ventricle is mildly dilated. There is mild eccentric hypertrophy. There is moderately reduced systolic function with a visually estimated ejection fraction of 35 - 40%. Anteroseptal and apical akinesis    Right Ventricle: The right ventricle is normal in size. Systolic function is normal.    Aortic Valve: There is mild aortic regurgitation.    Pulmonary Artery: The estimated pulmonary artery systolic pressure is 16 mmHg.    IVC/SVC: Normal venous pressure at 3 mmHg.    No left ventricular thrombus seen        Echo 11/20/20  The left ventricle is mildly enlarged with mildly decreased systolic function. The estimated ejection fraction is 45%.  There is left ventricular eccentric hypertrophy.  Grade I  diastolic dysfunction.  Normal right ventricular size with normal right ventricular systolic function.  Moderate mitral regurgitation.  Mild tricuspid regurgitation.  There is mild pulmonary hypertension.  Images reviewed - still suspicious for LV apical thrombus        Echo 1/20/20  Concentric left ventricular hypertrophy.  Moderately decreased left ventricular systolic function. The estimated ejection fraction is 35%.  Grade I (mild) left ventricular diastolic dysfunction consistent with impaired relaxation.  Local segmental wall motion abnormalities.  Normal right ventricular systolic function.  Mild left atrial enlargement.  Moderate aortic regurgitation.  Mild mitral regurgitation.  No pulmonary hypertension present.  Normal central venous pressure (3 mmHg).  The estimated PA systolic pressure is 14 mmHg.           Echo [facility] 12/17/19    Technically difficult study.      Contrast was administered and successfully enabled adequate endocardial definition.     Mildly increased left ventricular cavity size.     Severely decreased left ventricular systolic function.     Left ventricular ejection fraction is estimated at 30%.     No apical thrombus noted.     Mildly increased left atrial size.     Mild mitral annular calcification.     Mild-moderate mitral valve regurgitation.     Mild aortic valve regurgitation.     Trace tricuspid valve regurgitation.      Echo 9/4/19  Severely decreased left ventricular systolic function. The estimated ejection fraction is 25%  Local segmental wall motion abnormalities with an aneurysmal apex and layered thrombus int he apex  Eccentric left ventricular hypertrophy.  Moderate left atrial enlargement.  Grade I (mild) left ventricular diastolic dysfunction consistent with impaired relaxation and elevated LVEDP  Normal right ventricular systolic function.  Mild aortic regurgitation.  Mild mitral regurgitation.  Mild tricuspid regurgitation.  The estimated PA systolic pressure is 33 mm  Hg  Normal central venous pressure (3 mm Hg).     PET viability 10/8/19    There is a matched defect present in the mid to distal anteroseptal and the apical walls comprising of ~30% of myocardium in the typical distribution of the LAD. This is non-viable.     10/15/19 Wearing Lifevest  Denies CP or SOB  Back to work  Has not yet started cardiac rehab  Surprisingly asymptomatic after recent large anterior STEMI  Cardiac rehab at   OV 3 months with repeat echo - will recommend AICD if EF < 35%     12/18/19 Admitted to  with CHF this month  GANT still present but not as severe        1/21/20 Doing well with cardiac rehab  Denies CP or SOB  EF now at 35% - discussed AICD - she wants to hold off  Stop Lifevest  Thrombus not seen on echo - will continue with xarelto for now  OV 3 months with BNP, BMP     6/23/20 Recently having more LE edema - takes lasix 20 qd  Denies CP, mild GANT  EKG NSR PRWP  Increase lasix 40 qd, Add KCL 10 meq qd  OV 1 month with BNP, BMP  Repeat echo next year     8/18/20 Still with some LE edema - decreased her lasix to 20 qd over concerns about renal function. GANT improved some. Denies CP  Ok to increase lasix to 40 qd until LE edema resolved  OV 3 months with echo to look at EF and LV thrombus, BNP, BMP      12/8/20 Denies CP. Mild GANT  Occasional LE edema  Will continue xarelto since I cannot completely rule out LV apical thrombus on echo.  EF improved some. Volume status appears stable  OV 6 months with BNP, BMP     7/23/21 Denies CP. Mild stable GANT  EKG NSR PRWP  Continue Rx for CAD, CHF, LV thrombus, HTN, HLD  OV 6 months with repeat echo - consider stopping OAC if thrombus resolved      4/1/22 Denies CP or SOB  BP controlled  EKG NSR PRWP NSSTT changes   Continue Rx for CAD, CHF, LV thrombus, HTN, HLD  OV 3 months with repeat echo - consider stopping OAC if thrombus resolved      8/16/22 Echo not done. Had an episode of dizziness this AM - thinks her glucose may have been low. BP has  been stable. Denies other dizziness spells  Denies CP or SOB  EKG NSR old anterior MI   Echo - if LV thrombus resolved with discuss stopping xarelto   Continue Rx for CAD, CHF, LV thrombus, HTN, HLD     11/9/23 Echo not done. Denies CP, mild stable GANT  EKG NSR NSSTT changes  BP controlled by home readings   Echo - if LV thrombus resolved with discuss stopping xarelto   Continue Rx for CAD, CHF, LV thrombus, HTN, HLD     4/9/25 Echo not done. Denies CP, recently having GANT. States she is no longer on xarelto - unclear when she stopped it  EKG sinus tachycardia 103 NSSTT changes, PRWP  Concerns for CHF with increased GANT and resting tachycardia  Echo, BNP, BMP  Will review to see if she needs to stay on xarelto given Hx LV thrombus  Needs to go to the ER for worsening symptoms  Needs to bring all meds to f/u OV  Continue Rx for CAD, CHF, LV thrombus, HTN, HLD     Labs 4/17/25  K 3.4  Cr 1.0       4/29/25 Denies CP, less SOB  BP mostly controlled    Review of Systems   Constitutional: Negative for decreased appetite.   HENT:  Negative for ear discharge.    Eyes:  Negative for blurred vision.   Respiratory:  Negative for hemoptysis.    Endocrine: Negative for polyphagia.   Hematologic/Lymphatic: Negative for adenopathy.   Skin:  Negative for color change.   Musculoskeletal:  Negative for joint swelling.   Genitourinary:  Negative for bladder incontinence.   Neurological:  Negative for brief paralysis.   Psychiatric/Behavioral:  Negative for hallucinations.    Allergic/Immunologic: Negative for hives.          Objective     Physical Exam  Constitutional:       Appearance: She is well-developed.   HENT:      Head: Normocephalic and atraumatic.   Eyes:      Conjunctiva/sclera: Conjunctivae normal.      Pupils: Pupils are equal, round, and reactive to light.   Cardiovascular:      Rate and Rhythm: Normal rate.      Pulses: Intact distal pulses.      Heart sounds: Normal heart sounds.   Pulmonary:      Effort:  Pulmonary effort is normal.      Breath sounds: Normal breath sounds.   Abdominal:      General: Bowel sounds are normal.      Palpations: Abdomen is soft.   Musculoskeletal:         General: Normal range of motion.      Cervical back: Normal range of motion and neck supple.   Skin:     General: Skin is warm and dry.   Neurological:      Mental Status: She is alert and oriented to person, place, and time.            Assessment and Plan     1. Acute on chronic combined systolic and diastolic CHF (congestive heart failure)    2. Penetrating atherosclerotic ulcer of aorta    3. Essential hypertension    4. Ischemic cardiomyopathy    5. Coronary artery disease involving native coronary artery of native heart without angina pectoris        Plan:    EF stable 35-40% on echo - no LV thrombus seen - ok to stay off of xarelto  Change losartan to entresto 24-26 bid  OV 2 months with BNP, BMP   Continue Rx for CAD, CHF, LV thrombus, HTN, HLD    Advance Care Planning     Date: 04/29/2025  Patient did not wish or was not able to name a surrogate decision maker or provide an Advance Care Plan.

## 2025-05-12 RX ORDER — AMLODIPINE BESYLATE 10 MG/1
5 TABLET ORAL
Qty: 45 TABLET | Refills: 3 | Status: SHIPPED | OUTPATIENT
Start: 2025-05-12

## 2025-06-18 ENCOUNTER — TELEPHONE (OUTPATIENT)
Dept: CARDIOLOGY | Facility: CLINIC | Age: 80
End: 2025-06-18
Payer: COMMERCIAL

## 2025-06-20 ENCOUNTER — LAB VISIT (OUTPATIENT)
Dept: LAB | Facility: HOSPITAL | Age: 80
End: 2025-06-20
Attending: INTERNAL MEDICINE
Payer: COMMERCIAL

## 2025-06-20 DIAGNOSIS — I10 ESSENTIAL HYPERTENSION: ICD-10-CM

## 2025-06-20 DIAGNOSIS — I71.9 PENETRATING ATHEROSCLEROTIC ULCER OF AORTA: ICD-10-CM

## 2025-06-20 DIAGNOSIS — I25.5 ISCHEMIC CARDIOMYOPATHY: ICD-10-CM

## 2025-06-20 DIAGNOSIS — I50.43 ACUTE ON CHRONIC COMBINED SYSTOLIC AND DIASTOLIC CHF (CONGESTIVE HEART FAILURE): ICD-10-CM

## 2025-06-20 DIAGNOSIS — I25.10 CORONARY ARTERY DISEASE INVOLVING NATIVE CORONARY ARTERY OF NATIVE HEART WITHOUT ANGINA PECTORIS: ICD-10-CM

## 2025-06-20 LAB
ANION GAP (OHS): 8 MMOL/L (ref 8–16)
BNP SERPL-MCNC: 285 PG/ML (ref 0–99)
BUN SERPL-MCNC: 11 MG/DL (ref 8–23)
CALCIUM SERPL-MCNC: 9.4 MG/DL (ref 8.7–10.5)
CHLORIDE SERPL-SCNC: 106 MMOL/L (ref 95–110)
CO2 SERPL-SCNC: 25 MMOL/L (ref 23–29)
CREAT SERPL-MCNC: 1 MG/DL (ref 0.5–1.4)
GFR SERPLBLD CREATININE-BSD FMLA CKD-EPI: 57 ML/MIN/1.73/M2
GLUCOSE SERPL-MCNC: 97 MG/DL (ref 70–110)
POTASSIUM SERPL-SCNC: 4.1 MMOL/L (ref 3.5–5.1)
SODIUM SERPL-SCNC: 139 MMOL/L (ref 136–145)

## 2025-06-20 PROCEDURE — 82374 ASSAY BLOOD CARBON DIOXIDE: CPT

## 2025-06-20 PROCEDURE — 36415 COLL VENOUS BLD VENIPUNCTURE: CPT | Mod: PO

## 2025-06-20 PROCEDURE — 83880 ASSAY OF NATRIURETIC PEPTIDE: CPT

## 2025-06-26 ENCOUNTER — OFFICE VISIT (OUTPATIENT)
Dept: CARDIOLOGY | Facility: CLINIC | Age: 80
End: 2025-06-26
Payer: COMMERCIAL

## 2025-06-26 VITALS
SYSTOLIC BLOOD PRESSURE: 150 MMHG | WEIGHT: 196 LBS | HEIGHT: 67 IN | DIASTOLIC BLOOD PRESSURE: 84 MMHG | BODY MASS INDEX: 30.76 KG/M2 | HEART RATE: 74 BPM

## 2025-06-26 DIAGNOSIS — I25.5 ISCHEMIC CARDIOMYOPATHY: ICD-10-CM

## 2025-06-26 DIAGNOSIS — I50.43 ACUTE ON CHRONIC COMBINED SYSTOLIC AND DIASTOLIC CHF (CONGESTIVE HEART FAILURE): Primary | ICD-10-CM

## 2025-06-26 DIAGNOSIS — I10 ESSENTIAL HYPERTENSION: ICD-10-CM

## 2025-06-26 DIAGNOSIS — I25.10 CORONARY ARTERY DISEASE INVOLVING NATIVE CORONARY ARTERY OF NATIVE HEART WITHOUT ANGINA PECTORIS: ICD-10-CM

## 2025-06-26 DIAGNOSIS — I51.3 LEFT VENTRICULAR THROMBOSIS: ICD-10-CM

## 2025-06-26 DIAGNOSIS — I71.9 PENETRATING ATHEROSCLEROTIC ULCER OF AORTA: ICD-10-CM

## 2025-06-26 PROCEDURE — 1126F AMNT PAIN NOTED NONE PRSNT: CPT | Mod: CPTII,S$GLB,, | Performed by: INTERNAL MEDICINE

## 2025-06-26 PROCEDURE — 99999 PR PBB SHADOW E&M-EST. PATIENT-LVL III: CPT | Mod: PBBFAC,,, | Performed by: INTERNAL MEDICINE

## 2025-06-26 PROCEDURE — 1159F MED LIST DOCD IN RCRD: CPT | Mod: CPTII,S$GLB,, | Performed by: INTERNAL MEDICINE

## 2025-06-26 PROCEDURE — 3288F FALL RISK ASSESSMENT DOCD: CPT | Mod: CPTII,S$GLB,, | Performed by: INTERNAL MEDICINE

## 2025-06-26 PROCEDURE — 1124F ACP DISCUSS-NO DSCNMKR DOCD: CPT | Mod: CPTII,S$GLB,, | Performed by: INTERNAL MEDICINE

## 2025-06-26 PROCEDURE — 3079F DIAST BP 80-89 MM HG: CPT | Mod: CPTII,S$GLB,, | Performed by: INTERNAL MEDICINE

## 2025-06-26 PROCEDURE — 3077F SYST BP >= 140 MM HG: CPT | Mod: CPTII,S$GLB,, | Performed by: INTERNAL MEDICINE

## 2025-06-26 PROCEDURE — 99214 OFFICE O/P EST MOD 30 MIN: CPT | Mod: S$GLB,,, | Performed by: INTERNAL MEDICINE

## 2025-06-26 PROCEDURE — 1101F PT FALLS ASSESS-DOCD LE1/YR: CPT | Mod: CPTII,S$GLB,, | Performed by: INTERNAL MEDICINE

## 2025-06-26 RX ORDER — SACUBITRIL AND VALSARTAN 49; 51 MG/1; MG/1
1 TABLET, FILM COATED ORAL 2 TIMES DAILY
Qty: 180 TABLET | Refills: 3 | Status: SHIPPED | OUTPATIENT
Start: 2025-06-26

## 2025-06-26 NOTE — PROGRESS NOTES
"Subjective   Patient ID:  Ella Baron is a 79 y.o. female who presents for follow-up of Congestive Heart Failure      HPI      STEMI 8/31/19 - medical Rx due to penetrating aortic ulcer, REMY to LAD 10/11/19, Ischemic CM EF 25% - improved to 45%. LV thrombus on xarelto, HTN, HLD, former tobacco abuse     Follow[place] at [facility] [place]  74 year old female with PMH CAD, Ischemic cardiomyopathy (EF 25%, wearing life vest) with LV apical thrombus (on Xarelto), congenital absence of left ulnar artery, penetrating descending aortic ulcer, HTN, former tobacco use here for hospital follow-up of STEMI. The patient was in her usual state of health until labor day long weekend, when she was awakened at 4:00 AM on 8/31/19 with a "cramping" sensation across her chest and back, with paresthesia of both arms. She sat in her recliner, took 2 advil and this sensation subsided within 20-30 minutes. The next day she had the same episode after eating scrambled eggs, also resolved within 20-30 minutes of taking 2 advil. The third and final episode happened the following day, again after food intake, lasting 20-30 minutes. The following day she had no symptoms but saw her PCP who found STEMI on EKG, she was transferred to the ER, found to have a penetrating aortic ulcer, was transferred to [facility] [place] and penetrating aortic ulcer was not intervened upon and she was outside of the time frame for angiography; CAD was managed medically. She was found to have a reduced LVEF of 25% with LAD territory WMA and LAD territory fixed defect on PET. She is scheduled for PET viability, follow-up with vascular surgery and Dr. Mederos who will become her primary cardiologist.     Other than the three episodes of chest/back "cramping" mentioned above, the patient has been completely asymptomatic. She denies GANT (NYHA I) and continues to perform all ADLs as before, denies orthopnea or PND but has noticed mild edema which she attributes to norvasc. She denies symptoms of angina, " syncope or life vest shocks and has had no claudication symptoms or skin ulceration. She is a former smoker, quit 30 days ago. No family history of CAD or sudden cardiac death. She is on triple therapy with ASA, plavix and Xarelto and has not had any bleeding issues.     The patient is free of angina s/p medically treated STEMI with no revascularization (asymptomatic and out of STEMI window upon arrival to [facility] [place]).  Continue DAPT with ASA, plavix, high intensity statin, BB (metoprolol) and ARB (losartan)  PET with 40% LV myocardium fixed defect in LAD territory; viability study scheduled  Plan for LHC +/- PCI following viability study - no left radial access due to congenital absence of left ulnar artery  Will schedule cardiac rehab following LHC +/- PCI     Cardiac catheterization with probable PCI.   Antiplatelets: ASA, plavix  Access: Right radial  Catheters: Bayron. No LVEDP (thrombus)  Pt is a REMY candidate and understands the importance of taking plavix for at least one year, understands that in case of receiving a drug coated stent the failure to comply with dual anti-platelet therapy is likely to result in stent clothing, heart attack and death.   The risks, benefits, and alternatives of coronary vascular angiography and possible intervention were discussed with the patient. All questions were answered and informed consent was obtained. I had a detailed discussion with the patient regarding risk of stroke, MI, bleeding access site complications including limb loss, allergy, kidney failure including dialysis and death.  The patient understands the risks and benefits and wishes to go ahead with the procedure.  All patient's questions were answered     Ischemic cardiomyopathy with LV thrombus  NYHA I, LVEF 25%  Wearing life vest  Continue losartan, metoprolol  Educated on importance of fluid restriction and low sodium diet  Continue Xarelto for a total fo 3 months; we discussed the alternative of using warfarin due  to cost, however considering the requirement for consistent diet and therapeutic monitoring, the patient chose to remain on DOAC     Penetrating aortic ulcer  Scheduled for follow-up with vascular surgery     HTN  118/68  Continue norvasc, metoprolol, losartan     Tobacco abuse  Quit smoking 30 days ago    Admitted 10/11/19  Hospital Course:   Patient presented for outpatient coronary angiogram which went without complication. Coronary angiogram revealed mid LAD 80% stenosis proximal to a large diagonal branch. She underwent successful PCI of her mid LAD with a 3.5 x 15 mm REMY. See full cath report in Epic for details. Hemostasis of patient's R Radial access site was achieved with VascBand. Patient was monitored per post-cath protocol, and her R radial access site was c/d/i with no hematoma. Patient was able to ambulate without difficulty. She was feeling well and anticipating discharge home today.      Outpatient Plan:  - Cardiac rehab referral  - Continue medical management  - Risk factor reduction  - Plavix for at least 1 year and ASA 81 mg indefinitely  - Follow-up with outpatient cardiologist (Dr. Mederos) as scheduled     [facility] 10/11/19  Successful PCI of 80% mid LAD with 3.5X15 REMY      Echo 4/17/25    Left Ventricle: The left ventricle is mildly dilated. There is mild eccentric hypertrophy. There is moderately reduced systolic function with a visually estimated ejection fraction of 35 - 40%. Anteroseptal and apical akinesis    Right Ventricle: The right ventricle is normal in size. Systolic function is normal.    Aortic Valve: There is mild aortic regurgitation.    Pulmonary Artery: The estimated pulmonary artery systolic pressure is 16 mmHg.    IVC/SVC: Normal venous pressure at 3 mmHg.    No left ventricular thrombus seen        Echo 11/20/20  The left ventricle is mildly enlarged with mildly decreased systolic function. The estimated ejection fraction is 45%.  There is left ventricular eccentric  hypertrophy.  Grade I diastolic dysfunction.  Normal right ventricular size with normal right ventricular systolic function.  Moderate mitral regurgitation.  Mild tricuspid regurgitation.  There is mild pulmonary hypertension.  Images reviewed - still suspicious for LV apical thrombus        Echo 1/20/20  Concentric left ventricular hypertrophy.  Moderately decreased left ventricular systolic function. The estimated ejection fraction is 35%.  Grade I (mild) left ventricular diastolic dysfunction consistent with impaired relaxation.  Local segmental wall motion abnormalities.  Normal right ventricular systolic function.  Mild left atrial enlargement.  Moderate aortic regurgitation.  Mild mitral regurgitation.  No pulmonary hypertension present.  Normal central venous pressure (3 mmHg).  The estimated PA systolic pressure is 14 mmHg.           Echo [facility] 12/17/19    Technically difficult study.      Contrast was administered and successfully enabled adequate endocardial definition.     Mildly increased left ventricular cavity size.     Severely decreased left ventricular systolic function.     Left ventricular ejection fraction is estimated at 30%.     No apical thrombus noted.     Mildly increased left atrial size.     Mild mitral annular calcification.     Mild-moderate mitral valve regurgitation.     Mild aortic valve regurgitation.     Trace tricuspid valve regurgitation.      Echo 9/4/19  Severely decreased left ventricular systolic function. The estimated ejection fraction is 25%  Local segmental wall motion abnormalities with an aneurysmal apex and layered thrombus int he apex  Eccentric left ventricular hypertrophy.  Moderate left atrial enlargement.  Grade I (mild) left ventricular diastolic dysfunction consistent with impaired relaxation and elevated LVEDP  Normal right ventricular systolic function.  Mild aortic regurgitation.  Mild mitral regurgitation.  Mild tricuspid regurgitation.  The estimated PA  systolic pressure is 33 mm Hg  Normal central venous pressure (3 mm Hg).     PET viability 10/8/19    There is a matched defect present in the mid to distal anteroseptal and the apical walls comprising of ~30% of myocardium in the typical distribution of the LAD. This is non-viable.     10/15/19 Wearing Lifevest  Denies CP or SOB  Back to work  Has not yet started cardiac rehab  Surprisingly asymptomatic after recent large anterior STEMI  Cardiac rehab at   OV 3 months with repeat echo - will recommend AICD if EF < 35%     12/18/19 Admitted to  with CHF this month  GANT still present but not as severe        1/21/20 Doing well with cardiac rehab  Denies CP or SOB  EF now at 35% - discussed AICD - she wants to hold off  Stop Lifevest  Thrombus not seen on echo - will continue with xarelto for now  OV 3 months with BNP, BMP     6/23/20 Recently having more LE edema - takes lasix 20 qd  Denies CP, mild GANT  EKG NSR PRWP  Increase lasix 40 qd, Add KCL 10 meq qd  OV 1 month with BNP, BMP  Repeat echo next year     8/18/20 Still with some LE edema - decreased her lasix to 20 qd over concerns about renal function. GANT improved some. Denies CP  Ok to increase lasix to 40 qd until LE edema resolved  OV 3 months with echo to look at EF and LV thrombus, BNP, BMP      12/8/20 Denies CP. Mild GANT  Occasional LE edema  Will continue xarelto since I cannot completely rule out LV apical thrombus on echo.  EF improved some. Volume status appears stable  OV 6 months with BNP, BMP     7/23/21 Denies CP. Mild stable GANT  EKG NSR PRWP  Continue Rx for CAD, CHF, LV thrombus, HTN, HLD  OV 6 months with repeat echo - consider stopping OAC if thrombus resolved      4/1/22 Denies CP or SOB  BP controlled  EKG NSR PRWP NSSTT changes   Continue Rx for CAD, CHF, LV thrombus, HTN, HLD  OV 3 months with repeat echo - consider stopping OAC if thrombus resolved      8/16/22 Echo not done. Had an episode of dizziness this AM - thinks her glucose  may have been low. BP has been stable. Denies other dizziness spells  Denies CP or SOB  EKG NSR old anterior MI   Echo - if LV thrombus resolved with discuss stopping xarelto   Continue Rx for CAD, CHF, LV thrombus, HTN, HLD     11/9/23 Echo not done. Denies CP, mild stable GANT  EKG NSR NSSTT changes  BP controlled by home readings   Echo - if LV thrombus resolved with discuss stopping xarelto   Continue Rx for CAD, CHF, LV thrombus, HTN, HLD     4/9/25 Echo not done. Denies CP, recently having GANT. States she is no longer on xarelto - unclear when she stopped it  EKG sinus tachycardia 103 NSSTT changes, PRWP  Concerns for CHF with increased GANT and resting tachycardia  Echo, BNP, BMP  Will review to see if she needs to stay on xarelto given Hx LV thrombus  Needs to go to the ER for worsening symptoms  Needs to bring all meds to f/u OV  Continue Rx for CAD, CHF, LV thrombus, HTN, HLD     Labs 4/17/25  K 3.4  Cr 1.0       4/29/25 Denies CP, less SOB  BP mostly controlled  EF stable 35-40% on echo - no LV thrombus seen - ok to stay off of xarelto  Change losartan to entresto 24-26 bid  OV 2 months with BNP, BMP  Continue Rx for CAD, CHF, LV thrombus, HTN, HLD    Labs 6/20/25  K 4.1  Cr 1.0      6/26/25 GANT improved with entresto, denies CP  BP controlled by outside readings       Review of Systems   Constitutional: Negative for decreased appetite.   HENT:  Negative for ear discharge.    Eyes:  Negative for blurred vision.   Respiratory:  Negative for hemoptysis.    Endocrine: Negative for polyphagia.   Hematologic/Lymphatic: Negative for adenopathy.   Skin:  Negative for color change.   Musculoskeletal:  Negative for joint swelling.   Genitourinary:  Negative for bladder incontinence.   Neurological:  Negative for brief paralysis.   Psychiatric/Behavioral:  Negative for hallucinations.    Allergic/Immunologic: Negative for hives.          Objective     Physical Exam  Constitutional:       Appearance: She  is well-developed.   HENT:      Head: Normocephalic and atraumatic.   Eyes:      Conjunctiva/sclera: Conjunctivae normal.      Pupils: Pupils are equal, round, and reactive to light.   Cardiovascular:      Rate and Rhythm: Normal rate.      Pulses: Intact distal pulses.      Heart sounds: Normal heart sounds.   Pulmonary:      Effort: Pulmonary effort is normal.      Breath sounds: Normal breath sounds.   Abdominal:      General: Bowel sounds are normal.      Palpations: Abdomen is soft.   Musculoskeletal:         General: Normal range of motion.      Cervical back: Normal range of motion and neck supple.   Skin:     General: Skin is warm and dry.   Neurological:      Mental Status: She is alert and oriented to person, place, and time.            Assessment and Plan     1. Acute on chronic combined systolic and diastolic CHF (congestive heart failure)    2. Essential hypertension    3. Penetrating atherosclerotic ulcer of aorta    4. Left ventricular thrombosis    5. Ischemic cardiomyopathy    6. Coronary artery disease involving native coronary artery of native heart without angina pectoris        Plan:    BNP stable - symptoms of GANT improved   Change entresto to 49-51 bid  OV 3 months with BNP, BMP  Continue Rx for CAD, CHF, HTN, HLD    Advance Care Planning     Date: 06/26/2025  Patient did not wish or was not able to name a surrogate decision maker or provide an Advance Care Plan.

## 2025-07-01 DIAGNOSIS — I71.9 PENETRATING ATHEROSCLEROTIC ULCER OF AORTA: ICD-10-CM

## 2025-07-01 DIAGNOSIS — I10 ESSENTIAL HYPERTENSION: ICD-10-CM

## 2025-07-01 DIAGNOSIS — I25.5 ISCHEMIC CARDIOMYOPATHY: ICD-10-CM

## 2025-07-01 DIAGNOSIS — I25.10 CORONARY ARTERY DISEASE INVOLVING NATIVE CORONARY ARTERY OF NATIVE HEART WITHOUT ANGINA PECTORIS: ICD-10-CM

## 2025-07-01 DIAGNOSIS — I51.3 LEFT VENTRICULAR THROMBOSIS: ICD-10-CM

## 2025-07-01 DIAGNOSIS — I50.43 ACUTE ON CHRONIC COMBINED SYSTOLIC AND DIASTOLIC CHF (CONGESTIVE HEART FAILURE): ICD-10-CM

## 2025-07-02 DIAGNOSIS — I25.5 ISCHEMIC CARDIOMYOPATHY: ICD-10-CM

## 2025-07-02 DIAGNOSIS — I50.43 ACUTE ON CHRONIC COMBINED SYSTOLIC AND DIASTOLIC CHF (CONGESTIVE HEART FAILURE): ICD-10-CM

## 2025-07-02 DIAGNOSIS — I51.3 LEFT VENTRICULAR THROMBOSIS: ICD-10-CM

## 2025-07-02 DIAGNOSIS — I25.10 CORONARY ARTERY DISEASE INVOLVING NATIVE CORONARY ARTERY OF NATIVE HEART WITHOUT ANGINA PECTORIS: ICD-10-CM

## 2025-07-02 DIAGNOSIS — I10 ESSENTIAL HYPERTENSION: ICD-10-CM

## 2025-07-02 DIAGNOSIS — I71.9 PENETRATING ATHEROSCLEROTIC ULCER OF AORTA: ICD-10-CM

## 2025-07-02 RX ORDER — SACUBITRIL AND VALSARTAN 49; 51 MG/1; MG/1
1 TABLET, FILM COATED ORAL 2 TIMES DAILY
Qty: 180 TABLET | Refills: 3 | Status: SHIPPED | OUTPATIENT
Start: 2025-07-02

## 2025-07-02 RX ORDER — SACUBITRIL AND VALSARTAN 49; 51 MG/1; MG/1
1 TABLET, FILM COATED ORAL 2 TIMES DAILY
Qty: 180 TABLET | Refills: 3 | Status: SHIPPED | OUTPATIENT
Start: 2025-07-02 | End: 2025-07-02 | Stop reason: SDUPTHER

## (undated) DEVICE — SEE MEDLINE ITEM 157187

## (undated) DEVICE — KIT PROBE COVER WITH GEL

## (undated) DEVICE — GUIDE LAUNCHER 6FR EBU 3.5

## (undated) DEVICE — VISE RADIFOCUS MULTI TORQUE

## (undated) DEVICE — GUIDEWIRE STF .035X180CM ANG

## (undated) DEVICE — WIRE GUIDE SAFE-T-J .035 260CM

## (undated) DEVICE — SEE MEDLINE ITEM 156894

## (undated) DEVICE — HEMOSTAT VASC BAND REG 24CM

## (undated) DEVICE — OMNIPAQUE 350 200ML

## (undated) DEVICE — CATH JACKY RADIAL 5FR 100CM

## (undated) DEVICE — GUIDEWIRE EMERALD 150CM PTFE

## (undated) DEVICE — SPIKE CONTRAST CONTROLLER

## (undated) DEVICE — KIT CO-PILOT

## (undated) DEVICE — CATH BLLN FG APEX MR 3.00X12MM

## (undated) DEVICE — GUIDE WIRE BMW 014 X190

## (undated) DEVICE — INFLATOR ENCORE 26 BLLN INFL

## (undated) DEVICE — KIT CUSTOM MANIFOLD

## (undated) DEVICE — KIT GLIDESHEATH SLEND 6FR 10CM